# Patient Record
Sex: FEMALE | Race: WHITE | NOT HISPANIC OR LATINO | Employment: OTHER | URBAN - METROPOLITAN AREA
[De-identification: names, ages, dates, MRNs, and addresses within clinical notes are randomized per-mention and may not be internally consistent; named-entity substitution may affect disease eponyms.]

---

## 2017-02-07 ENCOUNTER — TRANSFERRED RECORDS (OUTPATIENT)
Dept: FAMILY MEDICINE | Facility: CLINIC | Age: 82
End: 2017-02-07

## 2017-03-22 DIAGNOSIS — Z76.0 ENCOUNTER FOR MEDICATION REFILL: ICD-10-CM

## 2017-03-22 RX ORDER — LOVASTATIN 20 MG
TABLET ORAL
Qty: 90 TABLET | Refills: 2 | Status: SHIPPED | OUTPATIENT
Start: 2017-03-22 | End: 2017-12-13

## 2017-03-22 NOTE — TELEPHONE ENCOUNTER
Pending Prescriptions:                       Disp   Refills    lovastatin (MEVACOR) 20 MG tablet [Pharmac*90 tab*2        Sig: TAKE ONE (1) TABLET BY MOUTH AT BEDTIME      Last OV 2/1/16  CMP 2/1/16

## 2017-04-10 ENCOUNTER — TELEPHONE (OUTPATIENT)
Dept: FAMILY MEDICINE | Facility: CLINIC | Age: 82
End: 2017-04-10

## 2017-04-10 DIAGNOSIS — Z76.0 ENCOUNTER FOR MEDICATION REFILL: ICD-10-CM

## 2017-04-10 RX ORDER — LISINOPRIL 20 MG/1
TABLET ORAL
Qty: 90 TABLET | Refills: 0 | Status: SHIPPED | OUTPATIENT
Start: 2017-04-10 | End: 2017-07-05

## 2017-04-10 RX ORDER — LEVOTHYROXINE SODIUM 25 UG/1
TABLET ORAL
Qty: 90 TABLET | Refills: 0 | Status: SHIPPED | OUTPATIENT
Start: 2017-04-10 | End: 2017-07-05

## 2017-04-10 NOTE — TELEPHONE ENCOUNTER
I have left a message for Jayna to call us back.  Lyla is due for an appointment.  She was last seen 2/2016.    Donte Richey,   [facility]  [phone]

## 2017-05-19 DIAGNOSIS — Z76.0 ENCOUNTER FOR MEDICATION REFILL: ICD-10-CM

## 2017-05-19 RX ORDER — HYDROCHLOROTHIAZIDE 25 MG/1
TABLET ORAL
Qty: 90 TABLET | Refills: 2 | Status: SHIPPED | OUTPATIENT
Start: 2017-05-19 | End: 2018-03-13

## 2017-07-05 DIAGNOSIS — Z76.0 ENCOUNTER FOR MEDICATION REFILL: ICD-10-CM

## 2017-07-05 NOTE — TELEPHONE ENCOUNTER
multiple rx--- original rx was denied (patient needs appt).  there was an appointment already scheduled for 9/12/17 for a cpx.

## 2017-07-07 RX ORDER — LEVOTHYROXINE SODIUM 25 UG/1
TABLET ORAL
Qty: 90 TABLET | Refills: 0 | Status: SHIPPED | OUTPATIENT
Start: 2017-07-07 | End: 2017-10-09

## 2017-07-07 RX ORDER — LISINOPRIL 20 MG/1
TABLET ORAL
Qty: 90 TABLET | Refills: 0 | Status: SHIPPED | OUTPATIENT
Start: 2017-07-07 | End: 2017-10-09

## 2017-09-12 ENCOUNTER — OFFICE VISIT (OUTPATIENT)
Dept: FAMILY MEDICINE | Facility: CLINIC | Age: 82
End: 2017-09-12

## 2017-09-12 VITALS
HEIGHT: 61 IN | HEART RATE: 67 BPM | BODY MASS INDEX: 25.98 KG/M2 | SYSTOLIC BLOOD PRESSURE: 122 MMHG | OXYGEN SATURATION: 98 % | WEIGHT: 137.6 LBS | TEMPERATURE: 98.5 F | DIASTOLIC BLOOD PRESSURE: 72 MMHG

## 2017-09-12 DIAGNOSIS — Z00.00 MEDICARE ANNUAL WELLNESS VISIT, SUBSEQUENT: Primary | ICD-10-CM

## 2017-09-12 DIAGNOSIS — R41.3 POOR SHORT-TERM MEMORY: ICD-10-CM

## 2017-09-12 DIAGNOSIS — R79.89 ABNORMAL TSH: ICD-10-CM

## 2017-09-12 DIAGNOSIS — E03.8 OTHER SPECIFIED HYPOTHYROIDISM: ICD-10-CM

## 2017-09-12 DIAGNOSIS — I10 ESSENTIAL HYPERTENSION: ICD-10-CM

## 2017-09-12 PROCEDURE — 99214 OFFICE O/P EST MOD 30 MIN: CPT | Mod: 25 | Performed by: FAMILY MEDICINE

## 2017-09-12 PROCEDURE — 36415 COLL VENOUS BLD VENIPUNCTURE: CPT | Performed by: FAMILY MEDICINE

## 2017-09-12 PROCEDURE — G0439 PPPS, SUBSEQ VISIT: HCPCS | Performed by: FAMILY MEDICINE

## 2017-09-12 NOTE — PROGRESS NOTES
"  SUBJECTIVE:   Lyla Duran is a 89 year old female who presents for Preventive Visit.  She is doing well, with medication for HTN, hypothyroidism and hyperlipidemia. She lives in an assisted living environment and has meals. She has ST memory issues, but this seems to be \"stable\".  Are you in the first 12 months of your Medicare Part B coverage?  No    Healthy Habits:    Do you get at least three servings of calcium containing foods daily (dairy, green leafy vegetables, etc.)? yes    Amount of exercise or daily activities, outside of work: 5 day(s) per week    Problems taking medications regularly No    Medication side effects: No    Have you had an eye exam in the past two years? yes    Do you see a dentist twice per year? yes    Do you have sleep apnea, excessive snoring or daytime drowsiness?no    COGNITIVE SCREEN  1) Repeat 3 items (Banana, Sunrise, Chair)    2) Clock draw: NORMAL  3) 3 item recall: Recalls NO objects   Results: 0 items recalled: PROBABLE COGNITIVE IMPAIRMENT, **INFORM PROVIDER**    Mini-CogTM Copyright S Cruzito. Licensed by the author for use in [facility]; reprinted with permission (norbert@[facility]). All rights reserved.              NO CONCERNS    Reviewed and updated as needed this visit by clinical staffTobacco  Allergies  Meds         Reviewed and updated as needed this visit by Provider        Social History   Substance Use Topics     Smoking status: Never Smoker     Smokeless tobacco: Never Used     Alcohol use 0.5 oz/week     1 drink(s) per week       The patient does not drink >3 drinks per day nor >7 drinks per week.    Today's PHQ-2 Score: 0  PHQ-2 ( 1999 Pfizer) 9/12/2017 6/4/2014   Q1: Little interest or pleasure in doing things 0 0   Q2: Feeling down, depressed or hopeless 0 0   PHQ-2 Score 0 0         Do you feel safe in your environment - Yes    Do you have a Health Care Directive?: Yes: Advance Directive has been received and scanned.    Current providers sharing " in care for this patient include: Patient Care Team:  Ranjit Reyes MD as PCP - General (Family Practice)      Hearing impairment: Yes, wear hearing aids     Ability to successfully perform activities of daily living: No, needs assistance with: transportation, shopping, preparing meals, housework, bathing, laundry and medications.    Fall risk:  Fallen 2 or more times in the past year?: No  Any fall with injury in the past year?: No      Home safety:  none identified      The following health maintenance items are reviewed in Epic and correct as of today:  Health Maintenance   Topic Date Due     ADVANCE DIRECTIVE PLANNING Q5 YRS  03/03/1980     TSH Q1 YEAR  06/04/2015     INFLUENZA VACCINE (SYSTEM ASSIGNED)  09/01/2017     FALL RISK ASSESSMENT  09/12/2018     TETANUS IMMUNIZATION (SYSTEM ASSIGNED)  04/03/2019     PNEUMOCOCCAL  Completed     Patient Active Problem List   Diagnosis     Essential hypertension     Mixed hyperlipidemia     Presbyacusis     Disequilibrium     Hypothyroid     Poor short-term memory     FH: CAD (coronary artery disease)     Health Care Home     Past Surgical History:   Procedure Laterality Date     APPENDECTOMY       CATARACT IOL, RT/LT      Cataract IOL RT/LT     EYE SURGERY      retinal hemorrage      THYROIDECTOMY       Thyroidectomy     TONSILLECTOMY & ADENOIDECTOMY       VITRECTOMY PARSPLANA, SECONDARY INTRAOCULAR LENS IMPLANT, COMBINED  9/18/2013    Procedure: COMBINED VITRECTOMY PARSPLANA, SECONDARY INTRAOCULAR LENS IMPLANT;  LEFT 20 GAUGE VITRECTOMY PARSPLANA, EXCHANGE INTRAOCULAR LENS IMPLANT;  Surgeon: Brandon Umanzor MD;  [facility]       Social History   Substance Use Topics     Smoking status: Never Smoker     Smokeless tobacco: Never Used     Alcohol use 0.5 oz/week     1 drink(s) per week     Family History   Problem Relation Age of Onset     CANCER Mother      HEART DISEASE Father      C.A.D. Brother      TORITO.JO. Brother      CANCER Brother          Current  "Outpatient Prescriptions   Medication Sig Dispense Refill     lisinopril (PRINIVIL/ZESTRIL) 20 MG tablet TAKE ONE (1) TABLET BY MOUTH DAILY 90 tablet 0     levothyroxine (SYNTHROID/LEVOTHROID) 25 MCG tablet TAKE ONE (1) TABLET BY MOUTH DAILY 90 tablet 0     hydrochlorothiazide (HYDRODIURIL) 25 MG tablet TAKE ONE (1) TABLET (25 mg) BY mouth every morning 90 tablet 2     lovastatin (MEVACOR) 20 MG tablet TAKE ONE (1) TABLET BY MOUTH AT BEDTIME 90 tablet 2             ROS:  C: NEGATIVE for fever, chills, change in weight  I: NEGATIVE for worrisome rashes, moles or lesions  E: NEGATIVE for vision changes or irritation  E/M: NEGATIVE for ear, mouth and throat problems  R: NEGATIVE for significant cough or SOB  B: NEGATIVE for masses, tenderness or discharge  CV: NEGATIVE for chest pain, palpitations or peripheral edema  GI: NEGATIVE for nausea, abdominal pain, heartburn, or change in bowel habits  : NEGATIVE for frequency, dysuria, or hematuria  M: NEGATIVE for significant arthralgias or myalgia  N: short term memory decline- not new  E: NEGATIVE for temperature intolerance, skin/hair changes  H: NEGATIVE for bleeding problems  P: NEGATIVE for changes in mood or affect    OBJECTIVE:   /72  Pulse 67  Temp 98.5  F (36.9  C) (Oral)  Ht 1.537 m (5' 0.5\")  Wt 62.4 kg (137 lb 9.6 oz)  SpO2 98%  BMI 26.43 kg/m2 Estimated body mass index is 26.43 kg/(m^2) as calculated from the following:    Height as of this encounter: 1.537 m (5' 0.5\").    Weight as of this encounter: 62.4 kg (137 lb 9.6 oz).  EXAM:   GENERAL APPEARANCE: healthy, alert and no distress  EYES: Eyes grossly normal to inspection, PERRL and conjunctivae and sclerae normal  HENT: ear canals and TM's normal, nose and mouth without ulcers or lesions, oropharynx clear and oral mucous membranes moist  NECK: no adenopathy, no asymmetry, masses, or scars and thyroid normal to palpation  RESP: lungs clear to auscultation - no rales, rhonchi or " "wheezes  BREAST: normal without masses, tenderness or nipple discharge and no palpable axillary masses or adenopathy  CV: regular rate and rhythm, normal S1 S2, no S3 or S4, occasional ectopic  ABDOMEN: soft, nontender, no hepatosplenomegaly, no masses and bowel sounds normal  MS: no musculoskeletal defects are noted and gait is age appropriate without ataxia  SKIN: no suspicious lesions or rashes  NEURO: Normal strength and tone, sensory exam grossly normal, poor short term memory, but executive functioning is quite intact  PSYCH: mentation appears normal and affect normal/bright    ASSESSMENT / PLAN:       ICD-10-CM    1. Medicare annual wellness visit, subsequent Z00.00    2. Other specified hypothyroidism E03.8 TSH ([facility])     OFFICE/OUTPT VISIT,EST,LEVL IV   3. Essential hypertension I10 Basic Metabolic Panel (8) ([facility])     OFFICE/OUTPT VISIT,EST,LEVL IV   4. Poor short-term memory R41.3 Vitamin B12 ([facility])     OFFICE/OUTPT VISIT,EST,LEVL IV   she is doing well, no change in meds needed.    End of Life Planning:  Patient currently has an advanced directive: Yes.  Practitioner is supportive of decision.    COUNSELING:          Estimated body mass index is 26.43 kg/(m^2) as calculated from the following:    Height as of this encounter: 1.537 m (5' 0.5\").    Weight as of this encounter: 62.4 kg (137 lb 9.6 oz).     reports that she has never smoked. She has never used smokeless tobacco.      Appropriate preventive services were discussed with this patient, including applicable screening as appropriate for cardiovascular disease, diabetes, osteopenia/osteoporosis, and glaucoma.  As appropriate for age/gender, discussed screening for colorectal cancer, prostate cancer, breast cancer, and cervical cancer. Checklist reviewing preventive services available has been given to the patient.    Reviewed patients plan of care and provided an AVS. The Basic Care Plan (routine screening as documented in Health " Maintenance) for Lyla meets the Care Plan requirement. This Care Plan has been established and reviewed with the Patient.    Counseling Resources:  ATP IV Guidelines  Pooled Cohorts Equation Calculator  Breast Cancer Risk Calculator  FRAX Risk Assessment  ICSI Preventive Guidelines  Dietary Guidelines for Americans, 2010  USDA's MyPlate  ASA Prophylaxis  Lung CA Screening    Ranjit Reyes MD  [facility]

## 2017-09-12 NOTE — MR AVS SNAPSHOT
After Visit Summary   9/12/2017    Lyla Duran    MRN: [id number]           Patient Information     Date Of Birth          3/3/1925        Visit Information        Provider Department      9/12/2017 9:45 AM Ranjit Reyes MD [facility]        Today's Diagnoses     Medicare annual wellness visit, subsequent    -  1    Other specified hypothyroidism        Essential hypertension        Poor short-term memory          Care Instructions      Preventive Health Recommendations    Female Ages 65 +    Yearly exam:     See your health care provider every year in order to  o Review health changes.   o Discuss preventive care.    o Review your medicines if your doctor has prescribed any.      You no longer need a yearly Pap test unless you've had an abnormal Pap test in the past 10 years. If you have vaginal symptoms, such as bleeding or discharge, be sure to talk with your provider about a Pap test.      Every 1 to 2 years, have a mammogram.  If you are over 69, talk with your health care provider about whether or not you want to continue having screening mammograms.      Every 10 years, have a colonoscopy. Or, have a yearly FIT test (stool test). These exams will check for colon cancer.       Have a cholesterol test every 5 years, or more often if your doctor advises it.       Have a diabetes test (fasting glucose) every three years. If you are at risk for diabetes, you should have this test more often.       At age 65, have a bone density scan (DEXA) to check for osteoporosis (brittle bone disease).    Shots:    Get a flu shot each year.    Get a tetanus shot every 10 years.    Talk to your doctor about your pneumonia vaccines. There are now two you should receive - Pneumovax (PPSV 23) and Prevnar (PCV 13).    Talk to your doctor about the shingles vaccine.    Talk to your doctor about the hepatitis B vaccine.    Nutrition:     Eat at least 5 servings of fruits and vegetables each  day.      Eat whole-grain bread, whole-wheat pasta and brown rice instead of white grains and rice.      Talk to your provider about Calcium and Vitamin D.     Lifestyle    Exercise at least 150 minutes a week (30 minutes a day, 5 days a week). This will help you control your weight and prevent disease.      Limit alcohol to one drink per day.      No smoking.       Wear sunscreen to prevent skin cancer.       See your dentist twice a year for an exam and cleaning.      See your eye doctor every 1 to 2 years to screen for conditions such as glaucoma, macular degeneration and cataracts.          Follow-ups after your visit        Who to contact     If you have questions or need follow up information about today's clinic visit or your schedule please contact [facility] directly at [phone].  Normal or non-critical lab and imaging results will be communicated to you by HourVillehart, letter or phone within 4 business days after the clinic has received the results. If you do not hear from us within 7 days, please contact the clinic through Vetiaryt or phone. If you have a critical or abnormal lab result, we will notify you by phone as soon as possible.  Submit refill requests through EUROBOX or call your pharmacy and they will forward the refill request to us. Please allow 3 business days for your refill to be completed.          Additional Information About Your Visit        EUROBOX Information     EUROBOX gives you secure access to your electronic health record. If you see a primary care provider, you can also send messages to your care team and make appointments. If you have questions, please call your primary care clinic.  If you do not have a primary care provider, please call [phone] and they will assist you.        Care EveryWhere ID     This is your Care EveryWhere ID. This could be used by other organizations to access your [place] medical records  [id number]        Your Vitals Were     Pulse  "Temperature Height Pulse Oximetry BMI (Body Mass Index)       67 98.5  F (36.9  C) (Oral) 1.537 m (5' 0.5\") 98% 26.43 kg/m2        Blood Pressure from Last 3 Encounters:   09/12/17 122/72   02/01/16 192/85   02/01/16 134/52    Weight from Last 3 Encounters:   09/12/17 62.4 kg (137 lb 9.6 oz)   02/01/16 58.1 kg (128 lb)   10/27/15 58.2 kg (128 lb 6.4 oz)              We Performed the Following     Basic Metabolic Panel (8) ([facility])     OFFICE/OUTPT VISIT,EST,LEVL IV     TSH ([facility])     Vitamin B12 ([facility])        Primary Care Provider Office Phone # Fax #    Ranjit Reyes [phone] [address] [facility] [address]        Equal Access to Services     [facility] : Hadii taz ku hadasho Soomaali, waaxda luqadaha, qaybta kaalmada adeegyada, waxay hillaryin haykailey mustafa . So [facility] [phone].    ATENCIÓN: Si virginia major, tiene a eckert disposición servicios gratuitos de asistencia lingüística. Llame al [phone].    We comply with applicable federal civil rights laws and [place] laws. We do not discriminate on the basis of race, color, national origin, age, disability sex, sexual orientation or gender identity.            Thank you!     Thank you for choosing [facility]  for your care. Our goal is always to provide you with excellent care. Hearing back from our patients is one way we can continue to improve our services. Please take a few minutes to complete the written survey that you may receive in the mail after your visit with us. Thank you!             Your Updated Medication List - Protect others around you: Learn how to safely use, store and throw away your medicines at www.disposemymeds.org.          This list is accurate as of: 9/12/17  1:01 PM.  Always use your most recent med list.                   Brand Name Dispense Instructions for use Diagnosis    hydrochlorothiazide 25 MG tablet    HYDRODIURIL    90 tablet    TAKE ONE (1) TABLET (25 mg) " BY mouth every morning    Encounter for medication refill       levothyroxine 25 MCG tablet    SYNTHROID/LEVOTHROID    90 tablet    TAKE ONE (1) TABLET BY MOUTH DAILY    Encounter for medication refill       lisinopril 20 MG tablet    PRINIVIL/ZESTRIL    90 tablet    TAKE ONE (1) TABLET BY MOUTH DAILY    Encounter for medication refill       lovastatin 20 MG tablet    MEVACOR    90 tablet    TAKE ONE (1) TABLET BY MOUTH AT BEDTIME    Encounter for medication refill

## 2017-09-12 NOTE — LETTER
[facility] [address] [place]  [address]  Phone: [phone]    September 22, 2017      Lyla Duran  [address]AH[address]  [address] [place] [address]              Dear Lyla,    The results from your recent visit showed that your TSH is low, but free T4 is fine. Ok to continue levothyroxine 25 mcg daily.        Sincerely,     Vikki Crowder M.D.    Results for orders placed or performed in visit on 09/12/17   Basic Metabolic Panel (8) ([facility])   Result Value Ref Range    Glucose 91 65 - 99 mg/dL    Urea Nitrogen 16 10 - 36 mg/dL    Creatinine 0.73 0.57 - 1.00 mg/dL    eGFR If NonAfricn Am 72 >59 mL/min/1.73    eGFR If Africn Am 83 >59 mL/min/1.73    BUN/Creatinine Ratio 22 12 - 28    Sodium 144 134 - 144 mmol/L    Potassium 4.5 3.5 - 5.2 mmol/L    Chloride 101 96 - 106 mmol/L    Total CO2 29 (H) 18 - 28 mmol/L    Calcium 9.2 8.7 - 10.3 mg/dL    Narrative    Performed at:  01 - [facility] [address], [place], [place]  [id number]  : Randy Zapata MD, Phone:  [phone]   TSH ([facility])   Result Value Ref Range    TSH 0.202 (L) 0.450 - 4.500 uIU/mL    Narrative    Performed at:  01 - [facility] [address], [place], [place]  [id number]  : Randy Zapata MD, Phone:  [phone]   Vitamin B12 ([facility])   Result Value Ref Range    Vitamin B12 384 211 - 946 pg/mL    Narrative    Performed at:  01 - [facility] [address], [place], [place]  [id number]  : Randy Zapata MD, Phone:  [phone]   Thyroxine (T4) Free  Direct  S ([facility])   Result Value Ref Range    T4 Free 1.60 0.82 - 1.77 ng/dL    Narrative    Performed at:  01 - [facility] [address], [place], [place]  [id number]  : Randy Zapata MD, Phone:  [phone]

## 2017-09-13 LAB
BUN SERPL-MCNC: 16 MG/DL (ref 10–36)
BUN/CREATININE RATIO: 22 (ref 12–28)
CALCIUM SERPL-MCNC: 9.2 MG/DL (ref 8.7–10.3)
CHLORIDE SERPLBLD-SCNC: 101 MMOL/L (ref 96–106)
CREAT SERPL-MCNC: 0.73 MG/DL (ref 0.57–1)
EGFR IF AFRICN AM: 83 ML/MIN/1.73
EGFR IF NONAFRICN AM: 72 ML/MIN/1.73
GLUCOSE SERPL-MCNC: 91 MG/DL (ref 65–99)
POTASSIUM SERPL-SCNC: 4.5 MMOL/L (ref 3.5–5.2)
SODIUM SERPL-SCNC: 144 MMOL/L (ref 134–144)
TOTAL CO2: 29 MMOL/L (ref 18–28)
TSH BLD-ACNC: 0.2 UIU/ML (ref 0.45–4.5)
VIT B12 SERPL-MCNC: 384 PG/ML (ref 211–946)

## 2017-09-19 LAB — T4 FREE SERPL-MCNC: 1.6 NG/DL (ref 0.82–1.77)

## 2017-10-09 DIAGNOSIS — Z76.0 ENCOUNTER FOR MEDICATION REFILL: ICD-10-CM

## 2017-10-09 NOTE — TELEPHONE ENCOUNTER
levothyroxine, lisinopril last OV - 9/12/17 cpx last tsh & then T4  Kelly Mcclelland MA October 9, 2017 5:00 PM      BP Readings from Last 3 Encounters:   09/12/17 122/72   02/01/16 192/85   02/01/16 134/52     9/12/17 tsh =0.202  T4 Free   Date Value Ref Range Status   09/18/2017 1.60 0.82 - 1.77 ng/dL Final   ]

## 2017-10-10 RX ORDER — LISINOPRIL 20 MG/1
TABLET ORAL
Qty: 90 TABLET | Refills: 3 | Status: SHIPPED | OUTPATIENT
Start: 2017-10-10 | End: 2018-09-14

## 2017-10-10 RX ORDER — LEVOTHYROXINE SODIUM 25 UG/1
TABLET ORAL
Qty: 90 TABLET | Refills: 3 | Status: SHIPPED | OUTPATIENT
Start: 2017-10-10 | End: 2018-09-14

## 2017-10-10 NOTE — TELEPHONE ENCOUNTER
Last Basic Metabolic Panel:  Lab Results   Component Value Date     09/12/2017      Lab Results   Component Value Date    POTASSIUM 4.5 09/12/2017     Lab Results   Component Value Date    CHLORIDE 101 09/12/2017     Lab Results   Component Value Date    TATYANA 9.2 09/12/2017     Lab Results   Component Value Date    CO2 27 02/01/2016     Lab Results   Component Value Date    BUN 16 09/12/2017    BUN 22 09/12/2017     Lab Results   Component Value Date    CR 0.73 09/12/2017     Lab Results   Component Value Date    GLC 91 09/12/2017

## 2017-12-13 DIAGNOSIS — Z76.0 ENCOUNTER FOR MEDICATION REFILL: ICD-10-CM

## 2017-12-13 DIAGNOSIS — E78.2 MIXED HYPERLIPIDEMIA: Primary | ICD-10-CM

## 2017-12-13 RX ORDER — LOVASTATIN 20 MG
TABLET ORAL
Qty: 90 TABLET | Refills: 0 | Status: SHIPPED | OUTPATIENT
Start: 2017-12-13 | End: 2018-03-20

## 2017-12-13 NOTE — TELEPHONE ENCOUNTER
Last OV (Cpx) 9/12/17  Last Labs 9/12/17    Last Basic Metabolic Panel:  Lab Results   Component Value Date     09/12/2017      Lab Results   Component Value Date    POTASSIUM 4.5 09/12/2017     Lab Results   Component Value Date    CHLORIDE 101 09/12/2017     Lab Results   Component Value Date    TATYANA 9.2 09/12/2017     Lab Results   Component Value Date    CO2 27 02/01/2016     Lab Results   Component Value Date    BUN 16 09/12/2017    BUN 22 09/12/2017     Lab Results   Component Value Date    CR 0.73 09/12/2017     Lab Results   Component Value Date    GLC 91 09/12/2017

## 2018-03-13 DIAGNOSIS — Z76.0 ENCOUNTER FOR MEDICATION REFILL: ICD-10-CM

## 2018-03-14 RX ORDER — HYDROCHLOROTHIAZIDE 25 MG/1
TABLET ORAL
Qty: 90 TABLET | Refills: 1 | Status: SHIPPED | OUTPATIENT
Start: 2018-03-14 | End: 2018-09-14

## 2018-03-20 DIAGNOSIS — E78.2 MIXED HYPERLIPIDEMIA: ICD-10-CM

## 2018-03-20 DIAGNOSIS — Z76.0 ENCOUNTER FOR MEDICATION REFILL: ICD-10-CM

## 2018-03-20 RX ORDER — LOVASTATIN 20 MG
TABLET ORAL
Qty: 90 TABLET | Refills: 3 | Status: SHIPPED | OUTPATIENT
Start: 2018-03-20 | End: 2018-09-14

## 2018-03-20 NOTE — TELEPHONE ENCOUNTER
lovastatin (MEVACOR) 20 MG   LAST  Med check 9/12/18 cpx   last labs(for RX) 9/18/17  Next  appt scheduled =  Has 9/14/18 cpx appt  Kelly Mcclelland MA    Recent Labs   Lab Test  05/31/13   1117  05/29/12   1134   CHOL  204*  188   HDL  87*  90*   LDL  105*  85   TRIG  58  66

## 2018-05-17 ENCOUNTER — APPOINTMENT (OUTPATIENT)
Dept: CT IMAGING | Facility: CLINIC | Age: 83
DRG: 177 | End: 2018-05-17
Attending: EMERGENCY MEDICINE
Payer: MEDICARE

## 2018-05-17 ENCOUNTER — HOSPITAL ENCOUNTER (INPATIENT)
Facility: CLINIC | Age: 83
LOS: 6 days | Discharge: HOME HEALTH | DRG: 177 | End: 2018-05-23
Attending: EMERGENCY MEDICINE | Admitting: INTERNAL MEDICINE
Payer: MEDICARE

## 2018-05-17 ENCOUNTER — APPOINTMENT (OUTPATIENT)
Dept: GENERAL RADIOLOGY | Facility: CLINIC | Age: 83
DRG: 177 | End: 2018-05-17
Attending: EMERGENCY MEDICINE
Payer: MEDICARE

## 2018-05-17 DIAGNOSIS — J18.9 PNEUMONIA OF RIGHT LOWER LOBE DUE TO INFECTIOUS ORGANISM: ICD-10-CM

## 2018-05-17 DIAGNOSIS — R53.81 PHYSICAL DECONDITIONING: Primary | ICD-10-CM

## 2018-05-17 DIAGNOSIS — H10.31 ACUTE BACTERIAL CONJUNCTIVITIS OF RIGHT EYE: ICD-10-CM

## 2018-05-17 DIAGNOSIS — A41.9 BACTERIAL SEPSIS (H): ICD-10-CM

## 2018-05-17 DIAGNOSIS — R13.12 OROPHARYNGEAL DYSPHAGIA: ICD-10-CM

## 2018-05-17 LAB
ANION GAP SERPL CALCULATED.3IONS-SCNC: 9 MMOL/L (ref 3–14)
BASOPHILS # BLD AUTO: 0 10E9/L (ref 0–0.2)
BASOPHILS NFR BLD AUTO: 0.1 %
BUN SERPL-MCNC: 38 MG/DL (ref 7–30)
CALCIUM SERPL-MCNC: 8.9 MG/DL (ref 8.5–10.1)
CHLORIDE SERPL-SCNC: 100 MMOL/L (ref 94–109)
CO2 SERPL-SCNC: 27 MMOL/L (ref 20–32)
CREAT SERPL-MCNC: 1.12 MG/DL (ref 0.52–1.04)
DIFFERENTIAL METHOD BLD: ABNORMAL
EOSINOPHIL # BLD AUTO: 0 10E9/L (ref 0–0.7)
EOSINOPHIL NFR BLD AUTO: 0 %
ERYTHROCYTE [DISTWIDTH] IN BLOOD BY AUTOMATED COUNT: 12.5 % (ref 10–15)
GFR SERPL CREATININE-BSD FRML MDRD: 45 ML/MIN/1.7M2
GLUCOSE SERPL-MCNC: 173 MG/DL (ref 70–99)
HCT VFR BLD AUTO: 32.9 % (ref 35–47)
HGB BLD-MCNC: 11.1 G/DL (ref 11.7–15.7)
IMM GRANULOCYTES # BLD: 0 10E9/L (ref 0–0.4)
IMM GRANULOCYTES NFR BLD: 0.2 %
LACTATE BLD-SCNC: 2.6 MMOL/L (ref 0.7–2)
LYMPHOCYTES # BLD AUTO: 0.4 10E9/L (ref 0.8–5.3)
LYMPHOCYTES NFR BLD AUTO: 2.9 %
MCH RBC QN AUTO: 30.8 PG (ref 26.5–33)
MCHC RBC AUTO-ENTMCNC: 33.7 G/DL (ref 31.5–36.5)
MCV RBC AUTO: 91 FL (ref 78–100)
MONOCYTES # BLD AUTO: 1.3 10E9/L (ref 0–1.3)
MONOCYTES NFR BLD AUTO: 9.8 %
NEUTROPHILS # BLD AUTO: 11.1 10E9/L (ref 1.6–8.3)
NEUTROPHILS NFR BLD AUTO: 87 %
NRBC # BLD AUTO: 0 10*3/UL
NRBC BLD AUTO-RTO: 0 /100
PLATELET # BLD AUTO: 193 10E9/L (ref 150–450)
POTASSIUM SERPL-SCNC: 3.5 MMOL/L (ref 3.4–5.3)
RBC # BLD AUTO: 3.6 10E12/L (ref 3.8–5.2)
SODIUM SERPL-SCNC: 136 MMOL/L (ref 133–144)
WBC # BLD AUTO: 12.7 10E9/L (ref 4–11)

## 2018-05-17 PROCEDURE — 25000128 H RX IP 250 OP 636: Performed by: INTERNAL MEDICINE

## 2018-05-17 PROCEDURE — 70450 CT HEAD/BRAIN W/O DYE: CPT

## 2018-05-17 PROCEDURE — 96374 THER/PROPH/DIAG INJ IV PUSH: CPT

## 2018-05-17 PROCEDURE — 36415 COLL VENOUS BLD VENIPUNCTURE: CPT

## 2018-05-17 PROCEDURE — 25800030 ZZH RX IP 258 OP 636: Performed by: PHYSICIAN ASSISTANT

## 2018-05-17 PROCEDURE — 80048 BASIC METABOLIC PNL TOTAL CA: CPT | Performed by: EMERGENCY MEDICINE

## 2018-05-17 PROCEDURE — 83605 ASSAY OF LACTIC ACID: CPT | Performed by: EMERGENCY MEDICINE

## 2018-05-17 PROCEDURE — 71046 X-RAY EXAM CHEST 2 VIEWS: CPT

## 2018-05-17 PROCEDURE — 25000128 H RX IP 250 OP 636: Performed by: EMERGENCY MEDICINE

## 2018-05-17 PROCEDURE — 12000000 ZZH R&B MED SURG/OB

## 2018-05-17 PROCEDURE — 99285 EMERGENCY DEPT VISIT HI MDM: CPT | Mod: 25

## 2018-05-17 PROCEDURE — 87040 BLOOD CULTURE FOR BACTERIA: CPT | Performed by: EMERGENCY MEDICINE

## 2018-05-17 PROCEDURE — 25800030 ZZH RX IP 258 OP 636: Performed by: EMERGENCY MEDICINE

## 2018-05-17 PROCEDURE — 25800030 ZZH RX IP 258 OP 636: Performed by: INTERNAL MEDICINE

## 2018-05-17 PROCEDURE — 99223 1ST HOSP IP/OBS HIGH 75: CPT | Mod: AI | Performed by: INTERNAL MEDICINE

## 2018-05-17 PROCEDURE — 85025 COMPLETE CBC W/AUTO DIFF WBC: CPT | Performed by: EMERGENCY MEDICINE

## 2018-05-17 RX ORDER — SIMVASTATIN 10 MG
10 TABLET ORAL AT BEDTIME
Status: DISCONTINUED | OUTPATIENT
Start: 2018-05-18 | End: 2018-05-23 | Stop reason: HOSPADM

## 2018-05-17 RX ORDER — POTASSIUM CHLORIDE 29.8 MG/ML
20 INJECTION INTRAVENOUS
Status: DISCONTINUED | OUTPATIENT
Start: 2018-05-17 | End: 2018-05-23 | Stop reason: HOSPADM

## 2018-05-17 RX ORDER — ACETAMINOPHEN 325 MG/1
650 TABLET ORAL EVERY 4 HOURS PRN
Status: DISCONTINUED | OUTPATIENT
Start: 2018-05-17 | End: 2018-05-23 | Stop reason: HOSPADM

## 2018-05-17 RX ORDER — POTASSIUM CHLORIDE 7.45 MG/ML
10 INJECTION INTRAVENOUS
Status: DISCONTINUED | OUTPATIENT
Start: 2018-05-17 | End: 2018-05-23 | Stop reason: HOSPADM

## 2018-05-17 RX ORDER — POTASSIUM CHLORIDE 1.5 G/1.58G
20-40 POWDER, FOR SOLUTION ORAL
Status: DISCONTINUED | OUTPATIENT
Start: 2018-05-17 | End: 2018-05-23 | Stop reason: HOSPADM

## 2018-05-17 RX ORDER — SODIUM CHLORIDE 9 MG/ML
INJECTION, SOLUTION INTRAVENOUS CONTINUOUS
Status: DISCONTINUED | OUTPATIENT
Start: 2018-05-17 | End: 2018-05-18

## 2018-05-17 RX ORDER — LEVOTHYROXINE SODIUM 25 UG/1
25 TABLET ORAL DAILY
Status: DISCONTINUED | OUTPATIENT
Start: 2018-05-18 | End: 2018-05-23 | Stop reason: HOSPADM

## 2018-05-17 RX ORDER — ONDANSETRON 2 MG/ML
4 INJECTION INTRAMUSCULAR; INTRAVENOUS EVERY 6 HOURS PRN
Status: DISCONTINUED | OUTPATIENT
Start: 2018-05-17 | End: 2018-05-23 | Stop reason: HOSPADM

## 2018-05-17 RX ORDER — LOVASTATIN 20 MG
20 TABLET ORAL AT BEDTIME
Status: DISCONTINUED | OUTPATIENT
Start: 2018-05-17 | End: 2018-05-17 | Stop reason: CLARIF

## 2018-05-17 RX ORDER — AMOXICILLIN 250 MG
2 CAPSULE ORAL 2 TIMES DAILY PRN
Status: DISCONTINUED | OUTPATIENT
Start: 2018-05-17 | End: 2018-05-23 | Stop reason: HOSPADM

## 2018-05-17 RX ORDER — AMOXICILLIN 250 MG
1 CAPSULE ORAL 2 TIMES DAILY PRN
Status: DISCONTINUED | OUTPATIENT
Start: 2018-05-17 | End: 2018-05-23 | Stop reason: HOSPADM

## 2018-05-17 RX ORDER — ONDANSETRON 4 MG/1
4 TABLET, ORALLY DISINTEGRATING ORAL EVERY 6 HOURS PRN
Status: DISCONTINUED | OUTPATIENT
Start: 2018-05-17 | End: 2018-05-23 | Stop reason: HOSPADM

## 2018-05-17 RX ORDER — POTASSIUM CL/LIDO/0.9 % NACL 10MEQ/0.1L
10 INTRAVENOUS SOLUTION, PIGGYBACK (ML) INTRAVENOUS
Status: DISCONTINUED | OUTPATIENT
Start: 2018-05-17 | End: 2018-05-23 | Stop reason: HOSPADM

## 2018-05-17 RX ORDER — POTASSIUM CHLORIDE 1500 MG/1
20-40 TABLET, EXTENDED RELEASE ORAL
Status: DISCONTINUED | OUTPATIENT
Start: 2018-05-17 | End: 2018-05-23 | Stop reason: HOSPADM

## 2018-05-17 RX ORDER — NALOXONE HYDROCHLORIDE 0.4 MG/ML
.1-.4 INJECTION, SOLUTION INTRAMUSCULAR; INTRAVENOUS; SUBCUTANEOUS
Status: DISCONTINUED | OUTPATIENT
Start: 2018-05-17 | End: 2018-05-23 | Stop reason: HOSPADM

## 2018-05-17 RX ORDER — CEFTRIAXONE 2 G/1
2 INJECTION, POWDER, FOR SOLUTION INTRAMUSCULAR; INTRAVENOUS ONCE
Status: COMPLETED | OUTPATIENT
Start: 2018-05-17 | End: 2018-05-17

## 2018-05-17 RX ORDER — CEFTRIAXONE 2 G/1
2 INJECTION, POWDER, FOR SOLUTION INTRAMUSCULAR; INTRAVENOUS EVERY 24 HOURS
Status: DISCONTINUED | OUTPATIENT
Start: 2018-05-18 | End: 2018-05-21

## 2018-05-17 RX ADMIN — AZITHROMYCIN MONOHYDRATE 500 MG: 500 INJECTION, POWDER, LYOPHILIZED, FOR SOLUTION INTRAVENOUS at 17:33

## 2018-05-17 RX ADMIN — CEFTRIAXONE 2 G: 2 INJECTION, POWDER, FOR SOLUTION INTRAMUSCULAR; INTRAVENOUS at 16:09

## 2018-05-17 RX ADMIN — SODIUM CHLORIDE: 9 INJECTION, SOLUTION INTRAVENOUS at 20:44

## 2018-05-17 RX ADMIN — SODIUM CHLORIDE 1000 ML: 9 INJECTION, SOLUTION INTRAVENOUS at 16:09

## 2018-05-17 ASSESSMENT — ENCOUNTER SYMPTOMS
EYE PAIN: 1
VOMITING: 0
SORE THROAT: 0
COUGH: 1
FEVER: 0
BRUISES/BLEEDS EASILY: 1
DIARRHEA: 0

## 2018-05-17 NOTE — PHARMACY-ADMISSION MEDICATION HISTORY
Admission medication history interview status for the 5/17/2018  admission is complete. See EPIC admission navigator for prior to admission medications     Medication history source reliability:Good- family, medication list    Actions taken by pharmacist (provider contacted, etc):None     Additional medication history information not noted on PTA med list :None    Medication reconciliation/reorder completed by provider prior to medication history? No    Time spent in this activity: 10 min    Prior to Admission medications    Medication Sig Last Dose Taking? Auth Provider   hydrochlorothiazide (HYDRODIURIL) 25 MG tablet TAKE ONE (1) TABLET (25 mg) BY mouth every morning 5/17/2018 at noon Yes Ranjit Reyes MD   levothyroxine (SYNTHROID/LEVOTHROID) 25 MCG tablet TAKE ONE (1) TABLET BY MOUTH DAILY ...THANK YOU... 5/17/2018 at noon Yes Umu Vidal MD   lisinopril (PRINIVIL/ZESTRIL) 20 MG tablet TAKE ONE (1) TABLET BY MOUTH DAILY ...THANK YOU... 5/17/2018 at noon Yes Umu Vidal MD   lovastatin (MEVACOR) 20 MG tablet TAKE ONE (1) TABLET BY MOUTH AT BEDTIME  Patient taking differently: TAKE ONE (1) TABLET BY MOUTH daily 5/17/2018 at noon Yes Ranjit Reyes MD

## 2018-05-17 NOTE — ED NOTES
"[facility]  ED Nurse Handoff Report    ED Chief complaint: Fall (Pt has had a raspy voice and may have fallen yesterday ? family doesn't know . Wanted to get her checked out,, also stated Right eye is watery )      ED Diagnosis:   Final diagnoses:   Pneumonia of right lower lobe due to infectious organism (H)       Code Status: DNR / DNI    Allergies: No Known Allergies    Activity level - Baseline/Home:  Independent    Activity Level - Current:   Independent     Needed?: No    Isolation: No  Infection: Not Applicable    Bariatric?: No    Vital Signs:   Vitals:    05/17/18 1454   BP: 136/72   Resp: 16   Temp: 98.9  F (37.2  C)   TempSrc: Oral   SpO2: 94%   Weight: 62.1 kg (137 lb)   Height: 1.6 m (5' 3\")       Cardiac Rhythm: ,        Pain level: 0-10 Pain Scale: 0    Is this patient confused?: No   Suicidality: Screened negative    Patient Report: Initial Complaint: Patient came in because she has had a cough and raspy voice for the past few days. Family has also noticed lethargy. Patient's Xray shows pneumonia.   Focused Assessment:   Neuro: WDL   Cards: WDL   Pulm: 94% on Room air, dry cough   GI: WDL   : WDL   Tests Performed: Xray, Labs, cultures  Abnormal Results: Labs, Xray  Treatments provided: Fluids, Antibiotics    Family Comments: Niece at bedside    OBS brochure/video discussed/provided to patient: N/A    ED Medications:   Medications   0.9% sodium chloride BOLUS (not administered)   0.9% sodium chloride BOLUS (1,000 mLs Intravenous New Bag 5/17/18 5485)   cefTRIAXone (ROCEPHIN) 2 g vial to attach to  ml bag for ADULTS or NS 50 ml bag for PEDS (2 g Intravenous New Bag 5/17/18 8102)   azithromycin (ZITHROMAX) 500 mg in sodium chloride 0.9 % 250 mL intermittent infusion (not administered)       Drips infusing?:  Yes    For the majority of the shift this patient was Green.   Interventions performed were None.    Severe Sepsis OR Septic Shock Diagnosis Present:   Yes  "   Per the ED Provider, Time Zero for severe sepsis or septic shock is:  1521    3 Hour Severe Sepsis Bundle Completion:  1. Initial Lactic Acid Result:   Recent Labs   Lab Test  05/17/18   1521   LACT  2.6*     2. Blood Cultures before Antibiotics: Yes  3. Broad Spectrum Antibiotics Administered:     Anti-infectives (Future)    Start     Dose/Rate Route Frequency Ordered Stop    05/17/18 1553  cefTRIAXone (ROCEPHIN) 2 g vial to attach to  ml bag for ADULTS or NS 50 ml bag for PEDS      2 g  over 30 Minutes Intravenous ONCE 05/17/18 1552      05/17/18 1553  azithromycin (ZITHROMAX) 500 mg in sodium chloride 0.9 % 250 mL intermittent infusion      500 mg  over 1 Hours Intravenous ONCE 05/17/18 1552          4. 1000 ml of IV fluids have been given so far      6 Hour Severe Sepsis Bundle Completion:    1. Repeat Lactic Acid Level: Not drawn  2. Patient currently on Vasopressors =  No      ED NURSE PHONE NUMBER: *[phone] Selene HENRY

## 2018-05-17 NOTE — IP AVS SNAPSHOT
MRN:[id number]                      After Visit Summary   5/17/2018    Lyla Duran    MRN: [id number]           Thank you!     Thank you for choosing [facility] for your care. Our goal is always to provide you with excellent care. Hearing back from our patients is one way we can continue to improve our services. Please take a few minutes to complete the written survey that you may receive in the mail after you visit with us. Thank you!        Patient Information     Date Of Birth          3/3/1925        Designated Caregiver       Most Recent Value    Caregiver    Will someone help with your care after discharge? yes    Name of designated caregiver The Maurisio     Phone number of caregiver DEMI    Caregiver address [place]      About your hospital stay     You were admitted on:  May 17, 2018 You last received care in the:  [facility] [address] [facility]    You were discharged on:  May 23, 2018        Reason for your hospital stay       You were admitted with confusion and pneumonia.    Pneumonia, possibly community acquired versus aspiration  Acute hypoxemic respiratory insufficiency, improving on supplemental nasal oxygen.  Reactive leukocytosis from pneumonia.  Mild volume overload from fluid resuscitation - status post iv diuresis   Oropharyngeal dysphagia.    Back rash-erythema / contact dermatitis improved  Acute normocytic anemia: likely dilutional/acute illness  Acute metabolic encephalopathy/delirium, improving  Possible fall prior to admission history of dementia  Deconditioned from acute illness/chronic debility.                  Who to Call     For medical emergencies, please call 911.  For non-urgent questions about your medical care, please call your primary care provider or clinic, [phone]          Attending Provider     Provider Specialty    Rosa Melchor MD Emergency Medicine    [facility]       Primary Care Provider Office Phone # Fal  #    Rajan Butt [phone]       When to contact your care team       Call your primary doctor if you have any of the following:  increased shortness of breath, confusion or fever.                  After Care Instructions     Activity       Your activity upon discharge: activity as tolerated with assitance  24 X 7 Supervision            Diet       Follow this diet upon discharge: Orders Placed This Encounter      Room Service      Combination Diet Dysphagia Diet Level 3: Advanced; Thin Liquids (water, ice chips, juice, milk gelatin, ice cream, etc)            Discharge Instructions       Wean off supplemental nasal oxygen as tolerated, goal oxygen saturation greater than 90%.  Aggressive incentive spirometry/a cappella.  Feeding with strict aspiration precautions, follow-up with speech therapy as outpatient.  Frequent reorientation, minimize interruptions.  Recommend repeat imaging of the chest in 4 to 6 weeks for resolution of symptoms.  Limb elevation/compression stocking  Discuss long-term goals of care on PCP visit.  Home health for two weeks go home PT/OT/speech therapy given deconditioning.            Monitor and record       blood pressure 2 -3 times a week and review on provider visit            Oxygen Adult       [place] [facility] Order 2 liter(s) by nasal cannula continuously with use of portable tank. Expected treatment length is 2 weeks.  Test on conserving device as applicable.      Diagnosis   Pneumonia, possibly community acquired versus aspiration  Acute hypoxemic respiratory insufficiency, improving on supplemental nasal oxygen.  Reactive leukocytosis from pneumonia.  Mild volume overload from fluid resuscitation - status post iv diuresis   Oropharyngeal dysphagia.      Patients who qualify for home O2 coverage under the CMS guidelines require ABG tests or O2 sat readings obtained closest to, but no earlier than 2 days prior to the discharge, as evidence of the need for home  oxygen therapy. Testing must be performed while patient is in the chronic stable state. See notes for O2 sats.    I certify that this patient, Lyla Duran has been under my care and that I, or a nurse practitioner or physician's assistant working with me, had a face-to-face encounter that meets the face-to-face encounter requirements with this patient on 5/23/2018. The patient, Lyla Duran was evaluated or treated in whole, or in part, for the following medical condition, which necessitates the use of the ordered oxygen. Treatment Diagnosis:     Attending Provider: Chel Garcia  Physician signature: See electronic signature associated with these discharge orders  Date of Order: May 23, 2018            Wound care and dressings       Instructions to care for your wound at home: as directed - to prevent pressure injury on buttock.                  Follow-up Appointments     Follow-up and recommended labs and tests        Follow up with primary care provider, Rajan Butt, within 7 days for hospital follow- up.  Dr Butt does not have any availability, so you have been scheduled to see  AUSTEN Estrada CNP, that works with Dr Butt, on Friday 5/25/18 at 11:30am.            Follow-up and recommended labs and tests        Follow up with primary care provider, Rajan Butt, within 7 days for hospital follow- up.  The following labs/tests are recommended: WBC count if symptoms not improving.                  Your next 10 appointments already scheduled     May 25, 2018 11:30 AM CDT   Office Visit with AUSTEN Estrada CNP   [facility]    [address] [place] [address] [phone]           Bring a current list of meds and any records pertaining to this visit. For Physicals, please bring immunization records and any forms needing to be filled out. Please arrive 10 minutes early to complete paperwork.            Sep 14, 2018  9:00 AM  "CDT   PHYSICAL with Rajan Butt MD   [facility] ([facility]    [address] [place] [address] [phone]              Additional Services     Home Care OT Referral for Hospital Discharge       OT to eval and treat    Your provider has ordered home care - occupational therapy. If you have not been contacted within 2 days of your discharge please call the department phone number listed on the top of this document.            Home Care PT Referral for Hospital Discharge       PT to eval and treat    Your provider has ordered home care - physical therapy. If you have not been contacted within 2 days of your discharge please call the department phone number listed on the top of this document.            Home Care SLP Referral for Hospital Discharge       SLP to eval and treat    Your provider has ordered home care - speech therapy. If you have not been contacted within 2 days of your discharge please call the department phone number listed on the top of this document.                  Further instructions from your care team       Pt belongings: Bilateral Hearing aides, watch, glasses, clothing, and shoes. All in room with pt.     Pending Results     No orders found from 5/15/2018 to 5/18/2018.            Statement of Approval     Ordered          05/23/18 1033  I have reviewed and agree with all the recommendations and orders detailed in this document.  EFFECTIVE NOW     Approved and electronically signed by:  Chel Garcia MD             Admission Information     Date & Time Provider Department Dept. Phone    5/17/2018 Ben Barrow MD [place] [address] [facility] [phone]      Your Vitals Were     Blood Pressure Pulse Temperature Respirations Height Weight    148/57 (BP Location: Left arm) 87 97.5  F (36.4  C) (Oral) 20 1.575 m (5' 2\") 63 kg (138 lb 14.2 oz)    Pulse Oximetry BMI (Body Mass Index)                91% 25.4 kg/m2     "      [facility] Information     [facility] gives you secure access to your electronic health record. If you see a primary care provider, you can also send messages to your care team and make appointments. If you have questions, please call your primary care clinic.  If you do not have a primary care provider, please call [phone] and they will assist you.        Care EveryWhere ID     This is your Care EveryWhere ID. This could be used by other organizations to access your [facility] medical records  [id number]        Equal Access to Services     SOFIA COLEMAN : Hadii taz gutiérrez hadasho Soomaali, waaxda luqadaha, qaybta kaalmada adeegyada, waxjulius willoughby. So [facility] [phone].    ATENCIÓN: Si habla español, tiene a eckert disposición servicios gratuitos de asistencia lingüística. [facility] [phone].    We comply with applicable federal civil rights laws and [place] laws. We do not discriminate on the basis of race, color, national origin, age, disability, sex, sexual orientation, or gender identity.               Review of your medicines      START taking        Dose / Directions    amoxicillin-clavulanate 875-125 MG per tablet   Commonly known as:  AUGMENTIN   Used for:  Pneumonia of right lower lobe due to infectious organism (H)        Dose:  1 tablet   Take 1 tablet by mouth 2 times daily for 4 days   Quantity:  8 tablet   Refills:  0         CONTINUE these medicines which may have CHANGED, or have new prescriptions. If we are uncertain of the size of tablets/capsules you have at home, strength may be listed as something that might have changed.        Dose / Directions    lovastatin 20 MG tablet   Commonly known as:  MEVACOR   This may have changed:  See the new instructions.   Used for:  Encounter for medication refill, Mixed hyperlipidemia        TAKE ONE (1) TABLET BY MOUTH AT BEDTIME   Quantity:  90 tablet   Refills:  3         CONTINUE these medicines which have NOT CHANGED        Dose  / Directions    hydrochlorothiazide 25 MG tablet   Commonly known as:  HYDRODIURIL   Used for:  Encounter for medication refill        TAKE ONE (1) TABLET (25 mg) BY mouth every morning   Quantity:  90 tablet   Refills:  1       levothyroxine 25 MCG tablet   Commonly known as:  SYNTHROID/LEVOTHROID   Used for:  Encounter for medication refill        TAKE ONE (1) TABLET BY MOUTH DAILY ...THANK YOU...   Quantity:  90 tablet   Refills:  3       lisinopril 20 MG tablet   Commonly known as:  PRINIVIL/ZESTRIL   Used for:  Encounter for medication refill        TAKE ONE (1) TABLET BY MOUTH DAILY ...THANK YOU...   Quantity:  90 tablet   Refills:  3            Where to get your medicines      These medications were sent to [facility] - [place], [place] - [address]  [address] [place] [address]     Phone:  [phone]     amoxicillin-clavulanate 875-125 MG per tablet                Protect others around you: Learn how to safely use, store and throw away your medicines at www.disposemymeds.org.        ANTIBIOTIC INSTRUCTION     You've Been Prescribed an Antibiotic - Now What?  Your healthcare team thinks that you or your loved one might have an infection. Some infections can be treated with antibiotics, which are powerful, life-saving drugs. Like all medications, antibiotics have side effects and should only be used when necessary. There are some important things you should know about your antibiotic treatment.      Your healthcare team may run tests before you start taking an antibiotic.    Your team may take samples (e.g., from your blood, urine or other areas) to run tests to look for bacteria. These test can be important to determine if you need an antibiotic at all and, if you do, which antibiotic will work best.      Within a few days, your healthcare team might change or even stop your antibiotic.    Your team may start you on an antibiotic while they are working to find out what is making you  sick.    Your team might change your antibiotic because test results show that a different antibiotic would be better to treat your infection.    In some cases, once your team has more information, they learn that you do not need an antibiotic at all. They may find out that you don't have an infection, or that the antibiotic you're taking won't work against your infection. For example, an infection caused by a virus can't be treated with antibiotics. Staying on an antibiotic when you don't need it is more likely to be harmful than helpful.      You may experience side effects from your antibiotic.    Like all medications, antibiotics have side effects. Some of these can be serious.    Let you healthcare team know if you have any known allergies when you are admitted to the hospital.    One significant side effect of nearly all antibiotics is the risk of severe and sometimes deadly diarrhea caused by Clostridium difficile (C. Difficile). This occurs when a person takes antibiotics because some good germs are destroyed. Antibiotic use allows C. diificile to take over, putting patients at high risk for this serious infection.    As a patient or caregiver, it is important to understand your or your loved one's antibiotic treatment. It is especially important for caregivers to speak up when patients can't speak for themselves. Here are some important questions to ask your healthcare team.    What infection is this antibiotic treating and how do you know I have that infection?    What side effects might occur from this antibiotic?    How long will I need to take this antibiotic?    Is it safe to take this antibiotic with other medications or supplements (e.g., vitamins) that I am taking?     Are there any special directions I need to know about taking this antibiotic? For example, should I take it with food?    How will I be monitored to know whether my infection is responding to the antibiotic?    What tests may help to  make sure the right antibiotic is prescribed for me?      Information provided by:  www.cdc.gov/getsmart  U.S. Department of Health and Human Services  Centers for disease Control and Prevention  National Center for Emerging and Zoonotic Infectious Diseases  Division of Healthcare Quality Promotion             Medication List: This is a list of all your medications and when to take them. Check marks below indicate your daily home schedule. Keep this list as a reference.      Medications           Morning Afternoon Evening Bedtime As Needed    amoxicillin-clavulanate 875-125 MG per tablet   Commonly known as:  AUGMENTIN   Take 1 tablet by mouth 2 times daily for 4 days   Last time this was given:  5/23/2018   Next Dose Due:  Start taking today with meal.                                 hydrochlorothiazide 25 MG tablet   Commonly known as:  HYDRODIURIL   TAKE ONE (1) TABLET (25 mg) BY mouth every morning   Next Dose Due:  5/24/2018 9am                                    levothyroxine 25 MCG tablet   Commonly known as:  SYNTHROID/LEVOTHROID   TAKE ONE (1) TABLET BY MOUTH DAILY ...THANK YOU...   Last time this was given:  25 mcg on 5/23/2018  9:17 AM   Next Dose Due:  5/24/2018 9am                                   lisinopril 20 MG tablet   Commonly known as:  PRINIVIL/ZESTRIL   TAKE ONE (1) TABLET BY MOUTH DAILY ...THANK YOU...   Last time this was given:  20 mg on 5/23/2018  9:17 AM   Next Dose Due:  5/24/2018 9am                                    lovastatin 20 MG tablet   Commonly known as:  MEVACOR   TAKE ONE (1) TABLET BY MOUTH AT BEDTIME   Next Dose Due:  5/23/2018 bedtime.

## 2018-05-17 NOTE — IP AVS SNAPSHOT
[facility] [address] [facility]    [address] [place] [address]    Phone:  [phone]                                       After Visit Summary   5/17/2018    Lyla Duran    MRN: [id number]           After Visit Summary Signature Page     I have received my discharge instructions, and my questions have been answered. I have discussed any challenges I see with this plan with the nurse or doctor.    ..........................................................................................................................................  Patient/Patient Representative Signature      ..........................................................................................................................................  Patient Representative Print Name and Relationship to Patient    ..................................................               ................................................  Date                                            Time    ..........................................................................................................................................  Reviewed by Signature/Title    ...................................................              ..............................................  Date                                                            Time

## 2018-05-17 NOTE — H&P
Admitted:     05/17/2018      PRIMARY CARE PROVIDER:  Formerly Ranjit Reyes MD, however, she has not seen a physician since he retired, yet they still wish to follow with [facility].      CHIEF COMPLAINT:  Confusion and falls.      HISTORY OF PRESENT ILLNESS:  Please note, much of this history is obtained from the patient's niece at the bedside.  Lyla Duran, herself, is alert, but very hard of hearing and has short-term memory issues.  Per the patient's daughter, she was last seen on Sunday, 4 days ago, for Mother's Day at her niece's house and she seemed to be at her baseline at that point.  Per her niece, she calls her every morning.  Then, on Tuesday morning, she called at her normal time; however, the patient did not answer the phone and this sparked some concern.  The patient was seen today and her niece noticed a couple of bruises on her legs and wondered if perhaps the patient had had a fall.  The patient typically would not remember if she had fallen and is unable to provide any history of this.  She seemed to be mixing up some words and thus, she was brought into the hospital for further evaluation.  The patient was evaluated in the emergency room by Dr. Melchor.  She was found to have consolidation in the right lung base, concerning for pneumonia.  Her white blood cell count was slightly elevated at 12.7 and she was noted to be in slight renal failure with a creatinine of 1.12.  Admission was requested for this constellation of issues.        Again, at the time of my interview, the patient is alert, oriented to the hospital, is conversant, but at times nonsensical.        REVIEW OF SYSTEMS:  Limited and largely obtained through discussion with the niece.  The patient denies any pain currently, denies current shortness of breath.  She has been noted to have a raspy voice starting last Sunday.  She has also been observed to have a cough since last Sunday as well.  Otherwise, a 10-point review  of systems is negative except as noted above in history of present illness.      PAST MEDICAL HISTORY:   1.  Hypothyroidism.   2.  Hypertension.   3.  No known history of coronary artery disease or stroke.      FAMILY HISTORY:  Reviewed and noncontributory.      SOCIAL HISTORY:  The patient lives independently in a senior care facility [facility].  She appears to have good family support.  Lifetime nonsmoker, very rare alcohol use.       CURRENT MEDICATIONS:     1.  Lovastatin.   2.  Lisinopril.   3.  Synthroid.   4.  Hydrochlorothiazide.      PHYSICAL EXAMINATION:   VITAL SIGNS:  Blood pressure 136/72, temperature is 98.9, heart rate of 79.  She is satting at 94% on 2 liters of oxygen.   GENERAL:  This is a well-nourished, elderly female.  She is oriented x 1, otherwise cooperative and appropriate.   HEENT:  Her periorbital regions are erythematous bilaterally.  There is mild conjunctival irritation with some slightly purulent discharge out of the right eye.  Mucous membranes are moist.  Pupils are equal, round and reactive.   LUNGS:  Left lung is clear, slightly diminished at the right base.   HEART:  Rhythm is regular, S1 and S2 present, 1/6 systolic murmur noted.   ABDOMEN:  Soft, nontender, nondistended.   EXTREMITIES:  Slight nondependent lower extremity bilateral edema.  She does have some ecchymoses on her left shin with some well-healed dry eschars.      IMAGIN.  Head CT impression is no acute abnormality.  Atrophy of the brain, white matter changes consistent with sequelae of small vessel ischemic disease, unchanged.   2.  Chest x-ray impression is consolidation in the right base and right mid lung, suggesting pneumonia.  Left lung is clear.  No pleural effusion.      LABORATORY DATA:  Sodium 136, potassium 3.5, BUN 38, creatinine 1.12.  White blood cell count 12.7, hemoglobin 11.1, platelets are 193.  Lactic acid is 2.6.      ASSESSMENT AND PLAN:  Lyla Duran is a 89-year-old female  presenting with confusion and possible fall, with likely community-acquired pneumonia.   1.  Community-acquired pneumonia.  The patient does have a slightly elevated lactic acid and slightly elevated white blood cell count, but does not otherwise meet criteria for sepsis.  She is only slightly hypoxic.  She will be admitted to a medical bed as an inpatient.  Blood cultures have been obtained.  She has been started on azithromycin and Rocephin, which I agree with and will continue.   2.  Confusion.  As above, a head CT is negative.  She does have baseline short-term memory issues.  There is no evidence for acute stroke.  I suspect her acute confusion is likely an infectious encephalopathy related to her pneumonia.   3.  Fluids, electrolytes, nutrition.  The patient is in slight renal insufficiency with a creatinine of 1.12.  Her baseline appears to be in the 0.9 range.  She has received an IV fluid bolus dose.  I will continue normal saline for now.   4.  Hypertension.  The patient is typically maintained on hydrochlorothiazide and lisinopril.  These will be held, given her renal insufficiency, to be restarted later pending her clinical course.   5.  DVT prophylaxis.  This will be subcutaneous Lovenox.   6.  Disposition.  The patient will be admitted as an inpatient.  Consider physical and occupational therapy evaluations after she has recovered somewhat.      CODE STATUS:  This was discussed with the patient and her niece at the bedside.  She has previously expressed her wishes that there is to be no aggressive intervention in the case of a cardiac arrest.  Code status is DNR/DNI.      Total time spent, greater than 50% of which involved counseling and coordination of care, was 60 minutes.         PEGGY JEREZ MD             D: 2018   T: 2018   MT: LUCIA      Name:     BETZAIDA SHEETS   MRN:      [id number]        Account:      [id number]   :      1925        Admitted:     2018                    Document: [id number]

## 2018-05-17 NOTE — LETTER
Transition Communication Hand-off for Care Transitions to Next Level of Care Provider    Name: Lyla Duran  : 3/3/1925  MRN #: [id number]  Primary Care Provider: Rajan Butt     Primary Clinic: [address]  [facility] [id number]     Reason for Hospitalization:  Bacterial sepsis (H) [A41.9]  Acute bacterial conjunctivitis of right eye [H10.31]  Pneumonia of right lower lobe due to infectious organism (H) [J18.1]  Admit Date/Time: 2018  2:55 PM  Discharge Date: /  Payor Source: Payor: [employer] / Plan: [employer] OPEN ACCESS / Product Type: HMO /     Readmission Assessment Measure (ALICE) Risk Score/category: Average  but escalating to elevated as she is still requiring oxygen and cxray shows slow improvement  Reason for Communication Hand-off Referral: Admission diagnoses: PN  Fragility  Avoidable readmission within 30 days    Discharge Plan:Home with 24 hr care  Discharge Needs Assessment:  Needs       Most Recent Value    Equipment Currently Used at Home walker, rolling    # of Referrals Placed by [facility]          Already enrolled in Tele-monitoring program and name of program:  no  Follow-up specialty is recommended: Yes    Follow-up plan:  Future Appointments  Date Time Provider Department Center   2018 3:00 PM Esther Kang, SLP [facility]   2018 11:30 AM Jaja Cartagena APRN CNP [facility]   2018 9:00 AM Rajan Butt MD [facility]       Any outstanding tests or procedures:    Procedures     Future Labs/Procedures    Oxygen Adult     Comments:    New Home Oxygen Order 2 liter(s) by nasal cannula continuously with use of portable tank. Expected treatment length is 2 weeks.  Test on conserving device as applicable.      Diagnosis   Pneumonia, possibly community acquired versus aspiration  Acute hypoxemic respiratory insufficiency, improving on supplemental nasal oxygen.  Reactive leukocytosis from pneumonia.  Mild volume  overload from fluid resuscitation - status post iv diuresis   Oropharyngeal dysphagia.      Patients who qualify for home O2 coverage under the CMS guidelines require ABG tests or O2 sat readings obtained closest to, but no earlier than 2 days prior to the discharge, as evidence of the need for home oxygen therapy. Testing must be performed while patient is in the chronic stable state. See notes for O2 sats.    I certify that this patient, Lyla Duran has been under my care and that I, or a nurse practitioner or physician's assistant working with me, had a face-to-face encounter that meets the face-to-face encounter requirements with this patient on 5/23/2018. The patient, Lyla Duran was evaluated or treated in whole, or in part, for the following medical condition, which necessitates the use of the ordered oxygen. Treatment Diagnosis:     Attending Provider: Chel Garcia  Physician signature: See electronic signature associated with these discharge orders  Date of Order: May 23, 2018          Referrals     Future Labs/Procedures    Home Care OT Referral for Hospital Discharge     Comments:    OT to eval and treat    Your provider has ordered home care - occupational therapy. If you have not been contacted within 2 days of your discharge please call the department phone number listed on the top of this document.    Home Care PT Referral for Hospital Discharge     Comments:    PT to eval and treat    Your provider has ordered home care - physical therapy. If you have not been contacted within 2 days of your discharge please call the department phone number listed on the top of this document.    Home Care SLP Referral for Hospital Discharge     Comments:    SLP to eval and treat    Your provider has ordered home care - speech therapy. If you have not been contacted within 2 days of your discharge please call the department phone number listed on the top of this document.            Key Recommendations:     Patient with h/o HTN, HLD, hearing loss, cognitive deficits admitted with pneumonia.  On discharge  repeat cxray was done and noted to have RLL slight improved but not resolved completely. Noted to have minimal improvement   in  WBC . Patient has been requiring 1 L of nasal oxygen.to keep o2 sats greater than 90 %. Bcx no growth. Patient discharging with home care and 24 hr PCA services.     Fabio Hernández    AVS/Discharge Summary is the source of truth; this is a helpful guide for improved communication of patient story

## 2018-05-17 NOTE — ED PROVIDER NOTES
History     Chief Complaint:  Fall      HPI   Lyla Duran is a 89 year old female with a history of hypertension, hyperlipidemia, and short-term memory loss who presents to the emergency department with her daughter for evaluation of a cough and possible fall. The patient s daughter reports that five days ago the patient s voice seemed hoarse. Then four days ago the patient had lost her voice and couldn t talk. Her daughter states that she had seemed better this week but when she visited her today, she noticed the patient had a bruise on her right hand distally and on her left leg near her ankle, her leg was also swollen. She also reports that usually the patient is dressed and ready for the day but when she arrived she had one sock off and could not recall if she fell or not. The daughter expresses concern for this. The patient usually walks with a walker but the daughter reports she was walking slower than usual today. The home RN caregiver reported today that the patient s lung sounds were good, no wheezing, and she did not have a fever. The daughter also noticed right eye drainage and that the patient seemed confused. Patient is not anticoagulated.    Here, the patient states that the area under her right eye is sore. She patient denies any history of pneumonia. No chest pain or trouble breathing. No vomiting or diarrhea. No fever or sore throat. No recent travel. No change in appetite.    Allergies:  No known drug allergies.    Medications:    HCTZ  Levothyroxine  Lisinopril  Mevacor     Past Medical History:    Dysequilibrium  Hypothyroidism  HLD  Presbyacusis   HTN  CAD    Past Surgical History:    Appendectomy  Cataracts  Retinal hemorrhage  Thyroidectomy  T&A  Vitrectomy parasplana, secondary intraocular lens implant, combined    Family History:    Cancer  CAD    Social History:  Marital Status: Single  Presents to the ED with her daughter.   Tobacco Use: Never  Alcohol Use: 1 drink/weekly  PCP: Ranjit LOREDO  "[facility]     Review of Systems   Constitutional: Negative for fever.   HENT: Negative for sore throat.    Eyes: Positive for pain.   Respiratory: Positive for cough.    Cardiovascular: Negative for chest pain.   Gastrointestinal: Negative for diarrhea and vomiting.   Hematological: Bruises/bleeds easily.       Physical Exam   First Vitals:  BP: 136/72  Heart Rate: 79  Temp: 98.9  F (37.2  C)  Resp: 16  Height: 160 cm (5' 3\")  Weight: 62.1 kg (137 lb)  SpO2: 94 %      Physical Exam  Nursing note and vitals reviewed.  Constitutional:  Appears well-developed and well-nourished. Labored breathing  HENT:   Head:    Atraumatic.   Mouth/Throat:   Oropharynx is clear and moist. No oropharyngeal exudate. Slightly hoarse voice.    Swallowing normally  Eyes:    Pupils are equal, round, and reactive to light. Conjunctival injection right eye and    yellowish drainage. Matted eyes lashes.  Neck:    Normal range of motion. Neck supple.      No tracheal deviation present. No thyromegaly present.   Cardiovascular:  Normal rate, regular rhythm, no murmur   Pulmonary/Chest: Expiratory wheezes bilaterally with decreased air movement.  Abdominal:   Soft. Bowel sounds are normal. Exhibits no distension and      no mass. There is no tenderness.      There is no rebound and no guarding.   Musculoskeletal:  Exhibits no edema. Arms and legs are non-tender.  Lymphadenopathy:  No cervical adenopathy.   Neurological:   Alert and oriented to person and place but not time.   Skin:    Skin is warm and dry. No rash noted. No pallor. Small areas of ecchymosis to    the right distal forearm dorsally and to the anterior aspect of left tibia region.    Emergency Department Course     Imaging:  Radiographic findings were communicated with the patient who voiced understanding of the findings.    Head CT w/o contrast   Final Result   IMPRESSION:    1. No acute abnormality.   2. Atrophy of the brain.  White matter changes consistent with   sequelae of " small vessel ischemic disease. This is unchanged.      LONNY MARCOS MD      Chest XR,  PA & LAT   Final Result   IMPRESSION: Consolidation in the right base and right midlung,   suggesting pneumonia. Left lung is clear. No pleural effusion or   pneumothorax seen on either side.      DEVEN BOLIVAR MD          Laboratory:  CBC:  WBC 12.7 (H), HGB 11.1 (L),   BMP: Creatinine 1.12 (H), GFR  45 (L), glucose 173 (H), BUN 38 (H), otherwise WNL  Lactic acid: 2.6 (H)  Blood cultures: Pending    Interventions:  1609: Normal Saline, 1 liter, IV bolus   1609: Rocephin, 2 g, IV injection    Emergency Department Course:  The patient arrived in triage where vitals were measured and recorded.   The patient was then escorted back to the emergency department.   The patient's medical records were reviewed.  Nursing notes and vitals were reviewed.  1500: I performed an exam of the patient as documented above.  The above workup was undertaken.  1553: I rechecked the patient and discussed results.  Findings and plan explained to the patient and family who consents to admission.   1604: I discussed the patient with Dr. Mathur of the hospitalist service, who will admit the patient to an adult bed for further monitoring, evaluation, and treatment.    Impression & Plan      CMS Diagnoses:   The patient has signs of Severe Sepsisas evidenced by:    1. 2 SIRS criteria, AND  2. Suspected infection, AND   3. Organ dysfunction: Lactic Acid > 1.9    Time severe sepsis diagnosis confirmed = 1535 as this was the time when Lactate resulted, and the level was > 1.9      3 Hour Severe Sepsis Bundle Completion:  1. Initial Lactic Acid Result:   Recent Labs   Lab Test  05/17/18   1521   LACT  2.6*     2. Blood Cultures before Antibiotics: Yes  3. Broad Spectrum Antibiotics Administered: Yes     Anti-infectives (Future)    Start     Dose/Rate Route Frequency Ordered Stop    05/18/18 1800  azithromycin (ZITHROMAX) 250 mg in sodium chloride 0.9 %  250 mL intermittent infusion      250 mg  over 1 Hours Intravenous EVERY 24 HOURS 05/17/18 1710 05/22/18 1759    05/18/18 1600  cefTRIAXone (ROCEPHIN) 2 g vial to attach to  ml bag for ADULTS or NS 50 ml bag for PEDS      2 g  over 30 Minutes Intravenous EVERY 24 HOURS 05/17/18 1710          4. 1000 ml of IV fluids.  Ideal body weight: 50.1 kg (110 lb 7.2 oz)  Adjusted ideal body weight: 54.7 kg (120 lb 8.1 oz)      Medical Decision Making:  I found the patient to have acute pneumonia in the right lower lobe which is causing her to have sepsis syndrome with an elevated lactic acid and white blood cell count. She was treated for community-acquired pneumonia with ceftriaxone and Zithromax IV after blood cultures were performed. She was IV hydrated with normal saline and admitted to the hospital inpatient medical floor in the care of Dr. Mitchell    Diagnosis:    ICD-10-CM    1. Bacterial sepsis (H) A41.9 Blood culture     Blood culture   2. Pneumonia of right lower lobe due to infectious organism (H) J18.1    3. Acute bacterial conjunctivitis of right eye H10.31        Disposition:  Admitted to an adult med bed.         Lorie BOLDEN, am serving as a scribe on 5/17/2018 at 3:00 PM to personally document services performed by Dr. Melchor based on my observations and the provider's statements to me.    EMERGENCY DEPARTMENT       Rosa Melchor MD  05/17/18 [id number]

## 2018-05-17 NOTE — PROGRESS NOTES
RECEIVING UNIT ED HANDOFF REVIEW    ED Nurse Handoff Report was reviewed by: Hi Omer on May 17, 2018 at 4:47 PM     Chart reviewed, ok to send pt.

## 2018-05-18 ENCOUNTER — APPOINTMENT (OUTPATIENT)
Dept: SPEECH THERAPY | Facility: CLINIC | Age: 83
DRG: 177 | End: 2018-05-18
Attending: INTERNAL MEDICINE
Payer: MEDICARE

## 2018-05-18 ENCOUNTER — APPOINTMENT (OUTPATIENT)
Dept: GENERAL RADIOLOGY | Facility: CLINIC | Age: 83
DRG: 177 | End: 2018-05-18
Attending: INTERNAL MEDICINE
Payer: MEDICARE

## 2018-05-18 LAB
ANION GAP SERPL CALCULATED.3IONS-SCNC: 11 MMOL/L (ref 3–14)
BUN SERPL-MCNC: 32 MG/DL (ref 7–30)
CALCIUM SERPL-MCNC: 8.2 MG/DL (ref 8.5–10.1)
CHLORIDE SERPL-SCNC: 106 MMOL/L (ref 94–109)
CO2 SERPL-SCNC: 23 MMOL/L (ref 20–32)
CREAT SERPL-MCNC: 0.73 MG/DL (ref 0.52–1.04)
ERYTHROCYTE [DISTWIDTH] IN BLOOD BY AUTOMATED COUNT: 12.9 % (ref 10–15)
GFR SERPL CREATININE-BSD FRML MDRD: 74 ML/MIN/1.7M2
GLUCOSE SERPL-MCNC: 188 MG/DL (ref 70–99)
HCT VFR BLD AUTO: 30.3 % (ref 35–47)
HGB BLD-MCNC: 10.1 G/DL (ref 11.7–15.7)
LACTATE BLD-SCNC: 1.1 MMOL/L (ref 0.4–1.9)
MCH RBC QN AUTO: 30.7 PG (ref 26.5–33)
MCHC RBC AUTO-ENTMCNC: 33.3 G/DL (ref 31.5–36.5)
MCV RBC AUTO: 92 FL (ref 78–100)
PLATELET # BLD AUTO: 195 10E9/L (ref 150–450)
POTASSIUM SERPL-SCNC: 2.9 MMOL/L (ref 3.4–5.3)
POTASSIUM SERPL-SCNC: 3.9 MMOL/L (ref 3.4–5.3)
RBC # BLD AUTO: 3.29 10E12/L (ref 3.8–5.2)
SODIUM SERPL-SCNC: 140 MMOL/L (ref 133–144)
WBC # BLD AUTO: 13 10E9/L (ref 4–11)

## 2018-05-18 PROCEDURE — G8997 SWALLOW GOAL STATUS: HCPCS | Mod: GN,CI

## 2018-05-18 PROCEDURE — 25000132 ZZH RX MED GY IP 250 OP 250 PS 637: Performed by: PHYSICIAN ASSISTANT

## 2018-05-18 PROCEDURE — 25000128 H RX IP 250 OP 636: Performed by: INTERNAL MEDICINE

## 2018-05-18 PROCEDURE — 25800030 ZZH RX IP 258 OP 636: Performed by: PHYSICIAN ASSISTANT

## 2018-05-18 PROCEDURE — G8998 SWALLOW D/C STATUS: HCPCS | Mod: GN,CI

## 2018-05-18 PROCEDURE — 12000000 ZZH R&B MED SURG/OB

## 2018-05-18 PROCEDURE — 71045 X-RAY EXAM CHEST 1 VIEW: CPT

## 2018-05-18 PROCEDURE — 25800030 ZZH RX IP 258 OP 636: Performed by: INTERNAL MEDICINE

## 2018-05-18 PROCEDURE — 84132 ASSAY OF SERUM POTASSIUM: CPT | Performed by: PHYSICIAN ASSISTANT

## 2018-05-18 PROCEDURE — A9270 NON-COVERED ITEM OR SERVICE: HCPCS | Mod: GY | Performed by: INTERNAL MEDICINE

## 2018-05-18 PROCEDURE — 85027 COMPLETE CBC AUTOMATED: CPT | Performed by: INTERNAL MEDICINE

## 2018-05-18 PROCEDURE — 92526 ORAL FUNCTION THERAPY: CPT | Mod: GN | Performed by: SPEECH-LANGUAGE PATHOLOGIST

## 2018-05-18 PROCEDURE — 40000225 ZZH STATISTIC SLP WARD VISIT: Performed by: SPEECH-LANGUAGE PATHOLOGIST

## 2018-05-18 PROCEDURE — 25000132 ZZH RX MED GY IP 250 OP 250 PS 637: Mod: GY | Performed by: INTERNAL MEDICINE

## 2018-05-18 PROCEDURE — G8996 SWALLOW CURRENT STATUS: HCPCS | Mod: GN,CI

## 2018-05-18 PROCEDURE — 83605 ASSAY OF LACTIC ACID: CPT | Performed by: INTERNAL MEDICINE

## 2018-05-18 PROCEDURE — 99232 SBSQ HOSP IP/OBS MODERATE 35: CPT | Performed by: PHYSICIAN ASSISTANT

## 2018-05-18 PROCEDURE — 80048 BASIC METABOLIC PNL TOTAL CA: CPT | Performed by: INTERNAL MEDICINE

## 2018-05-18 PROCEDURE — A9270 NON-COVERED ITEM OR SERVICE: HCPCS | Mod: GY | Performed by: PHYSICIAN ASSISTANT

## 2018-05-18 PROCEDURE — 25000128 H RX IP 250 OP 636: Performed by: PHYSICIAN ASSISTANT

## 2018-05-18 PROCEDURE — 36415 COLL VENOUS BLD VENIPUNCTURE: CPT | Performed by: INTERNAL MEDICINE

## 2018-05-18 PROCEDURE — 92610 EVALUATE SWALLOWING FUNCTION: CPT | Mod: GN | Performed by: SPEECH-LANGUAGE PATHOLOGIST

## 2018-05-18 PROCEDURE — 36415 COLL VENOUS BLD VENIPUNCTURE: CPT | Performed by: PHYSICIAN ASSISTANT

## 2018-05-18 RX ORDER — LISINOPRIL 20 MG/1
20 TABLET ORAL DAILY
Status: DISCONTINUED | OUTPATIENT
Start: 2018-05-18 | End: 2018-05-23 | Stop reason: HOSPADM

## 2018-05-18 RX ADMIN — AZITHROMYCIN MONOHYDRATE 250 MG: 500 INJECTION, POWDER, LYOPHILIZED, FOR SOLUTION INTRAVENOUS at 18:57

## 2018-05-18 RX ADMIN — ACETAMINOPHEN 650 MG: 325 TABLET ORAL at 16:19

## 2018-05-18 RX ADMIN — LISINOPRIL 20 MG: 20 TABLET ORAL at 14:45

## 2018-05-18 RX ADMIN — POTASSIUM CHLORIDE 40 MEQ: 1500 TABLET, EXTENDED RELEASE ORAL at 11:11

## 2018-05-18 RX ADMIN — LEVOTHYROXINE SODIUM 25 MCG: 25 TABLET ORAL at 08:21

## 2018-05-18 RX ADMIN — ENOXAPARIN SODIUM 40 MG: 40 INJECTION SUBCUTANEOUS at 14:45

## 2018-05-18 RX ADMIN — POTASSIUM CHLORIDE 40 MEQ: 1500 TABLET, EXTENDED RELEASE ORAL at 13:35

## 2018-05-18 RX ADMIN — SODIUM CHLORIDE: 9 INJECTION, SOLUTION INTRAVENOUS at 07:17

## 2018-05-18 RX ADMIN — CEFTRIAXONE 2 G: 2 INJECTION, POWDER, FOR SOLUTION INTRAMUSCULAR; INTRAVENOUS at 16:19

## 2018-05-18 RX ADMIN — SIMVASTATIN 10 MG: 10 TABLET, FILM COATED ORAL at 22:54

## 2018-05-18 NOTE — PLAN OF CARE
"Problem: Patient Care Overview  Goal: Plan of Care/Patient Progress Review  Outcome: No Change  Admission    Patient arrives to room 619-2 via cart from ED.  Care plan note: pt arrived to unit at 1710. Vss, except for low grade fever of 99.6 orally. Alert x4 with intermittent confusions, forgetful. Denied pain. +BS, lungs diminished, on RA, O2 sat >92%, infrequent dry cough noted, sputum sample still needed. Ax1 with GB/WK. Niece, Jayna, whom was also pt's POA, present at bedside. Fair appetite. Continent of B&B, uses bathroom. Attempted to self transfer out of bed several times, wanting to go home. Pt was easily redirectable. VPM ordered and in place working properly. PIV patent and infusing. Purulent drainage noted on both eyes, sclera red, irritated. ED MD aware, no eye drops ordered. Note left in \"Sticky notes to physicians\" session for primary to address in am. Pt denied any discomfort.     2115: niece was updated over the phone.     Inpatient nursing criteria listed below were met:    PCD's Documented: NA  Skin issues/needs documented :NA  Isolation education started/completed NA  Patient allergies verified with patient: Yes  Fall Prevention: Care plan updated, Education given and documented Yes  Care Plan initiated: Yes  Home medications documented in belongings flowsheet: NA  Patient belongings documented in belongings flowsheet: Yes  Reminder note (belongings/ medications) placed in discharge instructions:Yes  Admission profile/ required documentation complete: Yes      "

## 2018-05-18 NOTE — PLAN OF CARE
Problem: Patient Care Overview  Goal: Plan of Care/Patient Progress Review  Outcome: No Change  Pt A/O to self, disoriented to time, place, situation and forgetful. VPM in place, reorientation provided. VS: tachypnea w/ 100.0-100.3 temp, tylenol with decrease in fever to 99. MD notified about elevated RR -- STAT chest xray ordered. MD notified of results, continue to monitor. Sepsis BPA fired -- LA 1.1. Up with 1 and walker. Pt denies pain. Regular + think liquid diet, tolerated. IV abx. K+ replaced recheck 3.9. Anticipated D/C per MD tomorrow. Nursing continue to monitor.

## 2018-05-18 NOTE — PROGRESS NOTES
"CLINICAL SWALLOW EVALUATION       05/18/18 1000   General Information   Onset Date 05/17/18   Start of Care Date 05/18/18   Patient Profile Review/OT: Additional Occupational Profile Info See Profile for full history and prior level of function   Swallowing Evaluation Bedside swallow evaluation   Behaviorial Observations Alert;Confused   Mode of current nutrition Oral diet   Type of oral diet Regular;Thin liquid   Respiratory Status Room air   Comments Patient admitted with right middle and lower lobe pneumonia, encephalopathy.  Her PMH is significant for hypothyroidism and HTN.  She denies difficulty swallowing historically and has no documented hx of dysphagia with SLP intervention.  Her caregiver reports during a social eating event, \"donuts and coffee,\" this week, the patient exhibited a swallow that caused coughing during talking. Her caregiver agrees that her voice is more hoarse than normal.     Clinical Swallow Evaluation   Oral Musculature generally intact   Structural Abnormalities none present   Dentition present and adequate   Mucosal Quality good   Mandibular Strength and Mobility intact   Oral Labial Strength and Mobility WFL   Lingual Strength and Mobility WFL   Velar Elevation intact   Buccal Strength and Mobility intact   Laryngeal Function Cough;Throat clear;Swallow;Voicing initiated;Dry swallow palpated  (Dysphonic)   Oral Musculature Comments Dysphonic   Additional Documentation Yes   Additional evaluation(s) completed today No   Clinical Swallow Eval: Thin Liquid Texture Trial   Mode of Presentation, Thin Liquids cup;self-fed;spoon   Volume of Liquid or Food Presented 8 oz   Oral Phase of Swallow WFL   Pharyngeal Phase of Swallow intact   Diagnostic Statement No overt Sx of aspiration   Clinical Swallow Eval: Puree Solid Texture Trial   Mode of Presentation, Puree spoon;self-fed   Volume of Puree Presented 4 oz   Oral Phase, Puree WFL   Pharyngeal Phase, Puree intact   Diagnostic Statement No " "overt Sx of aspiration   Clinical Swallow Eval: Solid Food Texture Trial   Mode of Presentation, Solid self-fed   Volume of Solid Food Presented 2 crackers   Oral Phase, Solid Residue in oral cavity   Pharyngeal Phase, Solid intact   Diagnostic Statement No overt Sx of aspiration, independent clearance of oral residue   Swallow Eval: Clinical Impressions   Skilled Criteria for Therapy Intervention Skilled criteria met.  Treatment indicated.   Functional Assessment Scale (FAS) 6   Dysphagia Outcome Severity Scale (ANTONINO) Level 6 - ANTONINO   Treatment Diagnosis Minimal oropharyngeal dysphagia   Diet texture recommendations Regular diet;Thin liquids   Recommended Feeding/Eating Techniques small sips/bites   Demonstrates Need for Referral to Another Service social work   Therapy Frequency (1-2x)   Predicted Duration of Therapy Intervention (days/wks) 2 days   Anticipated Discharge Disposition extended care facility   Risks and Benefits of Treatment have been explained. Yes   Patient, family and/or staff in agreement with Plan of Care Yes   Clinical Impression Comments Patient presents with minimal oropharyngeal dysphagia, acute voice changes (likely related to coughing).  Patient's home caregiver reported a social event during \"coffee and donuts\" this week at care center where patient exhibited coughing during eating.  Recommend continue regular diet with thin liquids, intermittent supervision due to confusion, cueing to avoid talking during intake.   Total Evaluation Time   Total Evaluation Time (Minutes) 30     "

## 2018-05-18 NOTE — PLAN OF CARE
"Problem: Patient Care Overview  Goal: Plan of Care/Patient Progress Review  Discharge Planner SLP   Patient plan for discharge: Not stated.   Current status: Clinical swallow evaluation completed and treatment initiated.  Patient presents with minimal oropharyngeal dysphagia, acute voice changes (likely related to coughing).  Patient's home caregiver reported a social event during \"coffee and donuts\" this week at care center where patient exhibited coughing during eating.  Recommend continue regular diet with thin liquids, intermittent supervision due to confusion, cueing to avoid talking during intake.  Barriers to return to prior living situation: No swallowing barriers.   Recommendations for discharge: Return to previous living environment per swallowing needs.   Rationale for recommendations: Recommend 1-2x SLP follow up to ensure tolerance of regular diet with thin liquids, safe swallowing strategy use.        Entered by: Ava Luciano 05/18/2018 10:21 AM         "

## 2018-05-18 NOTE — PROVIDER NOTIFICATION
MD Notification    Notified Person: MD    Notified Person Name: Radha    Notification Date/Time: 5:55 PM 5/18/18    Notification Interaction: Talked to physician    Purpose of Notification: FYI Preliminary result: Slight decrease in right-sided infiltrate since the  comparison study. Left lung remains clear. Lactic acid 1.1    Orders Received: No new orders: continue to monitor    Comments:

## 2018-05-18 NOTE — PLAN OF CARE
Problem: Pneumonia (Adult)  Goal: Signs and Symptoms of Listed Potential Problems Will be Absent, Minimized or Managed (Pneumonia)  Signs and symptoms of listed potential problems will be absent, minimized or managed by discharge/transition of care (reference Pneumonia (Adult) CPG).   Outcome: Improving  Pt A&O to self only. VSS sating 91% on room air (weaned from 2L nasal cannula this morning). Up with assist of 1, gait belt, and walker to chair for meals and bathroom twice this shift. No complaints of pain or SOB. Lung sounds diminished with infrequent/non-productive cough. Tolerating regular diet with fair appetite. Adequate output with one loose stool this shift. On IV Rocephin and Zithromax. Speech consulted and saw pt with no recommendations at this time. K+ 2.9; replaced with recheck scheduled for 1730. Plan to discharge back to independent senior care center 1-2 days when medically stable. VPM in use. Nursing will continue to monitor.

## 2018-05-18 NOTE — DISCHARGE INSTRUCTIONS
Pt belongings: Bilateral Hearing aides, watch, glasses, clothing, and shoes. All in room with pt.

## 2018-05-18 NOTE — PROVIDER NOTIFICATION
MD Notification    Notified Person: MD    Notified Person Name: Radha    Notification Date/Time: 16:37 5/18/18    Notification Interaction: Talked to physician    Purpose of Notification: FYI 34 RR, elevated temp (100.0/100.3), Sepsis BPA fired, LA ordered, Tylenol given for temp.     Orders Received: STAT portable chest xray telephone order with readback. Ordered.     Comments: Notify MD with results.

## 2018-05-18 NOTE — PROGRESS NOTES
[facility]    Hospitalist Progress Note    Assessment & Plan   Lyla Duran is a 89-year-old female who was admitted on 5/17/18 with confusion and possible fall, with likely community-acquired pneumonia.     # Community-acquired pneumonia.  CXR showing consolidation in Rt lung base and right midlung suggesting PNA.   Mild leukocytosis, 12.7 to 13. Tmax 100.1. Breathing 91-92%, weaned from RA this AM. Blood cultures pending, no growth.   - Decrease NS to 75 ml/hr  - Encourage PO  - Ceftriaxone 2g IV daily  - Azithromycin 500mg IV daily  - Recheck CBC in AM  - Titrate O2 PRN  - Change to PO abx on discharge  - PT/OT consults as pt lives in [facility]    # Anemia: H/H 11.1/32.9 down to 10.1/30.3.  - Monitor H/H, recheck CBC in AM  - No current need for blood transfusion, will monitor, transfusion if hgb less than 7    # Confusion, improving.    HCT is negative.  She does have baseline short-term memory issues. There is no evidence for acute stroke.  I suspect her acute confusion is likely an infectious encephalopathy related to her pneumonia.  Family in room during my assessment today who report the patient is more near her baseline and much improved since yesterday.  Seen by SLP who recommends continuing regular texture food with thin liquids without barriers at this time.  - Monitor mental status  - Await blood cultures    # Fluids, electrolytes, nutrition.  Cr normalized to baseline today to 0.73 form 1.12. Potassium down to 2.9.  - Replete K+  - Recheck K+ 4 hrs after repletion, follow protocol and replete as needed    # Hypertension.  SBP 120s-130s. Takes hydrochlorothiazide and lisinopril at home.  They are being held due to renal insufficiency.  - Resume PTA lisinopril   - Hold hydrochlorothiazide for now    # Pain Assessment:  Current Pain Score 5/18/2018   Patient currently in pain? denies   Pain score (0-10) 0   Lyla foy pain level was assessed and she currently denies pain.      DVT Prophylaxis:  Enoxaparin (Lovenox) SQ    Code Status: DNR/DNI    Disposition: Expected discharge in 1-2 days once clinically improved.    This patient was discussed with Dr. Jelena Garcia of the Hospitalist Service who agrees with current plans as outlined above.    Esther Rice PA-C  Hospitalist Physician Assistant  Text Page (7am to 6pm)  Interval History   Feeling well today sitting up in chair joking with myself and family and but to eat lunch.  Patient looks well without complaints today.  Tolerating IV antibiotics.  In room air without increased work of breathing.  Status improved per family and caregiver.  Vital signs stable.  T-max 100.1 overnight last reading 99.2.    -Data reviewed today: I reviewed all new labs and imaging results over the last 24 hours. I personally reviewed no images or EKG's today.    Physical Exam   Temp: 99.2  F (37.3  C) Temp src: Oral BP: 138/65 Pulse: 76 Heart Rate: 83 Resp: 20 SpO2: 91 % O2 Device: None (Room air) Oxygen Delivery: 1 LPM  Vitals:    05/17/18 1454 05/17/18 1710 05/18/18 0653   Weight: 62.1 kg (137 lb) 61.5 kg (135 lb 9.3 oz) 61.8 kg (136 lb 3.9 oz)     Vital Signs with Ranges  Temp:  [98.9  F (37.2  C)-100.1  F (37.8  C)] 99.2  F (37.3  C)  Pulse:  [76] 76  Heart Rate:  [79-83] 83  Resp:  [16-20] 20  BP: (117-138)/(50-72) 138/65  SpO2:  [87 %-95 %] 91 %  I/O last 3 completed shifts:  In: 740 [P.O.:240; I.V.:500]  Out: -     Constitutional: Awake, alert, cooperative, no apparent distress.  Elderly woman who appears younger than stated age.  Respiratory: Clear to auscultation bilaterally, no crackles or wheezing  Cardiovascular: Regular rate and rhythm, normal S1 and S2, and no murmur noted  GI: Normal bowel sounds, soft, non-distended, non-tender  Skin/Integumen: No rashes, no cyanosis, no edema    Medications     - MEDICATION INSTRUCTIONS -       sodium chloride 100 mL/hr at 05/18/18 0717       azithromycin  250 mg Intravenous Q24H     cefTRIAXone  2 g Intravenous Q24H      enoxaparin  40 mg Subcutaneous Q24H     levothyroxine  25 mcg Oral Daily     simvastatin  10 mg Oral At Bedtime       Data     Recent Labs  Lab 05/18/18  0852 05/17/18  1521   WBC 13.0* 12.7*   HGB 10.1* 11.1*   MCV 92 91    193    136   POTASSIUM 2.9* 3.5   CHLORIDE 106 100   CO2 23 27   BUN 32* 38*   CR 0.73 1.12*   ANIONGAP 11 9   TATYANA 8.2* 8.9   * 173*       Imaging:   Recent Results (from the past 24 hour(s))   Head CT w/o contrast    Narrative    CT SCAN OF THE HEAD WITHOUT CONTRAST   5/17/2018 3:38 PM     HISTORY: Check for traumatic bleed, confused and possible recent fall.      TECHNIQUE:  Axial images of the head and coronal reformations without  IV contrast material. Radiation dose for this scan was reduced using  automated exposure control, adjustment of the mA and/or kV according  to patient size, or iterative reconstruction technique.    COMPARISON: 2/1/2016.    FINDINGS:  There is generalized atrophy of the brain.  There is low  attenuation in the white matter of the cerebral hemispheres consistent  with sequelae of small vessel ischemic disease. There is no evidence  of intracranial hemorrhage, mass, acute infarct or anomaly.     The visualized portions of the sinuses and mastoids appear normal.  There is no evidence of trauma.      Impression    IMPRESSION:   1. No acute abnormality.  2. Atrophy of the brain.  White matter changes consistent with  sequelae of small vessel ischemic disease. This is unchanged.    LONNY MARCOS MD   Chest XR,  PA & LAT    Narrative    XR CHEST 2 VW 5/17/2018 3:46 PM    COMPARISON: 2/1/2016    HISTORY: Cough, concern for pneumonia.      Impression    IMPRESSION: Consolidation in the right base and right midlung,  suggesting pneumonia. Left lung is clear. No pleural effusion or  pneumothorax seen on either side.    DEVEN BOLIVAR MD

## 2018-05-18 NOTE — PLAN OF CARE
Problem: Patient Care Overview  Goal: Plan of Care/Patient Progress Review  Outcome: No Change  VSS on 2L, up with 1-2/belt/walker, disoriented to time and place. Pleasantly confused. VPM in room. Voiding appropriately. Denies pain. Pt was 88% on RA at 0200, 94% on 2L. Some DON with amb to bathroom. IVF infusing. DC pending progress. Continue to monitor.

## 2018-05-19 ENCOUNTER — APPOINTMENT (OUTPATIENT)
Dept: SPEECH THERAPY | Facility: CLINIC | Age: 83
DRG: 177 | End: 2018-05-19
Attending: INTERNAL MEDICINE
Payer: MEDICARE

## 2018-05-19 ENCOUNTER — APPOINTMENT (OUTPATIENT)
Dept: OCCUPATIONAL THERAPY | Facility: CLINIC | Age: 83
DRG: 177 | End: 2018-05-19
Attending: PHYSICIAN ASSISTANT
Payer: MEDICARE

## 2018-05-19 ENCOUNTER — APPOINTMENT (OUTPATIENT)
Dept: PHYSICAL THERAPY | Facility: CLINIC | Age: 83
DRG: 177 | End: 2018-05-19
Attending: INTERNAL MEDICINE
Payer: MEDICARE

## 2018-05-19 LAB
ERYTHROCYTE [DISTWIDTH] IN BLOOD BY AUTOMATED COUNT: 12.9 % (ref 10–15)
HCT VFR BLD AUTO: 31.3 % (ref 35–47)
HGB BLD-MCNC: 10.6 G/DL (ref 11.7–15.7)
LACTATE BLD-SCNC: 1.4 MMOL/L (ref 0.4–1.9)
MAGNESIUM SERPL-MCNC: 1.8 MG/DL (ref 1.6–2.3)
MCH RBC QN AUTO: 31 PG (ref 26.5–33)
MCHC RBC AUTO-ENTMCNC: 33.9 G/DL (ref 31.5–36.5)
MCV RBC AUTO: 92 FL (ref 78–100)
PLATELET # BLD AUTO: 197 10E9/L (ref 150–450)
POTASSIUM SERPL-SCNC: 4 MMOL/L (ref 3.4–5.3)
RBC # BLD AUTO: 3.42 10E12/L (ref 3.8–5.2)
WBC # BLD AUTO: 16.3 10E9/L (ref 4–11)

## 2018-05-19 PROCEDURE — 85027 COMPLETE CBC AUTOMATED: CPT | Performed by: PHYSICIAN ASSISTANT

## 2018-05-19 PROCEDURE — A9270 NON-COVERED ITEM OR SERVICE: HCPCS | Mod: GY | Performed by: INTERNAL MEDICINE

## 2018-05-19 PROCEDURE — 25000132 ZZH RX MED GY IP 250 OP 250 PS 637: Performed by: INTERNAL MEDICINE

## 2018-05-19 PROCEDURE — A9270 NON-COVERED ITEM OR SERVICE: HCPCS | Mod: GY | Performed by: PHYSICIAN ASSISTANT

## 2018-05-19 PROCEDURE — 84132 ASSAY OF SERUM POTASSIUM: CPT | Performed by: PHYSICIAN ASSISTANT

## 2018-05-19 PROCEDURE — 83605 ASSAY OF LACTIC ACID: CPT | Performed by: INTERNAL MEDICINE

## 2018-05-19 PROCEDURE — 40000133 ZZH STATISTIC OT WARD VISIT: Performed by: OCCUPATIONAL THERAPIST

## 2018-05-19 PROCEDURE — 97110 THERAPEUTIC EXERCISES: CPT | Mod: GP

## 2018-05-19 PROCEDURE — 40000225 ZZH STATISTIC SLP WARD VISIT: Performed by: SPEECH-LANGUAGE PATHOLOGIST

## 2018-05-19 PROCEDURE — 25000132 ZZH RX MED GY IP 250 OP 250 PS 637: Mod: GY | Performed by: INTERNAL MEDICINE

## 2018-05-19 PROCEDURE — 36415 COLL VENOUS BLD VENIPUNCTURE: CPT | Performed by: PHYSICIAN ASSISTANT

## 2018-05-19 PROCEDURE — 97535 SELF CARE MNGMENT TRAINING: CPT | Mod: GO | Performed by: OCCUPATIONAL THERAPIST

## 2018-05-19 PROCEDURE — 25800030 ZZH RX IP 258 OP 636: Performed by: INTERNAL MEDICINE

## 2018-05-19 PROCEDURE — 25000132 ZZH RX MED GY IP 250 OP 250 PS 637: Performed by: PHYSICIAN ASSISTANT

## 2018-05-19 PROCEDURE — 97165 OT EVAL LOW COMPLEX 30 MIN: CPT | Mod: GO | Performed by: OCCUPATIONAL THERAPIST

## 2018-05-19 PROCEDURE — 97116 GAIT TRAINING THERAPY: CPT | Mod: GP

## 2018-05-19 PROCEDURE — G8979 MOBILITY GOAL STATUS: HCPCS | Mod: GP,CI

## 2018-05-19 PROCEDURE — 40000193 ZZH STATISTIC PT WARD VISIT

## 2018-05-19 PROCEDURE — G8978 MOBILITY CURRENT STATUS: HCPCS | Mod: GP,CI

## 2018-05-19 PROCEDURE — 97161 PT EVAL LOW COMPLEX 20 MIN: CPT | Mod: GP

## 2018-05-19 PROCEDURE — 83735 ASSAY OF MAGNESIUM: CPT | Performed by: PHYSICIAN ASSISTANT

## 2018-05-19 PROCEDURE — 92526 ORAL FUNCTION THERAPY: CPT | Mod: GN | Performed by: SPEECH-LANGUAGE PATHOLOGIST

## 2018-05-19 PROCEDURE — G8989 SELF CARE D/C STATUS: HCPCS | Mod: GO,CJ

## 2018-05-19 PROCEDURE — 99232 SBSQ HOSP IP/OBS MODERATE 35: CPT | Performed by: INTERNAL MEDICINE

## 2018-05-19 PROCEDURE — G8988 SELF CARE GOAL STATUS: HCPCS | Mod: GO,CJ

## 2018-05-19 PROCEDURE — 25000128 H RX IP 250 OP 636: Performed by: PHYSICIAN ASSISTANT

## 2018-05-19 PROCEDURE — 36415 COLL VENOUS BLD VENIPUNCTURE: CPT | Performed by: INTERNAL MEDICINE

## 2018-05-19 PROCEDURE — 12000000 ZZH R&B MED SURG/OB

## 2018-05-19 PROCEDURE — 25000128 H RX IP 250 OP 636: Performed by: INTERNAL MEDICINE

## 2018-05-19 PROCEDURE — G8987 SELF CARE CURRENT STATUS: HCPCS | Mod: GO,CJ

## 2018-05-19 RX ORDER — HYDROCHLOROTHIAZIDE 12.5 MG/1
25 CAPSULE ORAL DAILY
Status: DISCONTINUED | OUTPATIENT
Start: 2018-05-19 | End: 2018-05-22

## 2018-05-19 RX ADMIN — CEFTRIAXONE 2 G: 2 INJECTION, POWDER, FOR SOLUTION INTRAMUSCULAR; INTRAVENOUS at 16:41

## 2018-05-19 RX ADMIN — LEVOTHYROXINE SODIUM 25 MCG: 25 TABLET ORAL at 08:06

## 2018-05-19 RX ADMIN — SIMVASTATIN 10 MG: 10 TABLET, FILM COATED ORAL at 21:04

## 2018-05-19 RX ADMIN — ENOXAPARIN SODIUM 40 MG: 40 INJECTION SUBCUTANEOUS at 15:02

## 2018-05-19 RX ADMIN — HYDROCHLOROTHIAZIDE 25 MG: 12.5 CAPSULE ORAL at 13:06

## 2018-05-19 RX ADMIN — LISINOPRIL 20 MG: 20 TABLET ORAL at 08:06

## 2018-05-19 RX ADMIN — AZITHROMYCIN MONOHYDRATE 250 MG: 500 INJECTION, POWDER, LYOPHILIZED, FOR SOLUTION INTRAVENOUS at 18:56

## 2018-05-19 ASSESSMENT — ACTIVITIES OF DAILY LIVING (ADL): PREVIOUS_RESPONSIBILITIES: HOUSEKEEPING

## 2018-05-19 NOTE — PROGRESS NOTES
[facility]  Hospitalist Progress Note          Assessment and Plan:   Lyla Duran is a 89-year-old female who was admitted on 5/17/18 with confusion and possible fall, with likely community-acquired pneumonia.      Today's plan  Follow white count  D/c iv fluids  Resume hctz  Continue iv antibiotics    # Community-acquired pneumonia.  CXR showing consolidation in Rt lung base and right midlung suggesting PNA.   Mild leukocytosis, 12.7 to 13. Tmax 100.1. Breathing 91-92%, weaned from RA 5/18 and then placed back on 1 liter. Blood cultures pending, no growth.   -d/c IV fluids  - Encourage PO  - Ceftriaxone 2g IV daily  - Azithromycin 500mg IV daily  -white count is increasing, but clinically improving so will not make any changes, but will keep in hospital to ensure there's no changes.  - Titrate O2 PRN  - Change to PO abx on discharge  - PT/OT consults as pt lives in shelter     # Anemia: H/H 11.1/32.9 down to 10.1/30.3.  - Monitor H/H, recheck CBC in AM  - No current need for blood transfusion, will monitor, transfusion if hgb less than 7     # Confusion, improving.    HCT is negative.  She does have baseline short-term memory issues. There is no evidence for acute stroke.  I suspect her acute confusion is likely an infectious encephalopathy related to her pneumonia.  Family in room during my assessment today who report the patient is more near her baseline and much improved since yesterday.  Seen by SLP who recommends continuing regular texture food with thin liquids without barriers at this time.  - Monitor mental status  - Await blood cultures     # Fluids, electrolytes, nutrition.  Cr normalized to baseline today to 0.73 form 1.12. Potassium down to 2.9.       # Hypertension.  SBP 120s-130s. Takes hydrochlorothiazide and lisinopril at home.  They are being held due to renal insufficiency.  - Resume PTA lisinopril   - Resume HCTZ     # Pain Assessment:  Current Pain Score 5/18/2018   Patient currently  "in pain? denies   Pain score (0-10) 0   Lyla foy pain level was assessed and she currently denies pain.       DVT Prophylaxis: Enoxaparin (Lovenox) SQ     Code Status: DNR/DNI     Disposition: Expected discharge in 1-2 days .    Left message for niece, jonathan                 Interval History:   Yesterday had shortness of breath, chest xray was done and this showed some improvement of infiltrate.                  Medications:       azithromycin  250 mg Intravenous Q24H     cefTRIAXone  2 g Intravenous Q24H     enoxaparin  40 mg Subcutaneous Q24H     levothyroxine  25 mcg Oral Daily     lisinopril  20 mg Oral Daily     simvastatin  10 mg Oral At Bedtime     acetaminophen, guaiFENesin, hypromellose-dextran, melatonin, naloxone, ondansetron **OR** ondansetron, - MEDICATION INSTRUCTIONS -, potassium chloride, potassium chloride with lidocaine, potassium chloride, potassium chloride, potassium chloride, senna-docusate **OR** senna-docusate               Physical Exam:   Blood pressure 142/72, pulse 76, temperature 98.8  F (37.1  C), temperature source Oral, resp. rate 20, height 1.575 m (5' 2\"), weight 62.8 kg (138 lb 7.2 oz), SpO2 93 %.  Temp:  [97.8  F (36.6  C)-100.5  F (38.1  C)] 98.8  F (37.1  C)  Heart Rate:  [81-98] 82  Resp:  [20-34] 20  BP: (127-143)/(54-78) 142/72  SpO2:  [88 %-97 %] 93 %    Wt Readings from Last 4 Encounters:   05/19/18 62.8 kg (138 lb 7.2 oz)   09/12/17 62.4 kg (137 lb 9.6 oz)   02/01/16 58.1 kg (128 lb)   10/27/15 58.2 kg (128 lb 6.4 oz)         Intake/Output Summary (Last 24 hours) at 05/19/18 0913  Last data filed at 05/19/18 0124   Gross per 24 hour   Intake             2039 ml   Output              475 ml   Net             1564 ml       Constitutional:   Appears well; in no apparent distress     Lungs:   Decreased air entry at base     Cardiovascular:   S1, S2 heard; no S3 and no murmur     Abdomen:   Soft, nontender, no organomegaly, bowel sounds normal     Neurologic:   Alert and " oriented, moves all extremities                Data:   Lab Results   Component Value Date     05/18/2018     05/17/2018     09/12/2017    Lab Results   Component Value Date    CHLORIDE 106 05/18/2018    CHLORIDE 100 05/17/2018    CHLORIDE 101 09/12/2017    Lab Results   Component Value Date    BUN 32 05/18/2018    BUN 38 05/17/2018    BUN 16 09/12/2017    BUN 22 09/12/2017      Lab Results   Component Value Date    POTASSIUM 3.9 05/18/2018    POTASSIUM 2.9 05/18/2018    POTASSIUM 3.5 05/17/2018    Lab Results   Component Value Date    CO2 23 05/18/2018    CO2 27 05/17/2018    CO2 27 02/01/2016    Lab Results   Component Value Date    CR 0.73 05/18/2018    CR 1.12 05/17/2018    CR 0.73 09/12/2017        Recent Labs   Lab Test  05/19/18   0750  05/18/18   0852  05/17/18   1521   WBC  16.3*  13.0*  12.7*   HGB  10.6*  10.1*  11.1*   HCT  31.3*  30.3*  32.9*   MCV  92  92  91   PLT  197  195  193     Recent Labs   Lab Test  05/18/18   0852  05/17/18   1521  09/12/17   1048   GLC  188*  173*  91

## 2018-05-19 NOTE — PLAN OF CARE
Problem: Patient Care Overview  Goal: Plan of Care/Patient Progress Review  Outcome: No Change  Oriented only to self, reorientation unsuccessful.  Patient is calm, accepts reassurance frequently and well.  VSS on r/a, denies pain, SOB, nausea, h/a.  [place].  Up in chair, assist of 1 with GB&W.  Incontinent.  Good appetite.  Cough is infrequent and nonproductive, will send any sputum for labs awaiting collection when produced.  VPM continued.  WBCs increased to 16.3 today from 13 yesterday.

## 2018-05-19 NOTE — PLAN OF CARE
Problem: Patient Care Overview  Goal: Plan of Care/Patient Progress Review  PT:  Discharge Planner PT   Patient plan for discharge: Return home  Current status: Orders received, eval completed, treatment initiated. Pt admitted with confusion, pneumonia. Prior to admit pt was living alone in an independent apartment, uses a FWW, independent with mobility. Currently requires min A supine to sit, min A sit to stand with FWW, CGA for gait of 175 ft with FWW with slow shuffled gait pattern. Participated in LE strengthening with encouragement. Pt demonstrates dec strength, balance, activity tolerance and difficulty ambulating and transferring and would benefit from skilled PT services in order to improve this.  Barriers to return to prior living situation: Falls risk, needs assist with mobility, increased confusion  Recommendations for discharge: TCU  Rationale for recommendations: Pt would benefit from continued PT to improve strength, balance, mobility to increase independence, reduce falls risk and increase safety before returning home.       Entered by: Lashae Perrin 05/19/2018 2:04 PM

## 2018-05-19 NOTE — PROGRESS NOTES
05/19/18 1330   Quick Adds   Type of Visit Initial PT Evaluation   Living Environment   Lives With alone   Living Arrangements apartment;independent living facility   Home Accessibility no concerns   Number of Stairs to Enter Home 0   Number of Stairs Within Home 0   Self-Care   Usual Activity Tolerance moderate   Current Activity Tolerance fair   Regular Exercise no   Equipment Currently Used at Home walker, rolling   Functional Level Prior   Ambulation 1-->assistive equipment   Transferring 1-->assistive equipment   Fall history within last six months no   Which of the above functional risks had a recent onset or change? none   General Information   Onset of Illness/Injury or Date of Surgery - Date 05/17/18   Referring Physician Jelena Garcia MD   Patient/Family Goals Statement Return home   Pertinent History of Current Problem (include personal factors and/or comorbidities that impact the POC) Admitted with pneumonia, confusion. PMH: short term memory impairment, HTN.   Precautions/Limitations fall precautions   Weight-Bearing Status - LLE full weight-bearing   Weight-Bearing Status - RLE full weight-bearing   Cognitive Status Examination   Orientation person   Level of Consciousness confused   Follows Commands and Answers Questions 100% of the time;able to follow single-step instructions   Personal Safety and Judgment impaired   Memory impaired   Pain Assessment   Patient Currently in Pain No   Posture    Posture Forward head position;Protracted shoulders   Range of Motion (ROM)   ROM Quick Adds No deficits were identified   Strength   Strength Comments B hip flex 4/5, knee ext 4+/5, DF 4/5   Bed Mobility   Bed Mobility Comments Supine to sit with min A   Transfer Skills   Transfer Comments Sit to stand with min A and FWW   Gait   Gait Comments Pt amb 10 ft with FWW and CGA, shuffling gait and slow pace   Balance   Balance Comments Balance dec with gait and transfers   General Therapy Interventions   Planned  "Therapy Interventions bed mobility training;gait training;neuromuscular re-education;strengthening;transfer training   Clinical Impression   Criteria for Skilled Therapeutic Intervention yes, treatment indicated   PT Diagnosis Difficulty ambulating   Influenced by the following impairments Dec strength, balance, activity tolerance   Functional limitations due to impairments Difficulty ambulating and transferring   Clinical Presentation Stable/Uncomplicated   Clinical Presentation Rationale medically stable   Clinical Decision Making (Complexity) Low complexity   Therapy Frequency` 5 times/week   Predicted Duration of Therapy Intervention (days/wks) 1 week   Anticipated Discharge Disposition Transitional Care Facility   Risk & Benefits of therapy have been explained Yes   Patient, Family & other staff in agreement with plan of care Yes   [facility] TM \"6 Clicks\"   2016, Trustees of [facility], under license to [employer].  All rights reserved.   6 Clicks Short Forms Basic Mobility Inpatient Short Form   [facility]  \"6 Clicks\" V.2 Basic Mobility Inpatient Short Form   1. Turning from your back to your side while in a flat bed without using bedrails? 3 - A Little   2. Moving from lying on your back to sitting on the side of a flat bed without using bedrails? 3 - A Little   3. Moving to and from a bed to a chair (including a wheelchair)? 3 - A Little   4. Standing up from a chair using your arms (e.g., wheelchair, or bedside chair)? 3 - A Little   5. To walk in hospital room? 3 - A Little   6. Climbing 3-5 steps with a railing? 2 - A Lot   Basic Mobility Raw Score (Score out of 24.Lower scores equate to lower levels of function) 17   Total Evaluation Time   Total Evaluation Time (Minutes) 15     "

## 2018-05-19 NOTE — PLAN OF CARE
Problem: Patient Care Overview  Goal: Plan of Care/Patient Progress Review  Outcome: No Change  Alert to self only.  Regular diet.  Up with assist of 1 +gb/walker.  VSS on room air except desats to 90 % on room air, improved to 92% on 1L nc.  LS diminished.  Infrequent, non-productive cough.   VPM present in the room.  Frequently set off bed alarm.  Denies pain.  Nursing will continue to monitor.

## 2018-05-19 NOTE — PLAN OF CARE
Problem: Patient Care Overview  Goal: Plan of Care/Patient Progress Review  Outcome: No Change  Disoriented to time, place and situation. Bed alarm on and video monitor at bedside. Pt. frequently setting off bed alarm. VSS. Pt. satting in the 90's on room air. Pt. continues on IV antibiotics. Voiding spontaneously. Pt. had one loose BM. Up with assist of 1 with walker and gait belt. No acute events. Will continue POC and notify MD with changes.

## 2018-05-19 NOTE — PLAN OF CARE
Problem: Patient Care Overview  Goal: Plan of Care/Patient Progress Review  Discharge Planner OT   Patient plan for discharge: None stated  Current status: Orders received, eval completed, treatment initiated, pt admitted for worsening confusion and pneumonia. Pt completed sit to stands 4x with MIN A, VC for hand placement and sequencing, pt wants to sit immediately and refused ambulation due to fear of falling. Pt able to tie shoes, however, pt would require cues for sequencing on dressing and assist up for pants. Family participated in ed regarding recommendations.   Barriers to return to prior living situation: Level of assist, pt lives alone, fall risk, cognition  Recommendations for discharge: TCU; family is concerned that pt confusion is situational, if pt returns home recommend 24 hour assist for all mobility and ADLs as well as home OT/PT  Rationale for recommendations: Pt would benefit from daily therapies to increase safety and IND in ADLs to return to PLOF, pt would benefit from TCU, however, if family prefers home to manage confusion and resume routine pt will require strict 24 hour assist for safety and home OT to increase IND.        Entered by: Priyanka Mancini 05/19/2018 3:16 PM

## 2018-05-19 NOTE — PROGRESS NOTES
05/19/18 1500   Quick Adds   Type of Visit Initial Occupational Therapy Evaluation   Living Environment   Lives With alone   Living Arrangements apartment;independent living facility   Home Accessibility no concerns   Number of Stairs to Enter Home 0   Number of Stairs Within Home 0   Living Environment Comment Pt has assist from home care 3x/week for 3-4 hours for deep cleaning and showers, family provides daily checks with reminders for medications.    Self-Care   Usual Activity Tolerance moderate   Regular Exercise no   Equipment Currently Used at Home walker, rolling   Functional Level Prior   Ambulation 1-->assistive equipment   Transferring 1-->assistive equipment   Toileting 1-->assistive equipment   Bathing 3-->assistive equipment and person   Dressing 0-->independent   Eating 0-->independent   Communication 0-->understands/communicates without difficulty   Swallowing 0-->swallows foods/liquids without difficulty   Cognition 1 - attention or memory deficits   Fall history within last six months no   Prior Functional Level Comment Pt has assist from home care 3x/week for 3-4 hours for deep cleaning and showers, family provides daily checks with reminders for medications. Family reports pt does best when in her own envirionment   General Information   Onset of Illness/Injury or Date of Surgery - Date 05/17/18   Referring Physician Esther Rice PA-C   Patient/Family Goals Statement [facility]   Additional Occupational Profile Info/Pertinent History of Current Problem Pt is a 89 yo female admitted for pnumonia   Precautions/Limitations fall precautions   Cognitive Status Examination   Orientation person   Level of Consciousness alert;confused   Able to Follow Commands mild impairment   Personal Safety (Cognitive) decreased awareness, need for assist   Memory impaired   Executive Function Self awareness/monitoring impaired;Planning ability impaired;Initiation impaired    Cognitive Comment Pt has baseline cognitive deficits, pt is confused at time of eval, family reports pt does best when in her own routine and in her own home   Visual Perception   Visual Perception Wears glasses   Pain Assessment   Patient Currently in Pain No   Range of Motion (ROM)   ROM Comment BUE WFL for ADLs   Strength   Strength Comments BUE WFL for ADLs   Hand Strength   Hand Strength Comments BUE WFL for ADLs   Coordination   Upper Extremity Coordination No deficits were identified   Transfer Skill: Bed to Chair/Chair to Bed   Level of [place]: Bed to Chair other (see comments)   Physical Assist/Nonphysical Assist: Bed to Chair (Pt refused ambulation due to fear of falling)   Transfer Skill: Sit to Stand   Level of [place]: Sit/Stand minimum assist (75% patients effort)   Physical Assist/Nonphysical Assist: Sit/Stand supervision;verbal cues;1 person assist   Assistive Device for Transfer: Sit/Stand rolling walker   Bathing   Level of [place] minimum assist (75% patients effort)   Upper Body Dressing   Level of [place]: Dress Upper Body stand-by assist   Lower Body Dressing   Level of [place]: Dress Lower Body minimum assist (75% patients effort)   Toileting   Level of [place]: Toilet moderate assist (50% patients effort)   Grooming   Level of [place]: Grooming minimum assist (75% patients effort)   Eating/Self Feeding   Level of [place]: Eating stand-by assist   Instrumental Activities of Daily Living (IADL)   Previous Responsibilities housekeeping   IADL Comments pt makes her own bed and does daily cleanup   Activities of Daily Living Analysis   Impairments Contributing to Impaired Activities of Daily Living cognition impaired;strength decreased;fear and anxiety   General Therapy Interventions   Planned Therapy Interventions ADL retraining;cognition;progressive activity/exercise;transfer training   Clinical Impression   Criteria for Skilled Therapeutic  "Interventions Met yes, treatment indicated   OT Diagnosis Reduced IND in ADls   Influenced by the following impairments cognition impaired;strength decreased;fear and anxiety   Assessment of Occupational Performance 3-5 Performance Deficits   Identified Performance Deficits dressing, toileting, bathing, bed mobility, transfers/ambulation   Clinical Decision Making (Complexity) Moderate complexity   Therapy Frequency daily   Predicted Duration of Therapy Intervention (days/wks) 3 days   Anticipated Discharge Disposition Transitional Care Facility   Risks and Benefits of Treatment have been explained. Yes   Patient, Family & other staff in agreement with plan of care Yes   [facility] TM \"6 Clicks\"   2016, Trustees of [facility], under license to [employer].  All rights reserved.   6 Clicks Short Forms Basic Mobility Inpatient Short Form;Daily Activity Inpatient Short Form   [facility]  \"6 Clicks\" Daily Activity Inpatient Short Form   1. Putting on and taking off regular lower body clothing? 3 - A Little   2. Bathing (including washing, rinsing, drying)? 3 - A Little   3. Toileting, which includes using toilet, bedpan or urinal? 3 - A Little   4. Putting on and taking off regular upper body clothing? 3 - A Little   5. Taking care of personal grooming such as brushing teeth? 3 - A Little   6. Eating meals? 4 - None   Daily Activity Raw Score (Score out of 24.Lower scores equate to lower levels of function) 19   Total Evaluation Time   Total Evaluation Time (Minutes) 10     "

## 2018-05-19 NOTE — PLAN OF CARE
Problem: Patient Care Overview  Goal: Plan of Care/Patient Progress Review  Discharge Planner SLP   Patient plan for discharge: Patient did not state.   Current status: SLP: Patient seen for swallow treatment with lunch for diet tolerance and swallow strategies. Patient with prolonged mastication of corn and mild to moderate oral residue. Patient using a liquid to rinse it down. Drinking via a straw which demonstrated overt Sx of aspiration. Tolerating via the cup without immediate Sx of aspiration. Needed max verbal cues to follow swallow strategies. Patient is at an increased risk for aspiration when drinking from a straw and with difficult to chew solids such as corn, meats. Ate only ate small amount of her lunch. Recommend: 1. Cautiously continue with regular textures (encourage soft moist choices) and thin liquids. NO STRAWS, up in a chair for all meals, small bites/sips, slow rate of eating. Hold diet if overt Sx of aspiration present. 2. SLP will f/u for 1 more session.    Barriers to return to prior living situation: Cognition/confusion.  Recommendations for discharge: Per medical team.  Rationale for recommendations: Anticipate goals to be met as an IP.        Entered by: Esther Kang 05/19/2018 1:09 PM

## 2018-05-19 NOTE — PROGRESS NOTES
Patient/family expresses that information can be shared with Erin Pool, patient's niece.  [id number] has been shared with Mary to share as an identifier.

## 2018-05-20 ENCOUNTER — APPOINTMENT (OUTPATIENT)
Dept: OCCUPATIONAL THERAPY | Facility: CLINIC | Age: 83
DRG: 177 | End: 2018-05-20
Payer: MEDICARE

## 2018-05-20 ENCOUNTER — ANESTHESIA EVENT (OUTPATIENT)
Dept: MEDSURG UNIT | Facility: CLINIC | Age: 83
DRG: 177 | End: 2018-05-20
Payer: MEDICARE

## 2018-05-20 ENCOUNTER — APPOINTMENT (OUTPATIENT)
Dept: PHYSICAL THERAPY | Facility: CLINIC | Age: 83
DRG: 177 | End: 2018-05-20
Payer: MEDICARE

## 2018-05-20 ENCOUNTER — APPOINTMENT (OUTPATIENT)
Dept: SPEECH THERAPY | Facility: CLINIC | Age: 83
DRG: 177 | End: 2018-05-20
Payer: MEDICARE

## 2018-05-20 ENCOUNTER — ANESTHESIA (OUTPATIENT)
Dept: MEDSURG UNIT | Facility: CLINIC | Age: 83
DRG: 177 | End: 2018-05-20
Payer: MEDICARE

## 2018-05-20 LAB
ANION GAP SERPL CALCULATED.3IONS-SCNC: 10 MMOL/L (ref 3–14)
BUN SERPL-MCNC: 14 MG/DL (ref 7–30)
CALCIUM SERPL-MCNC: 8.3 MG/DL (ref 8.5–10.1)
CHLORIDE SERPL-SCNC: 102 MMOL/L (ref 94–109)
CO2 SERPL-SCNC: 23 MMOL/L (ref 20–32)
CREAT SERPL-MCNC: 0.6 MG/DL (ref 0.52–1.04)
ERYTHROCYTE [DISTWIDTH] IN BLOOD BY AUTOMATED COUNT: 12.8 % (ref 10–15)
GFR SERPL CREATININE-BSD FRML MDRD: >90 ML/MIN/1.7M2
GLUCOSE SERPL-MCNC: 153 MG/DL (ref 70–99)
HCT VFR BLD AUTO: 32.8 % (ref 35–47)
HGB BLD-MCNC: 11.1 G/DL (ref 11.7–15.7)
MCH RBC QN AUTO: 30.8 PG (ref 26.5–33)
MCHC RBC AUTO-ENTMCNC: 33.8 G/DL (ref 31.5–36.5)
MCV RBC AUTO: 91 FL (ref 78–100)
PLATELET # BLD AUTO: 227 10E9/L (ref 150–450)
POTASSIUM SERPL-SCNC: 3.4 MMOL/L (ref 3.4–5.3)
RBC # BLD AUTO: 3.6 10E12/L (ref 3.8–5.2)
SODIUM SERPL-SCNC: 135 MMOL/L (ref 133–144)
WBC # BLD AUTO: 15.1 10E9/L (ref 4–11)

## 2018-05-20 PROCEDURE — 25800030 ZZH RX IP 258 OP 636: Performed by: INTERNAL MEDICINE

## 2018-05-20 PROCEDURE — 97110 THERAPEUTIC EXERCISES: CPT | Mod: GP

## 2018-05-20 PROCEDURE — 97530 THERAPEUTIC ACTIVITIES: CPT | Mod: GO | Performed by: OCCUPATIONAL THERAPY ASSISTANT

## 2018-05-20 PROCEDURE — 40000133 ZZH STATISTIC OT WARD VISIT: Performed by: OCCUPATIONAL THERAPY ASSISTANT

## 2018-05-20 PROCEDURE — 99232 SBSQ HOSP IP/OBS MODERATE 35: CPT | Performed by: INTERNAL MEDICINE

## 2018-05-20 PROCEDURE — A9270 NON-COVERED ITEM OR SERVICE: HCPCS | Mod: GY | Performed by: INTERNAL MEDICINE

## 2018-05-20 PROCEDURE — 25000132 ZZH RX MED GY IP 250 OP 250 PS 637: Mod: GY | Performed by: INTERNAL MEDICINE

## 2018-05-20 PROCEDURE — 36415 COLL VENOUS BLD VENIPUNCTURE: CPT | Performed by: INTERNAL MEDICINE

## 2018-05-20 PROCEDURE — 25000128 H RX IP 250 OP 636: Performed by: INTERNAL MEDICINE

## 2018-05-20 PROCEDURE — 80048 BASIC METABOLIC PNL TOTAL CA: CPT | Performed by: INTERNAL MEDICINE

## 2018-05-20 PROCEDURE — 97535 SELF CARE MNGMENT TRAINING: CPT | Mod: GO | Performed by: OCCUPATIONAL THERAPY ASSISTANT

## 2018-05-20 PROCEDURE — 12000000 ZZH R&B MED SURG/OB

## 2018-05-20 PROCEDURE — 40000225 ZZH STATISTIC SLP WARD VISIT: Performed by: SPEECH-LANGUAGE PATHOLOGIST

## 2018-05-20 PROCEDURE — 25000128 H RX IP 250 OP 636: Performed by: PHYSICIAN ASSISTANT

## 2018-05-20 PROCEDURE — 25000132 ZZH RX MED GY IP 250 OP 250 PS 637: Performed by: INTERNAL MEDICINE

## 2018-05-20 PROCEDURE — A9270 NON-COVERED ITEM OR SERVICE: HCPCS | Mod: GY | Performed by: PHYSICIAN ASSISTANT

## 2018-05-20 PROCEDURE — 92526 ORAL FUNCTION THERAPY: CPT | Mod: GN | Performed by: SPEECH-LANGUAGE PATHOLOGIST

## 2018-05-20 PROCEDURE — 85027 COMPLETE CBC AUTOMATED: CPT | Performed by: INTERNAL MEDICINE

## 2018-05-20 PROCEDURE — 97116 GAIT TRAINING THERAPY: CPT | Mod: GP

## 2018-05-20 PROCEDURE — 40000671 ZZH STATISTIC ANESTHESIA CASE

## 2018-05-20 PROCEDURE — 40000193 ZZH STATISTIC PT WARD VISIT

## 2018-05-20 PROCEDURE — 25000132 ZZH RX MED GY IP 250 OP 250 PS 637: Mod: GY | Performed by: PHYSICIAN ASSISTANT

## 2018-05-20 PROCEDURE — 37000011 ZZH ANESTHESIA WARD SERVICE: Performed by: NURSE ANESTHETIST, CERTIFIED REGISTERED

## 2018-05-20 RX ORDER — QUETIAPINE FUMARATE 25 MG/1
25 TABLET, FILM COATED ORAL 2 TIMES DAILY PRN
Status: DISCONTINUED | OUTPATIENT
Start: 2018-05-20 | End: 2018-05-23 | Stop reason: HOSPADM

## 2018-05-20 RX ADMIN — ENOXAPARIN SODIUM 40 MG: 40 INJECTION SUBCUTANEOUS at 15:08

## 2018-05-20 RX ADMIN — HYDROCHLOROTHIAZIDE 25 MG: 12.5 CAPSULE ORAL at 08:27

## 2018-05-20 RX ADMIN — AZITHROMYCIN MONOHYDRATE 250 MG: 500 INJECTION, POWDER, LYOPHILIZED, FOR SOLUTION INTRAVENOUS at 20:07

## 2018-05-20 RX ADMIN — LEVOTHYROXINE SODIUM 25 MCG: 25 TABLET ORAL at 08:27

## 2018-05-20 RX ADMIN — SIMVASTATIN 10 MG: 10 TABLET, FILM COATED ORAL at 21:37

## 2018-05-20 RX ADMIN — LISINOPRIL 20 MG: 20 TABLET ORAL at 08:27

## 2018-05-20 NOTE — PLAN OF CARE
Problem: Patient Care Overview  Goal: Plan of Care/Patient Progress Review  Outcome: No Change  Alert to self only.  Reorientation provided.  Regular diet.  Up with assist of 1 +gb/walker.  VSS on room air except low grade fever.  VPM present.  Frequently set off bed alarm.  Responded well to redirection.  Voided adequately.  Nursing will continue to monitor.

## 2018-05-20 NOTE — PROVIDER NOTIFICATION
"MD Notification    Notified Person: MD    Notified Person Name: Casper    Notification Date/Time: 1650 5/20/18    Notification Interaction: Paged physician    Purpose of Notification: Can we possibly get anything to calm pt down? Restless/yelling out \"help\" continuously and getting up out of bed/chair. Reorientation/redirection provided but not helping. Thanks.    Orders Received: PRN order for seroquel put in by MD    Comments: Pt has calmed down since and did not need to give the PRN seroquel.              "

## 2018-05-20 NOTE — PLAN OF CARE
Problem: Patient Care Overview  Goal: Plan of Care/Patient Progress Review  Outcome: No Change  Oriented to self, VSS on r/a, weaned from 2L this morning.  Denies pain, nausea, SOB.  WBC 15k this morning, down from 16.3K.  MD states, hold d/c plans pending clinical course.  VPM continued. Several attempts at redirection with patient unsuccessful.   PT and OT have seen patient today.  Potassium replacement needed now that patient has had meal.  Will report to oncoming RN for PO K+ replacement for value of 3.4 now.

## 2018-05-20 NOTE — PLAN OF CARE
"Problem: Patient Care Overview  Goal: Plan of Care/Patient Progress Review  Discharge Planner PT   Patient plan for discharge: Return home  Current status: Pt sitting up in chair upon PT arrival. Sats at rest on 2L O2 95%. Sit to stand with min assist; stand to sit with close SBA. Amb 125 ft x 1 with FWW and CGA while on 2L O2. Pt needs to pause briefly after every 4-5 steps. Sats post amb on 2L O2 92-93%. Tolerates seated exs well, however, attempted to perform sit to/from stand again for strengthening and pt stands half way up, then states \"I can't do it\" and sits back down. Pt remained sitting in chair at end of session with all needs in reach and chair alarm on.  Barriers to return to prior living situation: Falls risk, needs assist with mobility, increased confusion  Recommendations for discharge: TCU per the plan established by the Physical Therapist   Rationale for recommendations: Pt would benefit from continued PT to improve strength, balance, mobility to increase independence, reduce falls risk and increase safety before returning home.       Entered by: Emilia Lynne 05/20/2018 10:13 AM           "

## 2018-05-20 NOTE — PROGRESS NOTES
[facility]    Hospitalist Progress Note    Interval History   - Patient feeling well, pleasantly confused, family members have written note which patient reads to help keep oriented  - Needs 2L O2 today, respiratory rate in low 20s  - WBC minimal improvement 16k to 15k today    Assessment & Plan   Summary: Lyla Duran is a 89yo F with PMH of HTN, HLD, hearing loss, cognitive deficits, hypothyroidism, who was admitted on 5/17/18 with confusion, hypoxia, and possible fall, FTH community acquired PNA. Confusion improved with treatment with antibiotics. Patient continues to have leukocytosis and tachypnea.    Community-acquired pneumonia  Acute hypoxemic respiratory insufficiency  CXR with RLL and RML consolidation. showing consolidation in Rt lung base and right midlung suggesting PNA. Leukocytosis to 15k and 1-2L O2 requirements as well. Patient continues to have mild tachypnea with RR 20s. WBC minimal improvement 16k to 15k on 5/20, suggestive of a slowly improving pneumonia.  - Bcx no growth  - Encourage PO  - Ceftriaxone 2g IV daily, Azithromycin 500mg IV daily  - PT/OT recommend TCU      Acute normocytic anemia: Baseline approx 13.6, currently ~10-11, likely related to acute infection.    Chronic/Stable  Acute metabolic encephalopathy, resolved: Patient has baseline short term memory issues, and family have been providing patient with written notes to keep patient oriented at the hospital. Noted confusion on admission which improved with abx treatment; per family patient is near baseline.      Hypertension: HCTZ and lisinopril resumed 5/19. SBP 140s on 5/20.    DVT Prophylaxis: Enoxaparin (Lovenox) SQ  Code Status: DNR/DNI  PT/OT: Home    Disposition: Expected discharge tomorrow, DEMI vs TCU, discussing with family    Baldo Shirley MD  Text Page  (7am to 6pm)  -Data reviewed today: I reviewed all new labs and imaging results over the last 24 hours.    # Pain Assessment:  Current Pain Score  5/20/2018   Patient currently in pain? denies   Pain score (0-10) 0   Lyla foy pain level was assessed and she currently denies pain.        Physical Exam   Temp: 97.7  F (36.5  C) Temp src: Oral BP: 150/80   Heart Rate: 81 Resp: 20 SpO2: 92 % O2 Device: Nasal cannula Oxygen Delivery: 2 LPM  Vitals:    05/18/18 0653 05/19/18 0500 05/20/18 0612   Weight: 61.8 kg (136 lb 3.9 oz) 62.8 kg (138 lb 7.2 oz) 62.3 kg (137 lb 5.6 oz)     Vital Signs with Ranges  Temp:  [97.7  F (36.5  C)-100  F (37.8  C)] 97.7  F (36.5  C)  Heart Rate:  [81-87] 81  Resp:  [20-22] 20  BP: (138-150)/(60-80) 150/80  SpO2:  [86 %-93 %] 92 %  I/O last 3 completed shifts:  In: 450 [P.O.:450]  Out: 200 [Urine:200]  O2 requirements: Yes    Constitutional: Female in NAD  HEENT: Eyes nonicteric, oral mucosa moist  Cardiovascular: RRR, normal S1/2, no m/r/g  Respiratory: Bibasilar crackles, mild tachypnea without accessory muscle use  Vascular: No LE pitting edema, noted distal pedal pulses  GI: Normoactive bowel sounds, nontender, nondistended  Skin/Integumen: No rashes or scars  Neuro/Psych: Appropriate affect and mood. A&Ox2, moves all extremities    Medications     - MEDICATION INSTRUCTIONS -         azithromycin  250 mg Intravenous Q24H     cefTRIAXone  2 g Intravenous Q24H     enoxaparin  40 mg Subcutaneous Q24H     hydrochlorothiazide  25 mg Oral Daily     levothyroxine  25 mcg Oral Daily     lisinopril  20 mg Oral Daily     simvastatin  10 mg Oral At Bedtime       Data     Recent Labs  Lab 05/20/18  0844 05/19/18  0750 05/18/18  1815 05/18/18  0852 05/17/18  1521   WBC 15.1* 16.3*  --  13.0* 12.7*   HGB 11.1* 10.6*  --  10.1* 11.1*   MCV 91 92  --  92 91    197  --  195 193     --   --  140 136   POTASSIUM 3.4 4.0 3.9 2.9* 3.5   CHLORIDE 102  --   --  106 100   CO2 23  --   --  23 27   BUN 14  --   --  32* 38*   CR 0.60  --   --  0.73 1.12*   ANIONGAP 10  --   --  11 9   TATYANA 8.3*  --   --  8.2* 8.9   *  --   --  188* 173*        Imaging:   No results found for this or any previous visit (from the past 24 hour(s)).

## 2018-05-20 NOTE — PROGRESS NOTES
Care Transition Initial Assessment -   Reason For Consult: discharge planning  Spoke with:  GeethaJayna perkins  Active Problems:    Pneumonia       DATA  Lives With: alone  Living Arrangements: independent living facility  Description of Support System: Supportive, Involved     Identified issues/concerns regarding health management: SW consult done, per protocol for discharge planning needs. Patient is anticipated to be ready for D/C in the next 24 hours. PT/OT recommend a TCU stay or 24 hour assist for all mobility and home PT/OT.  Spoke with prasanna Jayna who states at baseline patient gets private pay nursing visits 3 times weekly for 3-4 hours. Jayna states she believes patient does best in her own home with her own routines and she plans to increase her assistance to 24 hours initially. This will be a combination of family and paid visits. Jayna agrees to a home care agency to provide RN, PT and OT visits. Medicare guidelines (pt. has [employer]) for coverage reviewed. Offered a choice of agencies and Jayna would like to use [facility]. A referral has been sent. Jayna plans to transport patient home. Noted patient is currently on O2, but she does not use home O2. Jayna is hopefull this can be discontinued by D/C.    Quality Of Family Relationships: supportive, helpful, involved     ASSESSMENT  Cognitive Status: Did not see patient as she is alert to self only   Concerns to be addressed: Care Transitions will follow to coordinate the D/C   PLAN  Financial costs for the patient includes: Private pay home visits  Patient/family given options and choices for discharge: Yes  Patient/family is agreeable to the plan? Yes  Patient/family Goals and Preferences: Home with home care

## 2018-05-20 NOTE — PLAN OF CARE
Problem: Patient Care Overview  Goal: Plan of Care/Patient Progress Review  Discharge Planner SLP   Patient plan for discharge: Patient not able to state.   Current status:  Patient seen bedside and continues to demonstrate dysphonia conversation with confusion. She had prolonged mastication of fish and was unable to full chew it and spit it out. Did not attempt the green beans. Coughed x1 with thin liquids when there was particals of fish in her mouth. She would benefit from a diet downgrade. She is selecting to eat the soft foods on her tray like mashed potatoes and ice cream. Education provided to her niece on the Sx of aspiration. Chewing difficulties and handout given on a DDL 3 with thin liquids. Recommend: 1. Downgrade diet to a DDL 3 with thin liquids. 2. Up in a chair for all meals, small bites/sips, clear oral cavity before the next bite. 3. SLP will f/u for one more session to insure diet tolerance.   Barriers to return to prior living situation: Cognition/safety.  Recommendations for discharge: TCU but family wants her back in her apartment. Will need 24 hour supervision. She has meals in a dinning room in her complex. Anticipate goals to be met.   Rationale for recommendations: Home with 24/7 supervision.        Entered by: Esther Kang 05/20/2018 2:45 PM

## 2018-05-20 NOTE — PLAN OF CARE
Problem: Patient Care Overview  Goal: Plan of Care/Patient Progress Review  Discharge Planner OT   Patient plan for discharge: None stated  Current status:  Pt required step by step cues for safety with completing transfer and mobility to/from bathroom, sequencing of ADL task while standing at sink for grooming/hygiene task.   Barriers to return to prior living situation: Level of assist, pt lives alone, fall risk, cognition  Recommendations for discharge: TCU; family is concerned that pt confusion is situational, if pt returns home recommend 24 hour assist for all mobility and ADLs as well as home OT/PT per plan established by the Occupational Therapist  Rationale for recommendations: Pt would benefit from daily therapies to increase safety and IND in ADLs to return to PLOF, pt would benefit from TCU, however, if family prefers home to manage confusion and resume routine pt will require strict 24 hour assist for safety and home OT to increase IND.      Entered by: Minerva Simpson 05/20/2018 11:37 AM

## 2018-05-21 ENCOUNTER — TRANSFERRED RECORDS (OUTPATIENT)
Dept: HEALTH INFORMATION MANAGEMENT | Facility: CLINIC | Age: 83
End: 2018-05-21

## 2018-05-21 ENCOUNTER — APPOINTMENT (OUTPATIENT)
Dept: GENERAL RADIOLOGY | Facility: CLINIC | Age: 83
DRG: 177 | End: 2018-05-21
Attending: INTERNAL MEDICINE
Payer: MEDICARE

## 2018-05-21 LAB
ANION GAP SERPL CALCULATED.3IONS-SCNC: 7 MMOL/L (ref 3–14)
BASE EXCESS BLDV CALC-SCNC: 4.8 MMOL/L
BUN SERPL-MCNC: 11 MG/DL (ref 7–30)
CALCIUM SERPL-MCNC: 8.1 MG/DL (ref 8.5–10.1)
CHLORIDE SERPL-SCNC: 103 MMOL/L (ref 94–109)
CO2 SERPL-SCNC: 28 MMOL/L (ref 20–32)
CREAT SERPL-MCNC: 0.54 MG/DL (ref 0.52–1.04)
ERYTHROCYTE [DISTWIDTH] IN BLOOD BY AUTOMATED COUNT: 13.1 % (ref 10–15)
GFR SERPL CREATININE-BSD FRML MDRD: >90 ML/MIN/1.7M2
GLUCOSE SERPL-MCNC: 98 MG/DL (ref 70–99)
HCO3 BLDV-SCNC: 28 MMOL/L (ref 21–28)
HCT VFR BLD AUTO: 30.8 % (ref 35–47)
HGB BLD-MCNC: 10.5 G/DL (ref 11.7–15.7)
LACTATE BLD-SCNC: 1 MMOL/L (ref 0.4–1.9)
MCH RBC QN AUTO: 31.1 PG (ref 26.5–33)
MCHC RBC AUTO-ENTMCNC: 34.1 G/DL (ref 31.5–36.5)
MCV RBC AUTO: 91 FL (ref 78–100)
OXYHGB MFR BLDV: 88 %
PCO2 BLDV: 34 MM HG (ref 40–50)
PH BLDV: 7.52 PH (ref 7.32–7.43)
PLATELET # BLD AUTO: 236 10E9/L (ref 150–450)
PO2 BLDV: 52 MM HG (ref 25–47)
POTASSIUM SERPL-SCNC: 3.3 MMOL/L (ref 3.4–5.3)
POTASSIUM SERPL-SCNC: 4.1 MMOL/L (ref 3.4–5.3)
RBC # BLD AUTO: 3.38 10E12/L (ref 3.8–5.2)
SODIUM SERPL-SCNC: 138 MMOL/L (ref 133–144)
WBC # BLD AUTO: 13.9 10E9/L (ref 4–11)

## 2018-05-21 PROCEDURE — A9270 NON-COVERED ITEM OR SERVICE: HCPCS | Mod: GY | Performed by: INTERNAL MEDICINE

## 2018-05-21 PROCEDURE — 25000132 ZZH RX MED GY IP 250 OP 250 PS 637: Mod: GY | Performed by: INTERNAL MEDICINE

## 2018-05-21 PROCEDURE — 36415 COLL VENOUS BLD VENIPUNCTURE: CPT | Performed by: INTERNAL MEDICINE

## 2018-05-21 PROCEDURE — 85027 COMPLETE CBC AUTOMATED: CPT | Performed by: INTERNAL MEDICINE

## 2018-05-21 PROCEDURE — 12000000 ZZH R&B MED SURG/OB

## 2018-05-21 PROCEDURE — 84132 ASSAY OF SERUM POTASSIUM: CPT | Performed by: INTERNAL MEDICINE

## 2018-05-21 PROCEDURE — 25000128 H RX IP 250 OP 636: Performed by: PHYSICIAN ASSISTANT

## 2018-05-21 PROCEDURE — 80048 BASIC METABOLIC PNL TOTAL CA: CPT | Performed by: INTERNAL MEDICINE

## 2018-05-21 PROCEDURE — 82565 ASSAY OF CREATININE: CPT | Performed by: INTERNAL MEDICINE

## 2018-05-21 PROCEDURE — 25800030 ZZH RX IP 258 OP 636: Performed by: INTERNAL MEDICINE

## 2018-05-21 PROCEDURE — 25000128 H RX IP 250 OP 636: Performed by: INTERNAL MEDICINE

## 2018-05-21 PROCEDURE — 71046 X-RAY EXAM CHEST 2 VIEWS: CPT

## 2018-05-21 PROCEDURE — A9270 NON-COVERED ITEM OR SERVICE: HCPCS | Mod: GY | Performed by: PHYSICIAN ASSISTANT

## 2018-05-21 PROCEDURE — 83605 ASSAY OF LACTIC ACID: CPT | Performed by: INTERNAL MEDICINE

## 2018-05-21 PROCEDURE — 82805 BLOOD GASES W/O2 SATURATION: CPT | Performed by: INTERNAL MEDICINE

## 2018-05-21 PROCEDURE — 99232 SBSQ HOSP IP/OBS MODERATE 35: CPT | Performed by: INTERNAL MEDICINE

## 2018-05-21 PROCEDURE — 25000132 ZZH RX MED GY IP 250 OP 250 PS 637: Mod: GY | Performed by: PHYSICIAN ASSISTANT

## 2018-05-21 RX ORDER — AMPICILLIN AND SULBACTAM 2; 1 G/1; G/1
3 INJECTION, POWDER, FOR SOLUTION INTRAMUSCULAR; INTRAVENOUS EVERY 6 HOURS
Status: DISCONTINUED | OUTPATIENT
Start: 2018-05-21 | End: 2018-05-23 | Stop reason: HOSPADM

## 2018-05-21 RX ORDER — CARBOXYMETHYLCELLULOSE SODIUM 5 MG/ML
1 SOLUTION/ DROPS OPHTHALMIC 3 TIMES DAILY PRN
Status: DISCONTINUED | OUTPATIENT
Start: 2018-05-21 | End: 2018-05-21

## 2018-05-21 RX ADMIN — HYDROCHLOROTHIAZIDE 25 MG: 12.5 CAPSULE ORAL at 09:08

## 2018-05-21 RX ADMIN — POTASSIUM CHLORIDE 40 MEQ: 1.5 POWDER, FOR SOLUTION ORAL at 09:07

## 2018-05-21 RX ADMIN — DEXTRAN 70, AND HYPROMELLOSE 2910 1 DROP: 1; 3 SOLUTION/ DROPS OPHTHALMIC at 11:34

## 2018-05-21 RX ADMIN — AMPICILLIN SODIUM AND SULBACTAM SODIUM 3 G: 2; 1 INJECTION, POWDER, FOR SOLUTION INTRAMUSCULAR; INTRAVENOUS at 17:14

## 2018-05-21 RX ADMIN — LEVOTHYROXINE SODIUM 25 MCG: 25 TABLET ORAL at 09:08

## 2018-05-21 RX ADMIN — LISINOPRIL 20 MG: 20 TABLET ORAL at 09:08

## 2018-05-21 RX ADMIN — ENOXAPARIN SODIUM 40 MG: 40 INJECTION SUBCUTANEOUS at 13:44

## 2018-05-21 RX ADMIN — QUETIAPINE FUMARATE 25 MG: 25 TABLET ORAL at 20:16

## 2018-05-21 RX ADMIN — SIMVASTATIN 10 MG: 10 TABLET, FILM COATED ORAL at 20:16

## 2018-05-21 RX ADMIN — AMPICILLIN SODIUM AND SULBACTAM SODIUM 3 G: 2; 1 INJECTION, POWDER, FOR SOLUTION INTRAMUSCULAR; INTRAVENOUS at 22:18

## 2018-05-21 RX ADMIN — POTASSIUM CHLORIDE 20 MEQ: 1.5 POWDER, FOR SOLUTION ORAL at 10:53

## 2018-05-21 RX ADMIN — AMPICILLIN SODIUM AND SULBACTAM SODIUM 3 G: 2; 1 INJECTION, POWDER, FOR SOLUTION INTRAMUSCULAR; INTRAVENOUS at 10:53

## 2018-05-21 RX ADMIN — AZITHROMYCIN MONOHYDRATE 250 MG: 500 INJECTION, POWDER, LYOPHILIZED, FOR SOLUTION INTRAVENOUS at 19:06

## 2018-05-21 NOTE — PLAN OF CARE
Problem: Patient Care Overview  Goal: Plan of Care/Patient Progress Review  PT-  Pt sleeping upon arrival.  Able to awaken but pt strongly declined walking at this time.  Pt pulled the covers up over her more and stated she needed to sleep.

## 2018-05-21 NOTE — PLAN OF CARE
Problem: Patient Care Overview  Goal: Plan of Care/Patient Progress Review  Outcome: Improving  Pt Alert, oriented to self, forgetful and needs reoientation/redirection. VPM in place. VSS on 1/2 L, pt desats below 92% on RA. Up with 1 assist to chair for dinner. Pt denies pain. DD3 diet, fair appetite. Voiding, 1x loose/watery BM, hat placed in bathroom for next BM to monitor. Some fecal and urinary incontinence. IV SL, IV abx. WBC 15.1 today. K+ 3.4 today, did not replace per protocol. SW consulted. Anticipated D/C per MD pending. Nursing continue to monitor.  Rash noticed on pt's back this evening. Asymptomatic, denies itching. Sticky note placed for physician, nursing to monitor.

## 2018-05-21 NOTE — PROGRESS NOTES
[facility]    Hospitalist Progress Note    Interval History   - Noted mildly agitated/confused overnight, improved with Seroquel  - Off O2 this morning, however per nurse patient is weaker than on admission  - WBC minimal improvement 15k to 14k today  - Having some red itchy eyes    Assessment & Plan   Summary: Lyla Duran is a 89yo F with PMH of HTN, HLD, hearing loss, cognitive deficits, hypothyroidism, who was admitted on 5/17/18 with confusion, hypoxia, and possible fall, FTH community acquired PNA. Confusion improved with treatment with antibiotics. Patient continues to have leukocytosis. Speech path--noted dysphagia. Changing abx treatment on 5/21.    Pneumonia, possibly community acquired, possibly aspiration  Acute hypoxemic respiratory insufficiency, improved  Admission CXR 5/17 with RLL and RML consolidation, repeat 5/18 and 5/20 without significant change. Leukocytosis to 15k and 1-2L O2 requirements as well. Patient continues to have mild tachypnea with RR 20s. WBC minimal improvement 16k to 14k on 5/21, suggestive of a slowly improving pneumonia. Patient was noted to have fairly significant dysphagia-now on DD3 diet--unclear whether dysphagia was cause of PNA such as aspiration, or brought on by her pneumonia. Addendum: discussed with REJI Chantale, patient did have a choking episode with a donut prior to her decomensating. Likelihood of aspiration pneumonia increased based on this history.  - Bcx no growth, no sputum samples due to no significant sputum  - Encourage PO  - Ceftriaxone switched to Unasyn for improved aspiration PNA coverage  - Continue Azithromycin 500mg IV daily  - Overnight oximetry ordered    Back rash: Noted scattered maculopapular rash over back but not on flank neck, arms--does not appear to be antibiotic related and possibly related to pressure laying in bed  - Nurse will continue to monitor, eucerin PRN      Chronic/Stable  Acute normocytic anemia: Baseline approx  13.6, currently ~10-11, likely related to acute infection.  Acute metabolic encephalopathy, resolved: Patient has baseline short term memory issues, and family have been providing patient with written notes to keep patient oriented at the hospital. Noted confusion on admission which improved with abx treatment; per family patient is near baseline.  Hypertension: HCTZ and lisinopril resumed 5/19. SBP 140s on 5/21.    DVT Prophylaxis: Enoxaparin (Lovenox) SQ  Code Status: DNR/DNI  PT/OT: TCU    Disposition: Expected discharge tomorrow, DEMI vs TCU. PT/OT recommend TCU, family would like patient to return home due to her dementia and are trying to set up 24hr assist    Baldo Shirley MD  Text Page  (7am to 6pm)  -Data reviewed today: I reviewed all new labs and imaging results over the last 24 hours.    # Pain Assessment:  Current Pain Score 5/21/2018   Patient currently in pain? denies   Pain score (0-10) -   Lyla foy pain level was assessed and she currently denies pain.        Physical Exam   Temp: 99  F (37.2  C) Temp src: Oral BP: 147/68   Heart Rate: 74 Resp: 22 SpO2: 90 % O2 Device: None (Room air) Oxygen Delivery: 2 LPM  Vitals:    05/19/18 0500 05/20/18 0612 05/21/18 0642   Weight: 62.8 kg (138 lb 7.2 oz) 62.3 kg (137 lb 5.6 oz) 63.1 kg (139 lb 1.8 oz)     Vital Signs with Ranges  Temp:  [98.6  F (37  C)-99.2  F (37.3  C)] 99  F (37.2  C)  Heart Rate:  [74-87] 74  Resp:  [20-24] 22  BP: (120-166)/(61-71) 147/68  SpO2:  [86 %-95 %] 90 %  I/O last 3 completed shifts:  In: 220 [P.O.:220]  Out: 550 [Urine:550]  O2 requirements: Yes    Constitutional: Female in NAD  HEENT: Eyes nonicteric but have mild conjunctival erythema erythema without drainage, oral mucosa moist  Cardiovascular: RRR, normal S1/2, no m/r/g  Respiratory: Decreased air entry at bases without clear crackles  Vascular: Trace LE pitting edema, noted distal pedal pulses  GI: Normoactive bowel sounds, nontender, nondistended  Skin/Integumen:  Maculopapular rash over back and not on flanks  Neuro/Psych: Appropriate affect and mood. A&O to self only, moves all extremities    Medications     - MEDICATION INSTRUCTIONS -         azithromycin  250 mg Intravenous Q24H     cefTRIAXone  2 g Intravenous Q24H     enoxaparin  40 mg Subcutaneous Q24H     hydrochlorothiazide  25 mg Oral Daily     levothyroxine  25 mcg Oral Daily     lisinopril  20 mg Oral Daily     simvastatin  10 mg Oral At Bedtime       Data     Recent Labs  Lab 05/21/18  0750 05/20/18  0844 05/19/18  0750  05/18/18  0852   WBC 13.9* 15.1* 16.3*  --  13.0*   HGB 10.5* 11.1* 10.6*  --  10.1*   MCV 91 91 92  --  92    227 197  --  195    135  --   --  140   POTASSIUM 3.3* 3.4 4.0  < > 2.9*   CHLORIDE 103 102  --   --  106   CO2 28 23  --   --  23   BUN 11 14  --   --  32*   CR 0.54 0.60  --   --  0.73   ANIONGAP 7 10  --   --  11   TATYANA 8.1* 8.3*  --   --  8.2*   GLC 98 153*  --   --  188*   < > = values in this interval not displayed.    Imaging:   No results found for this or any previous visit (from the past 24 hour(s)).

## 2018-05-21 NOTE — PLAN OF CARE
Problem: Patient Care Overview  Goal: Plan of Care/Patient Progress Review  Outcome: No Change  A&O to self only.  Reorientation provided, calm and cooperative with cares. Up with assist of 1 +gb/walker.  DD3 thin liquid diet.  VSS on 1L oxymask except for HTN.  Desats to 86% on room air when sleeping.  VPM present in the room. Denies pain. Voiding adequately, continent overnight.  PIV saline locked.  Nursing will continue to monitor.

## 2018-05-21 NOTE — ANESTHESIA CARE TRANSFER NOTE
Patient: Lyla Duran    * No procedures listed *    Diagnosis: * No pre-op diagnosis entered *  Diagnosis Additional Information: No value filed.    Anesthesia Type:   No value filed.     Note:  Airway :Nasal Cannula  Destination: did not transfer pt.  Comments: PIV started in pt's room. RN notified of placement. Pt awake and talking throughout PIV placement.       Vitals: (Last set prior to Anesthesia Care Transfer)              Electronically Signed By: AUSTEN Bains CRNA  May 20, 2018  7:14 PM

## 2018-05-21 NOTE — PLAN OF CARE
Problem: Patient Care Overview  Goal: Plan of Care/Patient Progress Review  Outcome: No Change  Care Plan Summary Note: sepsis fired this am for WBC and RR, lactic 1.0. VBG and CXR reviewed by MD, IV abx changed to Unasyn. Ax1 with walker and GB. PIV patent and SL. Very fine crackles noted on left lower lobe. Weaned off O2. Will do over night oxymetry tonight to determine O2 need at [facility] (RT will set up). Alert to self only, very forgetful. Notes written to remind pt from Niece which helped in reorient pt. Up in chair for all meals, NO STRAW. +BS,very poor appetite. Ambulated in unit once with staff. Very fearful of falling. Rash on back assessed by MD, not r/t medication, will monitor and apply lotion. Red, watery eyes, eye drops applied with relief. Results improving. K+ 3.3, replaced and recheck ordered for 1500. Will observe another day, pending d/c in am to halfway with 24 hour supervision. Monitor.

## 2018-05-21 NOTE — PLAN OF CARE
Problem: Patient Care Overview  Goal: Plan of Care/Patient Progress Review  SLP: Pt resting comfortably and did not attempt to wake pt. Will reschedule to follow at a meal time as able. Pt may benefit from  SLP services to ensure diet tolerance and train pt/caregivers on diet level and safe swallow strategies.

## 2018-05-21 NOTE — PROGRESS NOTES
Planning tentative discharge to home tomorrow.  PEEWEE had met with pt's Niece Jayna over the weekend.  She wants pt to transition home with 24 hr care.  Jayna is setting up additional private pay care through [facility].  Pt will have overnight oximetry tonight due to nocturnal desats recorded.  Jayna would like to talk with EPEWEE again.  Planning [facility] RN/PT/OT.  Follow up appointment has been scheduled for 5/25/18.

## 2018-05-22 ENCOUNTER — APPOINTMENT (OUTPATIENT)
Dept: OCCUPATIONAL THERAPY | Facility: CLINIC | Age: 83
DRG: 177 | End: 2018-05-22
Payer: MEDICARE

## 2018-05-22 LAB
ANION GAP SERPL CALCULATED.3IONS-SCNC: 11 MMOL/L (ref 3–14)
BUN SERPL-MCNC: 10 MG/DL (ref 7–30)
CALCIUM SERPL-MCNC: 8.3 MG/DL (ref 8.5–10.1)
CHLORIDE SERPL-SCNC: 105 MMOL/L (ref 94–109)
CO2 SERPL-SCNC: 25 MMOL/L (ref 20–32)
CREAT SERPL-MCNC: 0.65 MG/DL (ref 0.52–1.04)
ERYTHROCYTE [DISTWIDTH] IN BLOOD BY AUTOMATED COUNT: 13.2 % (ref 10–15)
GFR SERPL CREATININE-BSD FRML MDRD: 85 ML/MIN/1.7M2
GLUCOSE SERPL-MCNC: 93 MG/DL (ref 70–99)
HCT VFR BLD AUTO: 34.1 % (ref 35–47)
HGB BLD-MCNC: 11.4 G/DL (ref 11.7–15.7)
MCH RBC QN AUTO: 30.7 PG (ref 26.5–33)
MCHC RBC AUTO-ENTMCNC: 33.4 G/DL (ref 31.5–36.5)
MCV RBC AUTO: 92 FL (ref 78–100)
NT-PROBNP SERPL-MCNC: 1931 PG/ML (ref 0–1800)
PLATELET # BLD AUTO: 291 10E9/L (ref 150–450)
POTASSIUM SERPL-SCNC: 3.9 MMOL/L (ref 3.4–5.3)
PROCALCITONIN SERPL-MCNC: 0.18 NG/ML
RBC # BLD AUTO: 3.71 10E12/L (ref 3.8–5.2)
SODIUM SERPL-SCNC: 141 MMOL/L (ref 133–144)
WBC # BLD AUTO: 14.6 10E9/L (ref 4–11)

## 2018-05-22 PROCEDURE — 85027 COMPLETE CBC AUTOMATED: CPT | Performed by: INTERNAL MEDICINE

## 2018-05-22 PROCEDURE — 99233 SBSQ HOSP IP/OBS HIGH 50: CPT | Performed by: HOSPITALIST

## 2018-05-22 PROCEDURE — 25000128 H RX IP 250 OP 636: Performed by: PHYSICIAN ASSISTANT

## 2018-05-22 PROCEDURE — 83880 ASSAY OF NATRIURETIC PEPTIDE: CPT | Performed by: INTERNAL MEDICINE

## 2018-05-22 PROCEDURE — 84145 PROCALCITONIN (PCT): CPT | Performed by: INTERNAL MEDICINE

## 2018-05-22 PROCEDURE — A9270 NON-COVERED ITEM OR SERVICE: HCPCS | Mod: GY | Performed by: PHYSICIAN ASSISTANT

## 2018-05-22 PROCEDURE — 80048 BASIC METABOLIC PNL TOTAL CA: CPT | Performed by: INTERNAL MEDICINE

## 2018-05-22 PROCEDURE — 36415 COLL VENOUS BLD VENIPUNCTURE: CPT | Performed by: INTERNAL MEDICINE

## 2018-05-22 PROCEDURE — A9270 NON-COVERED ITEM OR SERVICE: HCPCS | Mod: GY | Performed by: INTERNAL MEDICINE

## 2018-05-22 PROCEDURE — 25000132 ZZH RX MED GY IP 250 OP 250 PS 637: Mod: GY | Performed by: INTERNAL MEDICINE

## 2018-05-22 PROCEDURE — 25000128 H RX IP 250 OP 636: Performed by: HOSPITALIST

## 2018-05-22 PROCEDURE — 25000128 H RX IP 250 OP 636: Performed by: INTERNAL MEDICINE

## 2018-05-22 PROCEDURE — 83880 ASSAY OF NATRIURETIC PEPTIDE: CPT | Performed by: HOSPITALIST

## 2018-05-22 PROCEDURE — A9270 NON-COVERED ITEM OR SERVICE: HCPCS | Mod: GY | Performed by: HOSPITALIST

## 2018-05-22 PROCEDURE — 97535 SELF CARE MNGMENT TRAINING: CPT | Mod: GO | Performed by: OCCUPATIONAL THERAPY ASSISTANT

## 2018-05-22 PROCEDURE — 12000000 ZZH R&B MED SURG/OB

## 2018-05-22 PROCEDURE — 25000132 ZZH RX MED GY IP 250 OP 250 PS 637: Performed by: HOSPITALIST

## 2018-05-22 PROCEDURE — 25000132 ZZH RX MED GY IP 250 OP 250 PS 637: Performed by: PHYSICIAN ASSISTANT

## 2018-05-22 PROCEDURE — 40000133 ZZH STATISTIC OT WARD VISIT: Performed by: OCCUPATIONAL THERAPY ASSISTANT

## 2018-05-22 RX ORDER — FUROSEMIDE 20 MG
20 TABLET ORAL ONCE
Status: COMPLETED | OUTPATIENT
Start: 2018-05-22 | End: 2018-05-22

## 2018-05-22 RX ORDER — FUROSEMIDE 10 MG/ML
20 INJECTION INTRAMUSCULAR; INTRAVENOUS
Status: DISCONTINUED | OUTPATIENT
Start: 2018-05-22 | End: 2018-05-23 | Stop reason: HOSPADM

## 2018-05-22 RX ADMIN — AMPICILLIN SODIUM AND SULBACTAM SODIUM 3 G: 2; 1 INJECTION, POWDER, FOR SOLUTION INTRAMUSCULAR; INTRAVENOUS at 04:53

## 2018-05-22 RX ADMIN — AMPICILLIN SODIUM AND SULBACTAM SODIUM 3 G: 2; 1 INJECTION, POWDER, FOR SOLUTION INTRAMUSCULAR; INTRAVENOUS at 22:11

## 2018-05-22 RX ADMIN — ENOXAPARIN SODIUM 40 MG: 40 INJECTION SUBCUTANEOUS at 18:28

## 2018-05-22 RX ADMIN — FUROSEMIDE 20 MG: 20 TABLET ORAL at 09:08

## 2018-05-22 RX ADMIN — HYDROCHLOROTHIAZIDE 25 MG: 12.5 CAPSULE ORAL at 09:08

## 2018-05-22 RX ADMIN — SIMVASTATIN 10 MG: 10 TABLET, FILM COATED ORAL at 20:20

## 2018-05-22 RX ADMIN — FUROSEMIDE 20 MG: 10 INJECTION, SOLUTION INTRAMUSCULAR; INTRAVENOUS at 15:38

## 2018-05-22 RX ADMIN — AMPICILLIN SODIUM AND SULBACTAM SODIUM 3 G: 2; 1 INJECTION, POWDER, FOR SOLUTION INTRAMUSCULAR; INTRAVENOUS at 11:09

## 2018-05-22 RX ADMIN — LEVOTHYROXINE SODIUM 25 MCG: 25 TABLET ORAL at 09:08

## 2018-05-22 RX ADMIN — LISINOPRIL 20 MG: 20 TABLET ORAL at 09:08

## 2018-05-22 RX ADMIN — AMPICILLIN SODIUM AND SULBACTAM SODIUM 3 G: 2; 1 INJECTION, POWDER, FOR SOLUTION INTRAMUSCULAR; INTRAVENOUS at 15:38

## 2018-05-22 NOTE — PLAN OF CARE
Problem: Patient Care Overview  Goal: Plan of Care/Patient Progress Review  Outcome: No Change  A&Ox1 and restless at times, up with Ax1 walker. VSS on RA at rest. Plan to continue IV abx and IV lasix tonight, with discharge tomorrow. Will continue to monitor.

## 2018-05-22 NOTE — PLAN OF CARE
Problem: Patient Care Overview  Goal: Plan of Care/Patient Progress Review  Outcome: No Change  A&O to self only.  Reorientation provided, redirected easily.  Calm and cooperative for cares. DD3 diet with thin liquids.  Up with assist of 1 +gb/walker.  VSS on 1L nc except for low grade fever.  Pt on oximetry study overnight, oxygen sats 92%-95% on 1L nc. Denies pain.  Denies SOB.  VPM present in the room.  PIV saline locked.  IV abx.  Nursing will continue to monitor.

## 2018-05-22 NOTE — PLAN OF CARE
Problem: Patient Care Overview  Goal: Plan of Care/Patient Progress Review  Outcome: Declining  Pt Alert, only oriented to self. Forgetful/shortterm memory loss. Reorientation and redirection provided. VPM in place. VSS on RA. On 1L O2 while sleeping -- desat to 89% on RA. Up with 1 assist to bathroom. Loose stools. Encouraged pt to ambulated but pt continued to declined going for a walk. Pt denies pain. Regular diet good appetite. IV abx, otherwise SL. Repeat chest xray today showed worsening pneumonia. Fine crackles in LLL. Nonproductive infrequent cough. Oximetry study ordered for tonight. 1x PRN seroquel for agitation at HS. Anticipated D/C per MD pending. Nursing continue to monitor.

## 2018-05-22 NOTE — PROGRESS NOTES
[facility]  Hospitalist Progress Note   05/22/2018          Assessment and Plan:       Lyla Duran is a 89 year old female with medical history of HTN, HLD, hearing loss, cognitive deficits, hypothyroidism admitted on 5/17/18 with confusion and possible fall prior to admission.    Pneumonia, possibly community acquired versus aspiration  Acute hypoxemic respiratory insufficiency, improving  Reactive leukocytosis from pneumonia.  Admission CXR 5/17 with RLL and RML consolidation  Repeat imaging 5/18 and 5/20 without significant change.   WBC minimal improvement 16k to 14k, suggestive of a slowly improving pneumonia. Has been requiring 1 L of nasal oxygen.  Bcx no growth, no sputum samples due to no significant sputum  Ceftriaxone (5/17 - 5/20) switched to Unasyn (5/21)for improved aspiration PNA coverage. Will de-escalate to oral antibiotics tomorrow at the time of discharge to complete a total of 10 days.  Discontinue Azithromycin 500mg IV (5/18 - 5/22)  Will check Pro calcitonin levels  Wean nasal oxygen as tolerated, goal oxygen saturation's greater than 90%.  Aggressive incentive spirometry/a cappella.  Ambulate out of bed to chair.    Mild volume overload from fluid resuscitation.   Patient has no shortness of breath, having oxygen saturation's drop to high 80s on minimal ambulation.  Has had overnight pulse oximetry, oxygen saturation's between 90 to 95% on 1 L nasal oxygen.  Diuresis with 20 mg IV Lasix BID.  Closely monitor electrolytes.    Oropharyngeal dysphagia.   As per patient's daughter, patient's swallowing has been declining, has food particles in her mouth intermittently.  Speech therapy assessment appreciated, recommended to downgrade to dysphagia three diet with thin liquids.   Sit up in chair for all means, strict aspiration precautions.  Follow-up that speech therapy outpatient as needed    Back rash-erythema improving.  Noted scattered maculopapular rash over back but not on  flank neck, arms--does not appear to be antibiotic related and possibly related to pressure laying in bed  Nurse will continue to monitor, eucerin PRN    Acute normocytic anemia: likely dilutional/acute illness  Baseline hemoglobin around 13.0.  Currently ~10-11, likely related to acute infection.    Acute metabolic encephalopathy/delirium, improving  possible fall prior to admission history of dementia  patient was noted with a couple of bruises on her legs and family wondered if she had a fall prior to admission.  Patient has baseline short term memory issues, and family have been providing patient with written notes to keep patient oriented at the hospital.   Noted confusion on admission which improved with antibiotic treatment; per family patient is almost near baseline.  CT of the head on admission showed no acute intracranial abnormality.  Frequent reorientation.  Minimize interruptions.  PRN Seroquel.    Hypertension:   Continue PTA lisinopril   hold PTA hydrochlorothiazide while on diuresis with IV Lasix     Deconditioned from acute illness/chronic debility.  PT/OT recommend TCU  patient's daughter, sister would like to take her home, and have been trying to set up 24 into seven assistance [anticipating that services would start tomorrow]  Fall precautions.    # Pain Assessment:  Current Pain Score 5/22/2018   Patient currently in pain? denies   Pain score (0-10) -   Lyla foy pain level was assessed and she currently denies pain.      Active Diet Order      Combination Diet Dysphagia Diet Level 3: Advanced; Thin Liquids (water, ice chips, juice, milk gelatin, ice cream, etc)    DVT Prophylaxis:  pneumatic compression device, subcutaneous Lovenox  Code Status: DNR/DNI  Disposition: Expected discharge tomorrow if uneventful stay overnight.     Patient, her daughter Mary, , interdisciplinary team involved in care and agrees with plan.  Total time - Greater than 35 min. More than 50% of time spent in  direct patient care, care coordination, patient/caregiver counseling, and formalizing plan of care.     Chel Garcia MD        Interval History:      patient appears comfortable, sitting up in the chair.  Tolerating oral breakfast.  On 1 L nasal oxygen. Saturating at 95%. Drops to 88% on room air during activity or with exercise.         Physical Exam:        Physical Exam   Temp:  [98.8  F (37.1  C)-99.7  F (37.6  C)] 98.8  F (37.1  C)  Pulse:  [88] 88  Heart Rate:  [74-80] 74  Resp:  [20-21] 20  BP: (131-157)/(65-76) 157/76  SpO2:  [90 %-94 %] 90 %    Intake/Output Summary (Last 24 hours) at 05/22/18 1126  Last data filed at 05/21/18 2126   Gross per 24 hour   Intake              380 ml   Output              650 ml   Net             -270 ml     Admission Weight: 62.1 kg (137 lb)  Current Weight: 63.8 kg (140 lb 10.5 oz)    PHYSICAL EXAM  GENERAL: Patient is in no distress. Alert and oriented to self.  HEART: Regular rate and rhythm. S1S2. No murmurs  LUNGS: bilateral decreased breath sounds, no wheezing, no crackles.  ABDOMEN: Soft, no abdominal tenderness, bowel sounds heard   NEURO: moving all extremities.  EXTREMITIES: one plus ankle pedal edema. 2+ peripheral pulses.  SKIN: Warm, dry. No rash or bruising.  PSYCHIATRY Cooperative       Medications:          ampicillin-sulbactam (UNASYN) IV  3 g Intravenous Q6H     enoxaparin  40 mg Subcutaneous Q24H     hydrochlorothiazide  25 mg Oral Daily     levothyroxine  25 mcg Oral Daily     lisinopril  20 mg Oral Daily     simvastatin  10 mg Oral At Bedtime     acetaminophen, eucerin, guaiFENesin, hypromellose-dextran, melatonin, naloxone, ondansetron **OR** ondansetron, - MEDICATION INSTRUCTIONS -, potassium chloride, potassium chloride with lidocaine, potassium chloride, potassium chloride, potassium chloride, QUEtiapine, senna-docusate **OR** senna-docusate         Data:        Results for orders placed or performed during the hospital encounter of 05/17/18 (from  the past 24 hour(s))   Potassium   Result Value Ref Range    Potassium 4.1 3.4 - 5.3 mmol/L   Basic metabolic panel   Result Value Ref Range    Sodium 141 133 - 144 mmol/L    Potassium 3.9 3.4 - 5.3 mmol/L    Chloride 105 94 - 109 mmol/L    Carbon Dioxide 25 20 - 32 mmol/L    Anion Gap 11 3 - 14 mmol/L    Glucose 93 70 - 99 mg/dL    Urea Nitrogen 10 7 - 30 mg/dL    Creatinine 0.65 0.52 - 1.04 mg/dL    GFR Estimate 85 >60 mL/min/1.7m2    GFR Estimate If Black >90 >60 mL/min/1.7m2    Calcium 8.3 (L) 8.5 - 10.1 mg/dL   CBC with platelets   Result Value Ref Range    WBC 14.6 (H) 4.0 - 11.0 10e9/L    RBC Count 3.71 (L) 3.8 - 5.2 10e12/L    Hemoglobin 11.4 (L) 11.7 - 15.7 g/dL    Hematocrit 34.1 (L) 35.0 - 47.0 %    MCV 92 78 - 100 fl    MCH 30.7 26.5 - 33.0 pg    MCHC 33.4 31.5 - 36.5 g/dL    RDW 13.2 10.0 - 15.0 %    Platelet Count 291 150 - 450 10e9/L   Nt probnp inpatient   Result Value Ref Range    N-Terminal Pro BNP Inpatient 1931 (H) 0 - 1800 pg/mL

## 2018-05-22 NOTE — PLAN OF CARE
Problem: Patient Care Overview  Goal: Plan of Care/Patient Progress Review  PT: Attempted to see patient for PT session. Pt refused taking a walk or any exercises. Getting frustrated with PT the more pt was encouraged to try and do something.

## 2018-05-22 NOTE — PLAN OF CARE
Problem: Patient Care Overview  Goal: Plan of Care/Patient Progress Review  Outcome: No Change  VSS.  Pt denies pain/nausea.  Oriented to self only.  VPM in place, pt not always redirectable.  R PIV is WDL, SL.  Pt had loose, watery stool.  Voiding well.  Pt has +1 edema in dependent extremities.    Lung sounds diminished throughout, dry cough noted.  Pt up with A1, gait belt and walker.  Pt on dysphagia three diet.  Plan of care: continue to monitor.

## 2018-05-22 NOTE — PLAN OF CARE
Problem: Patient Care Overview  Goal: Plan of Care/Patient Progress Review  Discharge Planner OT   Patient plan for discharge: None stated  Current status:  Pt required step by step cues for safety with completing transfer and mobility to/from bathroom, sequencing of ADL task while standing at sink for grooming/hygiene task. SBA to Bruno for LE dressing  Barriers to return to prior living situation: Level of assist, pt lives alone, fall risk, cognition  Recommendations for discharge: TCU; family is concerned that pt confusion is situational, if pt returns home recommend 24 hour assist for all mobility and ADLs as well as home OT/PT per plan established by the Occupational Therapist  Rationale for recommendations: Pt would benefit from daily therapies to increase safety and IND in ADLs to return to PLOF, pt would benefit from TCU, however, if family prefers home to manage confusion and resume routine pt will require strict 24 hour assist for safety and home OT to increase IND       Entered by: Minerva Simpson 05/22/2018 9:01 AM

## 2018-05-22 NOTE — PROGRESS NOTES
PEEWEE MAHONEY) Patient is anticipated to D/C home on 5/23 with 24 hour care and [facility].  I) Updated HC via email of the updated discharge date.   Received a request to fax orders at D/C to [facility] at fax [phone].  P) Following.

## 2018-05-22 NOTE — PROVIDER NOTIFICATION
Patient has been assessed for Home Oxygen needs. Oxygen readings:    *Pulse oximetry (SpO2) = 92% on room air at rest while awake.    *SpO2 improved to 95% on 1liters/minute at rest.    *SpO2 = 88% on room air during activity/with exercise.    *SpO2 improved to 92% on 1liters/minute during activity/with exercise.

## 2018-05-23 ENCOUNTER — DOCUMENTATION ONLY (OUTPATIENT)
Dept: OTHER | Facility: CLINIC | Age: 83
End: 2018-05-23

## 2018-05-23 ENCOUNTER — APPOINTMENT (OUTPATIENT)
Dept: GENERAL RADIOLOGY | Facility: CLINIC | Age: 83
DRG: 177 | End: 2018-05-23
Attending: HOSPITALIST
Payer: MEDICARE

## 2018-05-23 VITALS
WEIGHT: 138.89 LBS | TEMPERATURE: 97.5 F | DIASTOLIC BLOOD PRESSURE: 57 MMHG | SYSTOLIC BLOOD PRESSURE: 148 MMHG | OXYGEN SATURATION: 91 % | BODY MASS INDEX: 25.56 KG/M2 | HEART RATE: 87 BPM | HEIGHT: 62 IN | RESPIRATION RATE: 20 BRPM

## 2018-05-23 LAB
ANION GAP SERPL CALCULATED.3IONS-SCNC: 11 MMOL/L (ref 3–14)
BACTERIA SPEC CULT: NO GROWTH
BACTERIA SPEC CULT: NO GROWTH
BUN SERPL-MCNC: 10 MG/DL (ref 7–30)
CALCIUM SERPL-MCNC: 7.9 MG/DL (ref 8.5–10.1)
CHLORIDE SERPL-SCNC: 101 MMOL/L (ref 94–109)
CO2 SERPL-SCNC: 28 MMOL/L (ref 20–32)
CREAT SERPL-MCNC: 0.7 MG/DL (ref 0.52–1.04)
ERYTHROCYTE [DISTWIDTH] IN BLOOD BY AUTOMATED COUNT: 13.2 % (ref 10–15)
GFR SERPL CREATININE-BSD FRML MDRD: 78 ML/MIN/1.7M2
GLUCOSE SERPL-MCNC: 90 MG/DL (ref 70–99)
HCT VFR BLD AUTO: 32 % (ref 35–47)
HGB BLD-MCNC: 10.7 G/DL (ref 11.7–15.7)
Lab: NORMAL
Lab: NORMAL
MCH RBC QN AUTO: 30.7 PG (ref 26.5–33)
MCHC RBC AUTO-ENTMCNC: 33.4 G/DL (ref 31.5–36.5)
MCV RBC AUTO: 92 FL (ref 78–100)
PLATELET # BLD AUTO: 291 10E9/L (ref 150–450)
POTASSIUM SERPL-SCNC: 3.5 MMOL/L (ref 3.4–5.3)
RBC # BLD AUTO: 3.49 10E12/L (ref 3.8–5.2)
SODIUM SERPL-SCNC: 140 MMOL/L (ref 133–144)
SPECIMEN SOURCE: NORMAL
SPECIMEN SOURCE: NORMAL
WBC # BLD AUTO: 12.6 10E9/L (ref 4–11)

## 2018-05-23 PROCEDURE — 25000132 ZZH RX MED GY IP 250 OP 250 PS 637: Performed by: INTERNAL MEDICINE

## 2018-05-23 PROCEDURE — 25000128 H RX IP 250 OP 636: Performed by: HOSPITALIST

## 2018-05-23 PROCEDURE — 25000128 H RX IP 250 OP 636: Performed by: INTERNAL MEDICINE

## 2018-05-23 PROCEDURE — A9270 NON-COVERED ITEM OR SERVICE: HCPCS | Mod: GY | Performed by: INTERNAL MEDICINE

## 2018-05-23 PROCEDURE — A9270 NON-COVERED ITEM OR SERVICE: HCPCS | Mod: GY | Performed by: PHYSICIAN ASSISTANT

## 2018-05-23 PROCEDURE — 36415 COLL VENOUS BLD VENIPUNCTURE: CPT | Performed by: INTERNAL MEDICINE

## 2018-05-23 PROCEDURE — 25000132 ZZH RX MED GY IP 250 OP 250 PS 637: Performed by: PHYSICIAN ASSISTANT

## 2018-05-23 PROCEDURE — 71046 X-RAY EXAM CHEST 2 VIEWS: CPT

## 2018-05-23 PROCEDURE — 80048 BASIC METABOLIC PNL TOTAL CA: CPT | Performed by: INTERNAL MEDICINE

## 2018-05-23 PROCEDURE — 99239 HOSP IP/OBS DSCHRG MGMT >30: CPT | Performed by: HOSPITALIST

## 2018-05-23 PROCEDURE — 85027 COMPLETE CBC AUTOMATED: CPT | Performed by: INTERNAL MEDICINE

## 2018-05-23 RX ADMIN — LISINOPRIL 20 MG: 20 TABLET ORAL at 09:17

## 2018-05-23 RX ADMIN — LEVOTHYROXINE SODIUM 25 MCG: 25 TABLET ORAL at 09:17

## 2018-05-23 RX ADMIN — FUROSEMIDE 20 MG: 10 INJECTION, SOLUTION INTRAMUSCULAR; INTRAVENOUS at 09:17

## 2018-05-23 RX ADMIN — AMPICILLIN SODIUM AND SULBACTAM SODIUM 3 G: 2; 1 INJECTION, POWDER, FOR SOLUTION INTRAMUSCULAR; INTRAVENOUS at 04:34

## 2018-05-23 NOTE — PROGRESS NOTES
[facility]  Met with patient and niece/POA Jayna to discuss plans for HC.  Pt to be discharged home today and has agreed to have [facility] follow with services of SN, PT, and Speech Therapy. Patient's niece states they do not want OT evaluation at home. Patient care support center processing referral.  Pt and family verbalized understanding that initial visit is scheduled for 5/24/18 or 5/25/18.  Patient's Niece Jayna has 24 hour phone number for [facility] for any questions or concerns.

## 2018-05-23 NOTE — PLAN OF CARE
Problem: Patient Care Overview  Goal: Plan of Care/Patient Progress Review  Outcome: Adequate for Discharge Date Met: 05/23/18  Discharge    Patient discharged to the [facility] apparent via own transportaion with POA, Chantale  Care plan note: vss, alert to self only. Needed frequent reminder, but easily redirectable. VPM in place, working properly. +BS, lungs diminished, IS used per encouragement. Needing 1LPM NC to maintain O2 sats >90%. Repeat CXR this am showed improvement. Ax1 with GB and WK. PIV removed per infiltration. Poor appetite. Rash on back still present, applied lotion, not worsened. Planned to d/c to home with POA whom had arranged for 24 hour supervision and Home O2. Pt is to started on Augmentin, Rx sent to pharmacy electronically.    Listed belongings gathered and returned to patient. Yes  Care Plan and Patient education resolved: Yes  Prescriptions if needed, hard copies sent with patient  No  Home and hospital acquired medications returned to patient: Yes  Medication Bin checked and emptied on discharge Yes  Follow up appointment made for patient: Yes

## 2018-05-23 NOTE — PLAN OF CARE
Problem: Patient Care Overview  Goal: Plan of Care/Patient Progress Review  Outcome: Improving  Patient alert and orientated to self only. Pt having low grade temps otherwise VSS on RA. LS coarse in LL, diminished in all other fields, infrequent, nonproductive cough. Denies SOB, some DON. Trace edema to BL legs and mild edema to BL ankles/feet. Pt DD3 with thin liquids diet, tolerating well. VPM in room, pt calm and redirectable most of shift, currently resting peacefully. Plan on discharge to home with 24 hour home care tomorrow.

## 2018-05-23 NOTE — PLAN OF CARE
Problem: Patient Care Overview  Goal: Plan of Care/Patient Progress Review  PT-  Pt discharged home prior to PT appt.  Pt will have home PT.  PT goals not met.

## 2018-05-23 NOTE — PROGRESS NOTES
P: DC today. Per previous SW note, orders faxed to [facility], [phone].    MARICHUY Beth, SW  [id number]

## 2018-05-23 NOTE — DISCHARGE SUMMARY
Discharge Summary  Hospitalist    Date of Admission:  5/17/2018  Date of Discharge:  5/23/2018  Discharging Provider: Chel Garcia MD    Primary Care Physician   Rajan Butt  Primary Care Provider Phone Number: [phone]  Primary Care Provider Fax Number: [phone]    PRINCIPAL DIAGNOSIS  Pneumonia, possibly community acquired versus aspiration  Acute hypoxemic respiratory insufficiency, improving on supplemental nasal oxygen.  Reactive leukocytosis from pneumonia.  Mild volume overload from fluid resuscitation - status post iv diuresis   Oropharyngeal dysphagia.    Back rash-erythema / contact dermatitis improved  Acute normocytic anemia: likely dilutional/acute illness  Acute metabolic encephalopathy/delirium, improving  Possible fall prior to admission history of dementia  Deconditioned from acute illness/chronic debility.    Past Medical History:   Diagnosis Date     ACP (advance care planning) 9-11-13    form given today     Dysequilibrium      Hypothyroid      Mixed hyperlipidemia     Hyperlipidemia     Poor short-term memory      Presbyacusis     Presbycusis     Unspecified essential hypertension     Essential hypertension       History of Present Illness   Lyla Duran is an 89 year old female who presented with confusion     Hospital Course    Lyla Duran is a 89 year old female with medical history of HTN, HLD, hearing loss, cognitive deficits, hypothyroidism admitted on 5/17/18 with confusion and possible fall prior to admission.     Pneumonia, possibly community acquired versus aspiration  Acute hypoxemic respiratory insufficiency, improving  Reactive leukocytosis from pneumonia.  Admission CXR 5/17 with RLL and RML consolidation  Repeat imaging 5/18 and 5/20 without significant change. Chest x-ray on 5/23 at the time of discharge showed persistent small bilateral pleural effusion, air space opacity in the right lower lobe has improved but not completely resolved.  WBC minimal  improvement 16k to 12k, Pro calcitonin 0.18 suggestive of a slowly improving pneumonia. Has been requiring 1 L of nasal oxygen.  Bcx no growth, no sputum samples due to no significant sputum  Ceftriaxone (5/17 - 5/20) switched to Unasyn (5/21 - 5/23)for improved aspiration PNA coverage.  De-escalate to oral Augmentin for four days to complete a total 10 day course of antibiotics.  Discontinue Azithromycin 500mg IV (5/18 - 5/22)  Wean nasal oxygen as tolerated, goal oxygen saturation's greater than 90% - discharged home on supplemental nasal oxygen.  Aggressive incentive spirometry/a cappella.  Ambulate out of bed to chair.  Recommend repeat imaging of the chest in 4 to 6 weeks for resolution of symptoms.     Mild volume overload from fluid resuscitation.   Patient has no shortness of breath, having oxygen saturation's drop to high 80s on minimal ambulation.  Oxygen saturation's between 90 to 95% on 1-2 L nasal oxygen.  Has received Diuresis with 20 mg IV Lasix  [5/22-5/23]. Plan to discharge on home dose of hydrochlorothiazide.  Limb elevation, compression stockings  wean nasal oxygen as tolerated.     Oropharyngeal dysphagia.   As per patient's daughter, patient's swallowing has been declining, has food particles in her mouth intermittently.  Speech therapy assessment appreciated, recommended to downgrade to dysphagia three diet with thin liquids.   Sit up in chair for all means, strict aspiration precautions.  Follow-up that speech therapy outpatient as needed     Erythematous rash went back improving.  Noted scattered maculopapular rash over back but not on flank neck, arms--does not appear to be antibiotic related and possibly related to pressure laying in bed  continue to monitor, if worsening discuss with provider, eucerin PRN     Acute normocytic anemia: likely dilutional/acute illness  Baseline hemoglobin around 13.0.  Currently ~10-11, likely related to acute infection.  No blood loss during  hospitalization     Acute metabolic encephalopathy/delirium, improving  Possible fall prior to admission history of dementia  Patient was noted with a couple of bruises on her legs and family wondered if she had a fall prior to admission.  Patient has baseline short term memory issues, and family have been providing patient with written notes to keep patient oriented at the hospital.   Noted confusion on admission which improved with antibiotic treatment; per family patient is almost near baseline.  CT of the head on admission showed no acute intracranial abnormality.  Frequent reorientation.  Minimize interruptions.  PRN Seroquel.     Hypertension:   Continue PTA lisinopril   resumed PTA hydrochlorothiazide at the time of discharge      Deconditioned from acute illness/chronic debility.  PT/OT recommend TCU  patient's daughter, sister would like to take her home with 24 into 7 services    # Discharge Pain Plan:   - Patient currently has NO PAIN and is not being prescribed pain medications on discharge.  Patient, her daughter Mary, interdisciplinary team involved in care and agree with discharge plan.    Chel Garcia MD    Pending Results   Unresulted Labs Ordered in the Past 30 Days of this Admission     No orders found from 3/18/2018 to 5/18/2018.             Physical Exam   Vitals:    05/21/18 0642 05/22/18 0622 05/23/18 0613   Weight: 63.1 kg (139 lb 1.8 oz) 63.8 kg (140 lb 10.5 oz) 63 kg (138 lb 14.2 oz)     Vital Signs with Ranges  Temp:  [97.5  F (36.4  C)-99.6  F (37.6  C)] 97.5  F (36.4  C)  Pulse:  [87] 87  Heart Rate:  [58-83] 58  Resp:  [17-20] 20  BP: (139-148)/(57-69) 148/57  SpO2:  [87 %-93 %] 91 %  I/O last 3 completed shifts:  In: -   Out: 550 [Urine:550]  PHYSICAL EXAM  GENERAL: Patient is in no distress. Alert and oriented to self.  HEART: Regular rate and rhythm. S1S2. No murmurs  LUNGS: bilateral decreased breath sounds, no wheezing, no crackles.  ABDOMEN: Soft, no abdominal tenderness,  bowel sounds heard   NEURO: moving all extremities.  EXTREMITIES: trace ankle edema. 2+ peripheral pulses.  SKIN: Warm, dry. No rash or bruising.  PSYCHIATRY Cooperative  )Consultations This Hospital Stay   SPEECH LANGUAGE PATH ADULT IP CONSULT  PHYSICAL THERAPY ADULT IP CONSULT  PHYSICAL THERAPY ADULT IP CONSULT  OCCUPATIONAL THERAPY ADULT IP CONSULT  SOCIAL WORK IP CONSULT    Time Spent on this Encounter   I, Luis Fernandoamber [place], personally saw the patient today and spent greater than 30 minutes discharging this patient.  More than 50% of time spent in direct patient care, care coordination, patient/caregiver counseling, and formalizing plan of care.    Discharge Orders     Home Care PT Referral for Hospital Discharge     Home Care OT Referral for Hospital Discharge     Home Care SLP Referral for Hospital Discharge     Follow-up and recommended labs and tests    Follow up with primary care provider, Rajan Butt, within 7 days for hospital follow- up.  Dr Butt does not have any availability, so you have been scheduled to see  AUSTEN Estrada CNP, that works with Dr Butt, on Friday 5/25/18 at 11:30am.     Follow-up and recommended labs and tests    Follow up with primary care provider, Rajan Butt, within 7 days for hospital follow- up.  The following labs/tests are recommended: WBC count if symptoms not improving.     Activity   Your activity upon discharge: activity as tolerated with assitance  24 X 7 Supervision     Monitor and record   blood pressure 2 -3 times a week and review on provider visit     When to contact your care team   Call your primary doctor if you have any of the following:  increased shortness of breath, confusion or fever.     Wound care and dressings   Instructions to care for your wound at home: as directed - to prevent pressure injury on buttock.     Reason for your hospital stay   You were admitted with confusion and pneumonia.    Pneumonia, possibly community  acquired versus aspiration  Acute hypoxemic respiratory insufficiency, improving on supplemental nasal oxygen.  Reactive leukocytosis from pneumonia.  Mild volume overload from fluid resuscitation - status post iv diuresis   Oropharyngeal dysphagia.    Back rash-erythema / contact dermatitis improved  Acute normocytic anemia: likely dilutional/acute illness  Acute metabolic encephalopathy/delirium, improving  Possible fall prior to admission history of dementia  Deconditioned from acute illness/chronic debility.     Discharge Instructions   Wean off supplemental nasal oxygen as tolerated, goal oxygen saturation greater than 90%.  Aggressive incentive spirometry/a cappella.  Feeding with strict aspiration precautions, follow-up with speech therapy as outpatient.  Frequent reorientation, minimize interruptions.  Recommend repeat imaging of the chest in 4 to 6 weeks for resolution of symptoms.  Limb elevation/compression stocking  Discuss long-term goals of care on PCP visit.  Home health for two weeks go home PT/OT/speech therapy given deconditioning.     DNR (Do Not Resuscitate)     Oxygen Adult   [place] Oxygen Order 2 liter(s) by nasal cannula continuously with use of portable tank. Expected treatment length is 2 weeks.  Test on conserving device as applicable.      Diagnosis   Pneumonia, possibly community acquired versus aspiration  Acute hypoxemic respiratory insufficiency, improving on supplemental nasal oxygen.  Reactive leukocytosis from pneumonia.  Mild volume overload from fluid resuscitation - status post iv diuresis   Oropharyngeal dysphagia.      Patients who qualify for home O2 coverage under the CMS guidelines require ABG tests or O2 sat readings obtained closest to, but no earlier than 2 days prior to the discharge, as evidence of the need for home oxygen therapy. Testing must be performed while patient is in the chronic stable state. See notes for O2 sats.    I certify that this patient, Lyla CORNELIUS  Renee has been under my care and that I, or a nurse practitioner or physician's assistant working with me, had a face-to-face encounter that meets the face-to-face encounter requirements with this patient on 5/23/2018. The patient, Lyla Duran was evaluated or treated in whole, or in part, for the following medical condition, which necessitates the use of the ordered oxygen. Treatment Diagnosis:     Attending Provider: Chel Garcia  Physician signature: See electronic signature associated with these discharge orders  Date of Order: May 23, 2018     Diet   Follow this diet upon discharge: Orders Placed This Encounter     Room Service     Combination Diet Dysphagia Diet Level 3: Advanced; Thin Liquids (water, ice chips, juice, milk gelatin, ice cream, etc)         Discharge Medications   Current Discharge Medication List      START taking these medications    Details   amoxicillin-clavulanate (AUGMENTIN) 875-125 MG per tablet Take 1 tablet by mouth 2 times daily for 4 days  Qty: 8 tablet, Refills: 0    Associated Diagnoses: Pneumonia of right lower lobe due to infectious organism (H)         CONTINUE these medications which have NOT CHANGED    Details   hydrochlorothiazide (HYDRODIURIL) 25 MG tablet TAKE ONE (1) TABLET (25 mg) BY mouth every morning  Qty: 90 tablet, Refills: 1    Associated Diagnoses: Encounter for medication refill      levothyroxine (SYNTHROID/LEVOTHROID) 25 MCG tablet TAKE ONE (1) TABLET BY MOUTH DAILY ...THANK YOU...  Qty: 90 tablet, Refills: 3    Associated Diagnoses: Encounter for medication refill      lisinopril (PRINIVIL/ZESTRIL) 20 MG tablet TAKE ONE (1) TABLET BY MOUTH DAILY ...THANK YOU...  Qty: 90 tablet, Refills: 3    Associated Diagnoses: Encounter for medication refill      lovastatin (MEVACOR) 20 MG tablet TAKE ONE (1) TABLET BY MOUTH AT BEDTIME  Qty: 90 tablet, Refills: 3    Associated Diagnoses: Encounter for medication refill; Mixed hyperlipidemia           Allergies   No  Known Allergies    Discharge Disposition   Discharged to home  Condition at discharge: Fair    DATA  Most Recent 3 CBC's:  Recent Labs   Lab Test  05/23/18   0740  05/22/18   0900  05/21/18   0750   WBC  12.6*  14.6*  13.9*   HGB  10.7*  11.4*  10.5*   MCV  92  92  91   PLT  291  291  236      Most Recent 3 BMP's:  Recent Labs   Lab Test  05/23/18   0740  05/22/18   0900  05/21/18   1510  05/21/18   0750   NA  140  141   --   138   POTASSIUM  3.5  3.9  4.1  3.3*   CHLORIDE  101  105   --   103   CO2  28  25   --   28   BUN  10  10   --   11   CR  0.70  0.65   --   0.54   ANIONGAP  11  11   --   7   TATYANA  7.9*  8.3*   --   8.1*   GLC  90  93   --   98     Most Recent 2 LFT's:  Recent Labs   Lab Test  02/01/16   1355  05/30/12   1134   AST  15  16   ALT  18  12   ALKPHOS  80  61   BILITOTAL  0.7  0.8     Most Recent 6 Bacteria Isolates From Any Culture (See EPIC Reports for Culture Details):  Recent Labs   Lab Test  05/17/18   1603  05/17/18   1521   CULT  No growth  No growth     Results for orders placed or performed during the hospital encounter of 05/17/18   Chest XR,  PA & LAT    Narrative    XR CHEST 2 VW 5/17/2018 3:46 PM    COMPARISON: 2/1/2016    HISTORY: Cough, concern for pneumonia.      Impression    IMPRESSION: Consolidation in the right base and right midlung,  suggesting pneumonia. Left lung is clear. No pleural effusion or  pneumothorax seen on either side.    DEVEN BOLIVAR MD   Head CT w/o contrast    Narrative    CT SCAN OF THE HEAD WITHOUT CONTRAST   5/17/2018 3:38 PM     HISTORY: Check for traumatic bleed, confused and possible recent fall.      TECHNIQUE:  Axial images of the head and coronal reformations without  IV contrast material. Radiation dose for this scan was reduced using  automated exposure control, adjustment of the mA and/or kV according  to patient size, or iterative reconstruction technique.    COMPARISON: 2/1/2016.    FINDINGS:  There is generalized atrophy of the brain.  There is  low  attenuation in the white matter of the cerebral hemispheres consistent  with sequelae of small vessel ischemic disease. There is no evidence  of intracranial hemorrhage, mass, acute infarct or anomaly.     The visualized portions of the sinuses and mastoids appear normal.  There is no evidence of trauma.      Impression    IMPRESSION:   1. No acute abnormality.  2. Atrophy of the brain.  White matter changes consistent with  sequelae of small vessel ischemic disease. This is unchanged.    LONNY MARCOS MD   XR Chest Port 1 View    Narrative    CHEST ONE VIEW UPRIGHT 5/18/2018 4:25 PM     HISTORY: Shortness of breath.    COMPARISON: May 17, 2018      Impression    IMPRESSION: Slight decrease in right-sided infiltrate since the  comparison study. Left lung remains clear.    JEFF BASS MD   XR Chest 2 Views    Narrative    CHEST TWO VIEWS 5/21/2018 8:07 AM     HISTORY: Pneumonia, tachypnea.     COMPARISON: 5/18/2018.    FINDINGS: There is worsening airspace opacity in the right lower and  right middle lobes. There are bilateral pleural effusions. Hazy  opacity at the left lung base has also worsened compared to the prior  study. No pneumothorax. Mediastinal contours and heart size stable.       Impression    IMPRESSION: Worsening bibasilar pneumonia associated with bilateral  pleural effusions.     VALARIE MANSFIELD MD   XR Chest 2 Views    Narrative    CHEST TWO VIEWS 5/23/2018 9:57 AM     HISTORY: Pneumonia, hypoxia.     COMPARISON: 5/21/2018    FINDINGS: There are persistent small bilateral pleural effusions.  Airspace opacity in the right lower lobe has improved but not  completely resolved. Heart size is stable. No pneumothorax.       Impression    IMPRESSION: Improving right lower lobe aeration.     VALARIE MANSFIELD MD

## 2018-05-23 NOTE — PLAN OF CARE
Problem: Patient Care Overview  Goal: Plan of Care/Patient Progress Review  OT: pt has discharged to home with homecare PT, SLP and nursing, family declined need for OT, GOALS NOT MET, see discharge summary

## 2018-05-23 NOTE — PLAN OF CARE
Problem: Patient Care Overview  Goal: Plan of Care/Patient Progress Review  Outcome: Adequate for Discharge Date Met: 05/23/18  Speech Language Therapy Discharge Summary    Reason for therapy discharge:    Discharged to home with home therapy.    Progress towards therapy goal(s). See goals on Care Plan in [facility] electronic health record for goal details.  Goals not met.  Barriers to achieving goals:   discharge from facility.    Therapy recommendation(s):    Continued therapy is recommended.  Rationale/Recommendations:  Continue ST for diet tolerance and adjustment due to declining swallow function. Discharged on a DDL 3 with thin liquids. Educate on slecting soft minimal chewing foods. .

## 2018-05-23 NOTE — PROGRESS NOTES
Patient has been assessed for Home Oxygen needs. Oxygen readings:    *Pulse oximetry (SpO2) = 87% on room air at rest while awake.    *SpO2 improved to 93% on 2liters/minute at rest.    *SpO2 = 87% on room air during activity/with exercise.    *SpO2 improved to 93% on 2liters/minute during activity/with exercise.

## 2018-05-23 NOTE — PLAN OF CARE
Problem: Patient Care Overview  Goal: Plan of Care/Patient Progress Review  Outcome: No Change  A&O to self only. Oxygen saturation 87% on room air. Mild DON. Commenced 2L O2 via nasal canula to keep O2 above 92%. Other VSS on room air. IV SL. Calm and coorporative. Easily re-directed. VPM on at bedside. Denied pain. Up assist of one gait belt and walker to bathroom. Voids but also has some incontinence. For possible discharge today. Will continue to monitor.

## 2018-05-23 NOTE — PROGRESS NOTES
Received intake call for home oxygen at 10:35AM. Reviewed patient's chart; the current diagnoses on order will not qualify for O2 coverage by Medicare.   10:45AM- Called and spoke to RTRiana, to look for any possible diagnoses in patient's chart that may work.   11:16AM- Spoke to care coordinator, Fabio, and explained the situation. She is going to talk to the patient's niece, Chantale, and see if they are comfortable to pay OOP for the rental of the POC.  11:21AM- Attempted to call nieceChantale, to offer choice and talk through equipment.  12:02 PM- Spoke to Jayna and offered choice. Discussed equipment and informed her that I would be at bedside within a half hour.   12:22PM- At bedside with POC.

## 2018-05-24 ENCOUNTER — PATIENT OUTREACH (OUTPATIENT)
Dept: CARE COORDINATION | Facility: CLINIC | Age: 83
End: 2018-05-24

## 2018-05-24 PROCEDURE — G0180 MD CERTIFICATION HHA PATIENT: HCPCS | Performed by: FAMILY MEDICINE

## 2018-05-24 NOTE — LETTER
[place] CARE COORDINATION  [facility]       May 24, 2018    Lyla Duran  [address]  [address] [place] [address]      Dear Lyla,    I am a clinic care coordinator who works with Rajan Butt MD at [facility]. I wanted to thank you for spending the time to talk with me.  I wanted to provide you with my contact information so that you can call me with questions or concerns about your health care. Below is a description of clinic care coordination and how I can further assist you.     The clinic care coordinator is a registered nurse and/or  who understand the health care system. The goal of clinic care coordination is to help you manage your health and improve access to the [facility] system in the most efficient manner. The registered nurse can assist you in meeting your health care goals by providing education, coordinating services, and strengthening the communication among your providers. The  can assist you with financial, behavioral, psychosocial, chemical dependency, counseling, and/or psychiatric resources.    Please feel free to contact me at [phone], with any questions or concerns. We at [facility] are focused on providing you with the highest-quality healthcare experience possible and that all starts with you.     Sincerely,     Sheila Muro

## 2018-05-24 NOTE — PROGRESS NOTES
"Clinic Care Coordination Contact    Clinic Care Coordination Contact  OUTREACH    Referral Information:  Referral Source: IP Handoff    Primary Diagnosis: Pneumonia    Chief Complaint   Patient presents with     Clinic Care Coordination - Post Hospital     Discharged home with 24/7 care        [place] Utilization:   Clinic Utilization: Follow up with PCP on 5/25/18  Difficulty keeping appointments:: No  Utilization    Last refreshed: 5/24/2018  9:10 AM:  No Show Count (past year) 0       Last refreshed: 5/24/2018  9:10 AM:  ED visits 0       Last refreshed: 5/24/2018  9:10 AM:  Hospital admissions 1          Current as of: 5/24/2018  9:10 AM             Clinical Concerns:  Current Medical Concerns: Patient was recently hospitalized from 5/17-5/23. Patient presented to the ED for confusion where it was found that the patient had pneumonia. Patient was treated with IV antibiotics and discharged on supplemental oxygen. Patient was also found to have some dysphagia. Patient was seen by speech and downgraded to dysphagia 3 with thin liquid diet. Patient was seen by therapy due to physical deconditioning where a TCU was recommended but family wanted to take the patient home with 24/7 care. Spoke with the patient POA today, Mary who stated the patient was doing well last night. Patient was happy to be home. Mary received a call from the caregiver last night that stated the patient was upset and screaming. Mary upset and stated \"thats not Lyla.\" Mary was interesting in getting new resources for caregiver company's. RN CC sent via email her resources. Mary wanted to see how the patient was doing. RN CC will touch base later this afternoon to address any medical concerns.   Current Behavioral Concerns: Patient presented to the ED with some confusion. Per family the patient got home and was more alert and orientated. In the evening the patient did display some confusion and was yelling out. Family was going to see " how she was doing today.     Education Provided to patient: Care Coordination services. Caregiver resources.    Pain  Chronic pain (GOAL):: No  Health Maintenance Reviewed: Due/Overdue   Clinical Pathway: Clinic Care Coordination Pneumonia Assessment  Discharge:    Day of hospital discharge: 5/17/18  What recommendations were made for follow up after your recent hospitalization?   Wean off supplemental nasal oxygen as tolerated, goal oxygen saturation greater than 90%.  Aggressive incentive spirometry/a cappella.  Feeding with strict aspiration precautions, follow-up with speech therapy as outpatient.  Frequent reorientation, minimize interruptions.  Recommend repeat imaging of the chest in 4 to 6 weeks for resolution of symptoms.  Limb elevation/compression stocking  Discuss long-term goals of care on PCP visit.  Home health for two weeks go home PT/OT/speech therapy given deconditioning.        Have the follow up appointments been scheduled? Yes, 5/25/18.     Transportation concerns (GOAL):: No    Medications:     Were you started on any new medications?  Yes, amoxicillin-clavulanate BID for 4 days.     Were any of your previous medications changed? No    Do you have all of your medications? Yes     Have you had trouble filling your prescriptions? No    Are your medications effective in controlling your symptoms? Yes      Medication reconciliation completed? Yes  Oxygen/DME    Are you currently on oxygen? Yes     Is this new for you? Yes     Is it set up at home? Yes     What liter flow were you instructed to use and how often do you use it? 2L   Follow Up Plan:    Care Coordinator follow up plan: Place phone call later this afternoon or touch base in clinic on 5/25/18.       Medication Management:  Family and caregiver help with medication management. Unable to conduct medication review with family. Will try again this afternoon or tomorrow in clinic. No concerns at this time.     Functional Status:  Dependent  ADLs:: Ambulation-walker  Bed or wheelchair confined:: No  Mobility Status: Dependent/Assisted by Another    Living Situation:  Current living arrangement:: I live alone (Pt currently as 24/7 Aide Services)  Type of residence:: Apartment    Diet/Exercise/Sleep:       Transportation:  Transportation concerns (GOAL):: No  Transportation means:: Regular car, Family     Psychosocial:  Sabianism or spiritual beliefs that impact treatment:: No  Mental health DX:: No  Mental health management concern (GOAL):: No  Informal Support system:: Family     Financial/Insurance: Medicare Insurance. No financial concerns at this time.   Financial/Insurance concerns (GOAL):: No     Resources and Interventions:  Current Resources: Home Care.   List of home care services:: Physicial Therapy, Speech Therapy, Occupational Therapy;   Community Resources: PCA  Supplies used at home:: Oxygen Tubing/Supplies  Equipment Currently Used at Home: walker, rolling, oxygen         Referrals Placed: Other (Caregiver Resources Sent to POA)     Goals:   Goals        General    Functional (pt-stated)     Notes - Note created  5/24/2018 10:05 AM by Sheila Muro RN    Goal Statement: I want to look into getting a new 24/7 caregiver for my aunt.   Measure of Success: POA will look over resources sent by RN CC. RN CC will follow up on if the family called new company's.   Supportive Steps to Achieve: RN CC emailed out resources to POA.   Barriers: Time.   Strengths: Family motivated to change. Open to suggestions.   Date to Achieve By: June 1, 2018                   Patient/Caregiver understanding: Family understood next steps. Family provided with RN CC direct number. Family will call if needed.     Outreach Frequency: monthly  Future Appointments              Tomorrow Jaja Cartagena APRN CNP [facility][note 3]    In 3 months Rajan Butt MD [facility][note 3]          Plan:   1. RN CC sent  caregiver resources to the family via email.   2. RN CC will touch base with family this afternoon or in clinic tomorrow on 5/25/18.   3. Family will look over resources to see if a company will work.   RN CC will reach out again in 1 month to assess needs or concerns.     Clary Salas RN  Clinic Care Coordinator  [phone]  [email or web address]

## 2018-05-25 ENCOUNTER — OFFICE VISIT (OUTPATIENT)
Dept: FAMILY MEDICINE | Facility: CLINIC | Age: 83
End: 2018-05-25

## 2018-05-25 VITALS
SYSTOLIC BLOOD PRESSURE: 118 MMHG | HEART RATE: 78 BPM | OXYGEN SATURATION: 93 % | BODY MASS INDEX: 24.69 KG/M2 | DIASTOLIC BLOOD PRESSURE: 68 MMHG | WEIGHT: 135 LBS

## 2018-05-25 DIAGNOSIS — Z09 HOSPITAL DISCHARGE FOLLOW-UP: Primary | ICD-10-CM

## 2018-05-25 DIAGNOSIS — R41.0 INTERMITTENT CONFUSION: ICD-10-CM

## 2018-05-25 LAB
BACTERIA URINE: NORMAL
BILIRUB UR QL STRIP: NORMAL
BLOOD URINE DIP: NORMAL
CASTS/LPF: NORMAL
COLOR UR: YELLOW
CRYSTAL URINE: NORMAL
EPITHELIAL CELLS - QUEST: NORMAL
GLUCOSE UR STRIP-MCNC: NORMAL MG/DL
KETONES UR QL STRIP: NORMAL
LEUKOCYTE ESTERASE URINE DIP: NORMAL
MUCOUS URINE: NORMAL
NITRITE UR QL STRIP: NORMAL
PH UR STRIP: 8.5 PH (ref 5–9)
PROT UR QL: NORMAL MG/DL (ref ?–0.01)
RBC URINE: NORMAL (ref 0–3)
SP GR UR STRIP: 1.01 (ref 1–1.02)
UROBILINOGEN UR QL STRIP: 0.2 EU/DL (ref 0.2–1)
WBC URINE: NORMAL (ref 0–3)

## 2018-05-25 PROCEDURE — 81003 URINALYSIS AUTO W/O SCOPE: CPT | Performed by: NURSE PRACTITIONER

## 2018-05-25 PROCEDURE — 99213 OFFICE O/P EST LOW 20 MIN: CPT | Performed by: NURSE PRACTITIONER

## 2018-05-25 NOTE — MR AVS SNAPSHOT
After Visit Summary   5/25/2018    Lyla Duran    MRN: [id number]           Patient Information     Date Of Birth          3/3/1925        Visit Information        Provider Department      5/25/2018 11:30 AM Jaja Cartagena APRN CNP [facility]        Today's Diagnoses     Hospital discharge follow-up    -  1    Intermittent confusion           Follow-ups after your visit        Follow-up notes from your care team     Return in about 4 weeks (around 6/22/2018) for chest xray.      Your next 10 appointments already scheduled     Sep 14, 2018  9:00 AM CDT   PHYSICAL with Rajan Butt MD   [facility]    [address] [place] [address] [phone]              Who to contact     If you have questions or need follow up information about today's clinic visit or your schedule please contact [facility] directly at [phone].  Normal or non-critical lab and imaging results will be communicated to you by MyChart, letter or phone within 4 business days after the clinic has received the results. If you do not hear from us within 7 days, please contact the clinic through [employer] or phone. If you have a critical or abnormal lab result, we will notify you by phone as soon as possible.  Submit refill requests through [employer] or call your pharmacy and they will forward the refill request to us. Please allow 3 business days for your refill to be completed.          Additional Information About Your Visit        [employer] Information     [employer] gives you secure access to your electronic health record. If you see a primary care provider, you can also send messages to your care team and make appointments. If you have questions, please call your primary care clinic.  If you do not have a primary care provider, please call [phone] and they will assist you.        Care EveryWhere ID     This is your Care EveryWhere ID. This  could be used by other organizations to access your [place] medical records  [id number]        Your Vitals Were     Pulse Pulse Oximetry BMI (Body Mass Index)             78 93% 24.69 kg/m2          Blood Pressure from Last 3 Encounters:   05/25/18 118/68   05/23/18 148/57   09/12/17 122/72    Weight from Last 3 Encounters:   05/25/18 61.2 kg (135 lb)   05/23/18 63 kg (138 lb 14.2 oz)   09/12/17 62.4 kg (137 lb 9.6 oz)              We Performed the Following     Urinalysis w/reflex protein, bili ([facility])          Today's Medication Changes          These changes are accurate as of 5/25/18 12:05 PM.  If you have any questions, ask your nurse or doctor.               These medicines have changed or have updated prescriptions.        Dose/Directions    lovastatin 20 MG tablet   Commonly known as:  MEVACOR   This may have changed:  See the new instructions.   Used for:  Encounter for medication refill, Mixed hyperlipidemia        TAKE ONE (1) TABLET BY MOUTH AT BEDTIME   Quantity:  90 tablet   Refills:  3                Primary Care Provider Office Phone # Fax #    Rajan Deonte Butt [phone] [address] [place] [address]        Goals        General    Functional (pt-stated)     Notes - Note created  5/24/2018 10:05 AM by Sheila Muro, RN    Goal Statement: I want to look into getting a new 24/7 caregiver for my aunt.   Measure of Success: POA will look over resources sent by RN CC. RN CC will follow up on if the family called new company's.   Supportive Steps to Achieve: RN CC emailed out resources to POA.   Barriers: Time.   Strengths: Family motivated to change. Open to suggestions.   Date to Achieve By: June 1, 2018             Equal Access to Services     SOFIA COLEMAN AH: Primo Santoro, jesus garcia, navneet edmonds, kassie willoughby. So [facility] [phone].    ATENCIÓN: Si habla español, tiene a eckert disposición servicios  manisha de asistencia lingüística. Kandi smyth [phone].    We comply with applicable federal civil rights laws and [place] laws. We do not discriminate on the basis of race, color, national origin, age, disability, sex, sexual orientation, or gender identity.            Thank you!     Thank you for choosing [facility]  for your care. Our goal is always to provide you with excellent care. Hearing back from our patients is one way we can continue to improve our services. Please take a few minutes to complete the written survey that you may receive in the mail after your visit with us. Thank you!             Your Updated Medication List - Protect others around you: Learn how to safely use, store and throw away your medicines at www.disposemymeds.org.          This list is accurate as of 5/25/18 12:05 PM.  Always use your most recent med list.                   Brand Name Dispense Instructions for use Diagnosis    amoxicillin-clavulanate 875-125 MG per tablet    AUGMENTIN    8 tablet    Take 1 tablet by mouth 2 times daily for 4 days    Pneumonia of right lower lobe due to infectious organism (H)       hydrochlorothiazide 25 MG tablet    HYDRODIURIL    90 tablet    TAKE ONE (1) TABLET (25 mg) BY mouth every morning    Encounter for medication refill       levothyroxine 25 MCG tablet    SYNTHROID/LEVOTHROID    90 tablet    TAKE ONE (1) TABLET BY MOUTH DAILY ...THANK YOU...    Encounter for medication refill       lisinopril 20 MG tablet    PRINIVIL/ZESTRIL    90 tablet    TAKE ONE (1) TABLET BY MOUTH DAILY ...THANK YOU...    Encounter for medication refill       lovastatin 20 MG tablet    MEVACOR    90 tablet    TAKE ONE (1) TABLET BY MOUTH AT BEDTIME    Encounter for medication refill, Mixed hyperlipidemia

## 2018-05-25 NOTE — PROGRESS NOTES
SUBJECTIVE:                                                      Patient presents for Hospital Followup Visit:    Hospital:  [facility]      Date of Admission: 5/17/18  Date of Discharge: 5/23/18  Reason(s) for Admission: Pneumonia            Problems taking medications regularly:  None       Medication changes since discharge: amoxicillin BID       Problems adhering to non-medication therapy:  None  Some confusion since discharge, improving slowly, new caregiver at home since discharge for 2 weeks for PT/OT/speech therapy for deconditioning. . Amoxicillin for 4 days after discharge. Brought urine in to test for UTI.    Summary of hospitalization:  [facility] discharge summary reviewed  Diagnostic Tests/Treatments reviewed.  Follow up needed: 4 weeks for chest xray  Other Healthcare Providers Involved in Patient s Care:         Homecare  Update since discharge: some confusion     ROS:  Constitutional, HEENT, cardiovascular, pulmonary, gi and gu systems are negative, except as otherwise noted.    OBJECTIVE:                                                      GENERAL APPEARANCE: healthy, alert and no distress  EYES: Eyes grossly normal to inspection, PERRL and conjunctivae and sclerae normal  HENT: ear canals and TM's normal and nose and mouth without ulcers or lesions  NECK: no adenopathy, no asymmetry, masses, or scars and thyroid normal to palpation  RESP: lungs clear to auscultation - no rales, rhonchi or wheezes  CV: regular rates and rhythm, normal S1 S2, no S3 or S4 and no murmur, click or rub  ABDOMEN: soft, nontender, without hepatosplenomegaly or masses and bowel sounds normal    ASSESSMENT/PLAN:                                                      1. Hospital discharge follow-up  Cont to monitor confusion, continue Augmentin    2. Intermittent confusion  - Urinalysis w/reflex protein, bili ([facility])  Drink lots of fluids  Go to ER or clinic if confusion does not improve or  worsens    Post Discharge Medication Reconciliation: discharge medications reconciled, continue medications without change.  Issues to address: Issues identified were:   confusion since discharge, cont to minitor.  Plan of care communicated with patient and family      Type of Medical Decision Making Face-to-Face Visit within 7 Days Face-to-Face Visit within 14 days   Moderate Complexity [id number]   High Complexity [id number]

## 2018-05-29 ENCOUNTER — MEDICAL CORRESPONDENCE (OUTPATIENT)
Dept: FAMILY MEDICINE | Facility: CLINIC | Age: 83
End: 2018-05-29

## 2018-06-01 ENCOUNTER — MEDICAL CORRESPONDENCE (OUTPATIENT)
Dept: FAMILY MEDICINE | Facility: CLINIC | Age: 83
End: 2018-06-01

## 2018-06-04 ENCOUNTER — MEDICAL CORRESPONDENCE (OUTPATIENT)
Dept: FAMILY MEDICINE | Facility: CLINIC | Age: 83
End: 2018-06-04

## 2018-06-25 ENCOUNTER — MEDICAL CORRESPONDENCE (OUTPATIENT)
Dept: FAMILY MEDICINE | Facility: CLINIC | Age: 83
End: 2018-06-25

## 2018-08-13 ENCOUNTER — PATIENT OUTREACH (OUTPATIENT)
Dept: CARE COORDINATION | Facility: CLINIC | Age: 83
End: 2018-08-13

## 2018-08-13 NOTE — PROGRESS NOTES
Clinic Care Coordination Contact  [facility]/Voicemail     Referral: Care Team    Clinical Data: Care Coordinator Outreach  Outreach attempted x 1.  Left message on voicemail with call back information and requested return call.  Plan: Care Coordinator will try to reach patient again in 3-5 business days.    Clary Salsa RN  Clinic Care Coordinator  [phone]  [email or web address]

## 2018-08-13 NOTE — LETTER
[facility] CARE COORDINATION    August 16, 2018    Lyla Duran  [address]  [address] [place] [address]      Dear Lyla,    I am a clinic care coordinator who works with Rajan Butt MD at [facility]. I recently tried to call and was unable to reach you. I wanted to encourage you to call the clinic to speak with a Care Coordinator if any questions or concerns arise regarding Lyla's health or care. I will be transitioning out of the clinic starting September 7th but my coworkers will be happy to assist you.  Below is a description of clinic care coordination and how we can further assist you.     The clinic care coordinator is a registered nurse and/or  who understand the health care system. The goal of clinic care coordination is to help you manage your health and improve access to the [facility] system in the most efficient manner. The registered nurse can assist you in meeting your health care goals by providing education, coordinating services, and strengthening the communication among your providers. The  can assist you with financial, behavioral, psychosocial, chemical dependency, counseling, and/or psychiatric resources.    Please contact the clinic at [phone], with any questions or concerns. We at [facility] are focused on providing you with the highest-quality healthcare experience possible and that all starts with you.     Sincerely,     Sheila Muro

## 2018-08-16 NOTE — PROGRESS NOTES
Clinic Care Coordination Contact  [facility]/Voicemail       Clinical Data: Care Coordinator Outreach  Outreach attempted x 2.  Left message on voicemail with call back information and requested return call.  Plan: Care Coordinator mailed out care coordination introduction letter on 08/16/18  . Care Coordinator will do no further outreaches at this time.    Clary Salas RN  Clinic Care Coordinator  [phone]  [email or web address]

## 2018-09-14 ENCOUNTER — OFFICE VISIT (OUTPATIENT)
Dept: FAMILY MEDICINE | Facility: CLINIC | Age: 83
End: 2018-09-14

## 2018-09-14 VITALS
BODY MASS INDEX: 23.55 KG/M2 | OXYGEN SATURATION: 97 % | HEIGHT: 62 IN | RESPIRATION RATE: 16 BRPM | DIASTOLIC BLOOD PRESSURE: 64 MMHG | SYSTOLIC BLOOD PRESSURE: 120 MMHG | WEIGHT: 128 LBS | HEART RATE: 77 BPM

## 2018-09-14 DIAGNOSIS — E78.2 MIXED HYPERLIPIDEMIA: ICD-10-CM

## 2018-09-14 DIAGNOSIS — R41.3 POOR SHORT-TERM MEMORY: ICD-10-CM

## 2018-09-14 DIAGNOSIS — Z00.00 MEDICARE ANNUAL WELLNESS VISIT, SUBSEQUENT: Primary | ICD-10-CM

## 2018-09-14 DIAGNOSIS — E03.9 HYPOTHYROIDISM, UNSPECIFIED TYPE: ICD-10-CM

## 2018-09-14 DIAGNOSIS — Z71.89 ADVANCED DIRECTIVES, COUNSELING/DISCUSSION: ICD-10-CM

## 2018-09-14 DIAGNOSIS — I10 ESSENTIAL HYPERTENSION: ICD-10-CM

## 2018-09-14 LAB
% GRANULOCYTES: 79.4 % (ref 42.2–75.2)
HCT VFR BLD AUTO: 33.5 % (ref 35–46)
HEMOGLOBIN: 11.2 G/DL (ref 11.8–15.5)
LYMPHOCYTES NFR BLD AUTO: 15.4 % (ref 20.5–51.1)
MCH RBC QN AUTO: 29.8 PG (ref 27–31)
MCHC RBC AUTO-ENTMCNC: 33.5 G/DL (ref 33–37)
MCV RBC AUTO: 88.9 FL (ref 80–100)
MONOCYTES NFR BLD AUTO: 5.2 % (ref 1.7–9.3)
PLATELET # BLD AUTO: 238 K/UL (ref 140–450)
RBC # BLD AUTO: 3.77 X10/CMM (ref 3.7–5.2)
WBC # BLD AUTO: 7.5 X10/CMM (ref 3.8–11)

## 2018-09-14 PROCEDURE — G0439 PPPS, SUBSEQ VISIT: HCPCS | Performed by: FAMILY MEDICINE

## 2018-09-14 PROCEDURE — 85025 COMPLETE CBC W/AUTO DIFF WBC: CPT | Performed by: FAMILY MEDICINE

## 2018-09-14 PROCEDURE — 36415 COLL VENOUS BLD VENIPUNCTURE: CPT | Performed by: FAMILY MEDICINE

## 2018-09-14 PROCEDURE — 99214 OFFICE O/P EST MOD 30 MIN: CPT | Mod: 25 | Performed by: FAMILY MEDICINE

## 2018-09-14 RX ORDER — LISINOPRIL 20 MG/1
TABLET ORAL
Qty: 90 TABLET | Refills: 3 | Status: SHIPPED | OUTPATIENT
Start: 2018-09-14 | End: 2019-10-30

## 2018-09-14 RX ORDER — HYDROCHLOROTHIAZIDE 25 MG/1
TABLET ORAL
Qty: 90 TABLET | Refills: 1 | Status: SHIPPED | OUTPATIENT
Start: 2018-09-14 | End: 2019-03-22

## 2018-09-14 RX ORDER — LEVOTHYROXINE SODIUM 25 UG/1
TABLET ORAL
Qty: 90 TABLET | Refills: 3 | Status: SHIPPED | OUTPATIENT
Start: 2018-09-14 | End: 2019-10-30

## 2018-09-14 NOTE — PROGRESS NOTES
SUBJECTIVE:   Lyla Duran is a 89 year old female who presents for Preventive Visit.  CONCERNS:   Are you in the first 12 months of your Medicare Part B coverage?  No    Healthy Habits:    Do you get at least three servings of calcium containing foods daily (dairy, green leafy vegetables, etc.)? yes    Amount of exercise or daily activities, outside of work: 7 day(s) per week    Problems taking medications regularly No    Medication side effects: No    Have you had an eye exam in the past two years? yes    Do you see a dentist twice per year? yes    Do you have sleep apnea, excessive snoring or daytime drowsiness?no      Ability to successfully perform activities of daily living: No, needs assistance with: transportation, bathing and laundry    Home safety:  none identified     Hearing impairment: Wears hearing aids    Fall risk:  Fallen 2 or more times in the past year?: No  Any fall with injury in the past year?: No    COGNITIVE SCREEN  1) Repeat 3 items (Leader, Season, Table)    2) Clock draw: NORMAL  3) 3 item recall: Recalls NO objects   Results: 0 items recalled: PROBABLE COGNITIVE IMPAIRMENT, **INFORM PROVIDER**    Mini-CogTM Copyright S Cruzito. Licensed by the author for use in [facility]; reprinted with permission (soledy@[facility]). All rights reserved.        Reviewed and updated as needed this visit by clinical staff  Tobacco  Allergies  Meds       Has history of hyperlipidemia.  On statin for this, denies any significant side effects of this medication.      Latest labs reviewed:    Recent Labs   Lab Test  05/31/13   1117  05/29/12   1134   CHOL  204*  188   HDL  87*  90*   LDL  105*  85   TRIG  58  66        Lab Results   Component Value Date    AST 15 02/01/2016      History of hypothyroidism.  On replacement therapy.  She has not experienced any significant side effects of this medication.   The patient denies of fatigue, weight changes, heat/cold intolerance, hair changes, nail  changes, bowel changes.    The last 5 available TSH values from [facility]'s reference lab, [facility]:  TSH   Date Value Ref Range Status   09/12/2017 0.202 (L) 0.450 - 4.500 uIU/mL Final       Any TSH results from the [facility] lab system:  No results found for: TSH, TSH      Reviewed and updated as needed this visit by Provider        Social History   Substance Use Topics     Smoking status: Never Smoker     Smokeless tobacco: Never Used     Alcohol use 0.5 oz/week     1 Standard drinks or equivalent per week       If you drink alcohol do you typically have >3 drinks per day or >7 drinks per week? Not Applicable                        Today's PHQ-2 Score:   PHQ-2 ( 1999 [employer]) 9/14/2018 9/12/2017   Q1: Little interest or pleasure in doing things 0 0   Q2: Feeling down, depressed or hopeless 0 0   PHQ-2 Score 0 0       Do you feel safe in your environment - Yes    Do you have a Health Care Directive?: Yes: Advance Directive has been received and scanned.    Current providers sharing in care for this patient include:   Patient Care Team:  Rajan Butt MD as PCP - General (Family Practice)    The following health maintenance items are reviewed in Epic and correct as of today:  Health Maintenance   Topic Date Due     ADVANCE DIRECTIVE PLANNING Q5 YRS  03/03/1980     INFLUENZA VACCINE (1) 09/01/2018     TSH Q1 YEAR  09/12/2018     FALL RISK ASSESSMENT  09/12/2018     PHQ-2 Q1 YR  09/12/2018     TETANUS IMMUNIZATION (SYSTEM ASSIGNED)  04/03/2019     PNEUMOCOCCAL  Completed     Patient Active Problem List   Diagnosis     Essential hypertension     Mixed hyperlipidemia     Presbyacusis     Disequilibrium     Hypothyroid     Poor short-term memory     FH: CAD (coronary artery disease)     Health Care Home     Pneumonia     Past Surgical History:   Procedure Laterality Date     APPENDECTOMY       CATARACT IOL, RT/LT      Cataract IOL RT/LT     EYE SURGERY      retinal hemorrage      THYROIDECTOMY       Thyroidectomy      "TONSILLECTOMY & ADENOIDECTOMY       VITRECTOMY PARSPLANA, SECONDARY INTRAOCULAR LENS IMPLANT, COMBINED  9/18/2013    Procedure: COMBINED VITRECTOMY PARSPLANA, SECONDARY INTRAOCULAR LENS IMPLANT;  LEFT 20 GAUGE VITRECTOMY PARSPLANA, EXCHANGE INTRAOCULAR LENS IMPLANT;  Surgeon: Brandon Umanzor MD;  Location: [facility]       Social History   Substance Use Topics     Smoking status: Never Smoker     Smokeless tobacco: Never Used     Alcohol use 0.5 oz/week     1 Standard drinks or equivalent per week     Family History   Problem Relation Age of Onset     Cancer Mother      HEART DISEASE Father      C.A.D. Brother      Cancer Brother      C.A.D. Brother                ROS:  Constitutional, HEENT, cardiovascular, pulmonary, GI, , musculoskeletal, neuro, skin, endocrine and psych systems are negative, except as otherwise noted.    OBJECTIVE:   /64  Pulse 77  Resp 16  Ht 1.575 m (5' 2\")  Wt 58.1 kg (128 lb)  SpO2 97%  BMI 23.41 kg/m2 Estimated body mass index is 23.41 kg/(m^2) as calculated from the following:    Height as of this encounter: 1.575 m (5' 2\").    Weight as of this encounter: 58.1 kg (128 lb).  EXAM:   GENERAL APPEARANCE: healthy, alert and no distress  EYES: Eyes grossly normal to inspection, PERRL and conjunctivae and sclerae normal  HENT: ear canals and TM's normal, nose and mouth without ulcers or lesions, oropharynx clear and oral mucous membranes moist  NECK: no adenopathy, no asymmetry, masses, or scars and thyroid normal to palpation  RESP: lungs clear to auscultation - no rales, rhonchi or wheezes  BREAST: normal without masses, tenderness or nipple discharge and no palpable axillary masses or adenopathy  CV: regular rate and rhythm, normal S1 S2, no S3 or S4, no murmur, click or rub, no peripheral edema and peripheral pulses strong  ABDOMEN: soft, nontender, no hepatosplenomegaly, no masses and bowel sounds normal  MS: no musculoskeletal defects are noted and gait is age appropriate " without ataxia  SKIN: no suspicious lesions or rashes  NEURO: Normal strength and tone, sensory exam grossly normal, mentation intact and speech normal  PSYCH: mentation appears normal and affect normal/bright        ASSESSMENT / PLAN:   Lyla was seen today for physical, hearing screening and memory loss.    Diagnoses and all orders for this visit:    Medicare annual wellness visit, subsequent    Mixed hyperlipidemia  Long discussion, will stop med    Essential hypertension  Discussed current hypertension treatment guidelines, including indications for treatment and treatment options.  Discussed the importance for aggressive management of HTN to prevent vascular complications later.  Recommended lower fat, lower carbohydrate, and lower sodium (<2000 mg)diet.  Discussed required intervals for follow up on HTN, lab studies.  Recommened pt. follow their blood pressures outside the clinic to ensure that BPs are remaining within guidelines, and to contact me if the readings are not within guidelines on a regular basis so we can adjust treatment as needed.    -     CBC with Diff/Plt ([facility])  -     Basic Metabolic Panel (8) ([facility])    Poor short-term memory  Doing well with neices help  Advanced directives, counseling/discussion    Hypothyroidism, unspecified type  Discussed signs and symptoms of hypo and hyperthyroidism.  Reviewed treatment options.   Recommended absolute medication compliance to avoid adding any additionial variance to the labs.    -     TSH ([facility])    Other orders  -     Cancel: Lipid Panel ([facility])        End of Life Planning:  Patient currently has an advanced directive: Yes.  Practitioner is supportive of decision.    COUNSELING:  Reviewed preventive health counseling, as reflected in patient instructions       Regular exercise       Healthy diet/nutrition    BP Readings from Last 1 Encounters:   09/14/18 120/64     Estimated body mass index is 23.41 kg/(m^2) as calculated from the following:     "Height as of this encounter: 1.575 m (5' 2\").    Weight as of this encounter: 58.1 kg (128 lb).           reports that she has never smoked. She has never used smokeless tobacco.      Appropriate preventive services were discussed with this patient, including applicable screening as appropriate for cardiovascular disease, diabetes, osteopenia/osteoporosis, and glaucoma.  As appropriate for age/gender, discussed screening for colorectal cancer, prostate cancer, breast cancer, and cervical cancer. Checklist reviewing preventive services available has been given to the patient.    Reviewed patients plan of care and provided an AVS. The Basic Care Plan (routine screening as documented in Health Maintenance) for Lyla meets the Care Plan requirement. This Care Plan has been established and reviewed with the Patient.    Counseling Resources:  ATP IV Guidelines  Pooled Cohorts Equation Calculator  Breast Cancer Risk Calculator  FRAX Risk Assessment  ICSI Preventive Guidelines  Dietary Guidelines for Americans, 2010  USDA's MyPlate  ASA Prophylaxis  Lung CA Screening    Rajan Butt MD  [facility]  "

## 2018-09-14 NOTE — MR AVS SNAPSHOT
After Visit Summary   9/14/2018    Lyla Duran    MRN: [id number]           Patient Information     Date Of Birth          3/3/1925        Visit Information        Provider Department      9/14/2018 9:00 AM Rajan Butt MD [facility]        Today's Diagnoses     Medicare annual wellness visit, subsequent    -  1    Mixed hyperlipidemia        Essential hypertension        Poor short-term memory        Advanced directives, counseling/discussion        Hypothyroidism, unspecified type          Care Instructions      Preventive Health Recommendations    Female Ages 65 +    Yearly exam:     See your health care provider every year in order to  o Review health changes.   o Discuss preventive care.    o Review your medicines if your doctor has prescribed any.      You no longer need a yearly Pap test unless you've had an abnormal Pap test in the past 10 years. If you have vaginal symptoms, such as bleeding or discharge, be sure to talk with your provider about a Pap test.      Every 1 to 2 years, have a mammogram.  If you are over 69, talk with your health care provider about whether or not you want to continue having screening mammograms.      Every 10 years, have a colonoscopy. Or, have a yearly FIT test (stool test). These exams will check for colon cancer.       Have a cholesterol test every 5 years, or more often if your doctor advises it.       Have a diabetes test (fasting glucose) every three years. If you are at risk for diabetes, you should have this test more often.       At age 65, have a bone density scan (DEXA) to check for osteoporosis (brittle bone disease).    Shots:    Get a flu shot each year.    Get a tetanus shot every 10 years.    Talk to your doctor about your pneumonia vaccines. There are now two you should receive - Pneumovax (PPSV 23) and Prevnar (PCV 13).    Talk to your pharmacist about the shingles vaccine.    Talk to your doctor about the hepatitis B  vaccine.    Nutrition:     Eat at least 5 servings of fruits and vegetables each day.      Eat whole-grain bread, whole-wheat pasta and brown rice instead of white grains and rice.      Get adequate Calcium and Vitamin D.     Lifestyle    Exercise at least 150 minutes a week (30 minutes a day, 5 days a week). This will help you control your weight and prevent disease.      Limit alcohol to one drink per day.      No smoking.       Wear sunscreen to prevent skin cancer.       See your dentist twice a year for an exam and cleaning.      See your eye doctor every 1 to 2 years to screen for conditions such as glaucoma, macular degeneration and cataracts.          Follow-ups after your visit        Who to contact     If you have questions or need follow up information about today's clinic visit or your schedule please contact [facility] directly at [phone].  Normal or non-critical lab and imaging results will be communicated to you by [employer], letter or phone within 4 business days after the clinic has received the results. If you do not hear from us within 7 days, please contact the clinic through [employer] or phone. If you have a critical or abnormal lab result, we will notify you by phone as soon as possible.  Submit refill requests through [facility] or call your pharmacy and they will forward the refill request to us. Please allow 3 business days for your refill to be completed.          Additional Information About Your Visit        [facility] Information     [facility] gives you secure access to your electronic health record. If you see a primary care provider, you can also send messages to your care team and make appointments. If you have questions, please call your primary care clinic.  If you do not have a primary care provider, please call [phone] and they will assist you.        Care EveryWhere ID     This is your Care EveryWhere ID. This could be used by other organizations to access your  "[place] medical records  [id number]        Your Vitals Were     Pulse Respirations Height Pulse Oximetry BMI (Body Mass Index)       77 16 1.575 m (5' 2\") 97% 23.41 kg/m2        Blood Pressure from Last 3 Encounters:   09/14/18 120/64   05/25/18 118/68   05/23/18 148/57    Weight from Last 3 Encounters:   09/14/18 58.1 kg (128 lb)   05/25/18 61.2 kg (135 lb)   05/23/18 63 kg (138 lb 14.2 oz)              We Performed the Following     Basic Metabolic Panel (8) ([facility])     CBC with Diff/Plt ([facility])     TSH ([facility])          Today's Medication Changes          These changes are accurate as of 9/14/18  9:35 AM.  If you have any questions, ask your nurse or doctor.               Stop taking these medicines if you haven't already. Please contact your care team if you have questions.     lovastatin 20 MG tablet   Commonly known as:  MEVACOR   Stopped by:  Rajan Butt MD                Where to get your medicines      These medications were sent to [facility] - [place], [place] - [address]  [address] [address]     Phone:  [phone]     hydrochlorothiazide 25 MG tablet    levothyroxine 25 MCG tablet    lisinopril 20 MG tablet                Primary Care Provider Office Phone # Fax #    Rajan Butt [phone] [address] [place] [address]        Equal Access to Services     LUL COLEMAN AH: Hadii taz ku hadasho Soomaali, waaxda luqadaha, qaybta kaalmada adeegyada, waxay dashawn willoughby. So [facility] [phone].    ATENCIÓN: Si habla español, tiene a eckert disposición servicios gratuitos de asistencia lingüística. Llame al [phone].    We comply with applicable federal civil rights laws and [place] laws. We do not discriminate on the basis of race, color, national origin, age, disability, sex, sexual orientation, or gender identity.            Thank you!     Thank you for choosing [facility]  for your " care. Our goal is always to provide you with excellent care. Hearing back from our patients is one way we can continue to improve our services. Please take a few minutes to complete the written survey that you may receive in the mail after your visit with us. Thank you!             Your Updated Medication List - Protect others around you: Learn how to safely use, store and throw away your medicines at www.disposemymeds.org.          This list is accurate as of 9/14/18  9:35 AM.  Always use your most recent med list.                   Brand Name Dispense Instructions for use Diagnosis    hydrochlorothiazide 25 MG tablet    HYDRODIURIL    90 tablet    TAKE ONE (1) TABLET (25 mg) BY mouth every morning    Essential hypertension       levothyroxine 25 MCG tablet    SYNTHROID/LEVOTHROID    90 tablet    TAKE ONE (1) TABLET BY MOUTH DAILY ...THANK YOU...    Hypothyroidism, unspecified type       lisinopril 20 MG tablet    PRINIVIL/ZESTRIL    90 tablet    TAKE ONE (1) TABLET BY MOUTH DAILY ...THANK YOU...    Essential hypertension

## 2018-09-15 LAB
BUN SERPL-MCNC: 23 MG/DL (ref 10–36)
BUN/CREATININE RATIO: 28 (ref 12–28)
CALCIUM SERPL-MCNC: 9.3 MG/DL (ref 8.7–10.3)
CHLORIDE SERPLBLD-SCNC: 100 MMOL/L (ref 96–106)
CREAT SERPL-MCNC: 0.83 MG/DL (ref 0.57–1)
EGFR IF AFRICN AM: 70 ML/MIN/1.73
EGFR IF NONAFRICN AM: 61 ML/MIN/1.73
GLUCOSE SERPL-MCNC: 76 MG/DL (ref 65–99)
POTASSIUM SERPL-SCNC: 4.4 MMOL/L (ref 3.5–5.2)
SODIUM SERPL-SCNC: 140 MMOL/L (ref 134–144)
TOTAL CO2: 26 MMOL/L (ref 20–29)
TSH BLD-ACNC: 0.08 UIU/ML (ref 0.45–4.5)

## 2018-12-14 NOTE — PROGRESS NOTES
[facility]          INPATIENT SWALLOW  EVALUATION  PLAN OF TREATMENT FOR OUTPATIENT REHABILITATION  (COMPLETE FOR INITIAL CLAIMS ONLY)  Patient's Last Name, First Name, M.I.  YOB: 1925  Lyla Duran     Provider's Name   [facility]   Medical Record No.  [id number]     Start of Care Date:   5/18/18   Onset Date:   5/18/18   Type:     ___PT   ____OT  ___X_SLP Medical Diagnosis:  Pneumonia     Treatment Diagnosis:   Oropharyngeal dysphagia Visits from SOC:  1     _________________________________________________________________________________  Plan of Treatment/Functional Goals:  See Epic care plan.                           Goals   1.  See Epic care plan.                           2.                             3.                             4.                             5.                            6.                              7.                              8.                                      Dr. Jelena Garcia       I CERTIFY THE NEED FOR THESE SERVICES FURNISHED UNDER        THIS PLAN OF TREATMENT AND WHILE UNDER MY CARE     (Physician co-signature of this document indicates review and certification of the therapy plan).                                    Initial Assessment        See Epic Evaluation

## 2018-12-17 NOTE — PROGRESS NOTES
[facility]      OUTPATIENT PHYSICAL THERAPY EVALUATION  PLAN OF TREATMENT FOR OUTPATIENT REHABILITATION  (COMPLETE FOR INITIAL CLAIMS ONLY)  Patient's Last Name, First Name, M.I.  YOB: 1925  Lyla Duran                        Provider's Name  [facility] Medical Record No.  [id number]                               Onset Date:  05/17/18   Start of Care Date:  12/19/18      Type:     _X_PT   ___OT   ___SLP Medical Diagnosis:  pneumonia, confusion                        PT Diagnosis:  Difficulty ambulating   Visits from SOC:  1   _________________________________________________________________________________  Plan of Treatment/Functional Goals    Planned Interventions:  ,    bed mobility training, gait training, neuromuscular re-education, strengthening, transfer training,       Goals: See Physical Therapy Goals on Care Plan in [employer] electronic health record.    Therapy Frequency: 5 times/week  Predicted Duration of Therapy Intervention: 1 week  _________________________________________________________________________________    I CERTIFY THE NEED FOR THESE SERVICES FURNISHED UNDER        THIS PLAN OF TREATMENT AND WHILE UNDER MY CARE     (Physician co-signature of this document indicates review and certification of the therapy plan).                Certification date from: 12/19/18, Certification date to: 12/26/18    Referring Physician: Jelena Garcia MD            Initial Assessment        See Physical Therapy evaluation dated 12/19/18 in Epic electronic health record.

## 2019-01-20 NOTE — PROGRESS NOTES
05/19/18 1500   Quick Adds   Type of Visit Initial Occupational Therapy Evaluation   Self-Care   Equipment Currently Used at Home walker, rolling   Functional Level   Ambulation 1-->assistive equipment   Transferring 1-->assistive equipment   Toileting 1-->assistive equipment   Bathing 3-->assistive equipment and person   Dressing 0-->independent   Eating 0-->independent   Communication 0-->understands/communicates without difficulty   Swallowing 0-->swallows foods/liquids without difficulty   Cognition 1 - attention or memory deficits   Fall history within last six months no   Prior Functional Level Comment Pt has assist from home care 3x/week for 3-4 hours for deep cleaning and showers, family provides daily checks with reminders for medications. Family reports pt does best when in her own envirionment   General Information   Onset of Illness/Injury or Date of Surgery - Date 05/17/18   Referring Physician Esther Rice PA-C   Patient/Family Goals Statement Family goals for pt to return to [facility]   Additional Occupational Profile Info/Pertinent History of Current Problem Pt is a 89 yo female admitted for pnumonia   Precautions/Limitations fall precautions   Cognitive Status Examination   Orientation person   Level of Consciousness alert;confused   Memory impaired   Executive Function Self awareness/monitoring impaired;Planning ability impaired;Initiation impaired   Cognitive Comment Pt has baseline cognitive deficits, pt is confused at time of eval, family reports pt does best when in her own routine and in her own home   Visual Perception   Visual Perception Wears glasses   Pain Assessment   Patient Currently in Pain No   Range of Motion (ROM)   ROM Comment BUE WFL for ADLs   Strength   Strength Comments BUE WFL for ADLs   Hand Strength   Hand Strength Comments BUE WFL for ADLs   Coordination   Upper Extremity Coordination No deficits were identified   Transfer Skill: Bed to Chair/Chair to Bed   Level of  [place]: Bed to Chair other (see comments)   Physical Assist/Nonphysical Assist: Bed to Chair (Pt refused ambulation due to fear of falling)   Transfer Skill: Sit to Stand   Level of [place]: Sit/Stand minimum assist (75% patients effort)   Physical Assist/Nonphysical Assist: Sit/Stand supervision;verbal cues;1 person assist   Assistive Device for Transfer: Sit/Stand rolling walker   Bathing   Level of [place] minimum assist (75% patients effort)   Upper Body Dressing   Level of [place]: Dress Upper Body stand-by assist   Lower Body Dressing   Level of [place]: Dress Lower Body minimum assist (75% patients effort)   Toileting   Level of [place]: Toilet moderate assist (50% patients effort)   Grooming   Level of [place]: Grooming minimum assist (75% patients effort)   Eating/Self Feeding   Level of [place]: Eating stand-by assist   Instrumental Activities of Daily Living (IADL)   Previous Responsibilities housekeeping   IADL Comments pt makes her own bed and does daily cleanup   Activities of Daily Living Analysis   Impairments Contributing to Impaired Activities of Daily Living cognition impaired;strength decreased;fear and anxiety   General Therapy Interventions   Planned Therapy Interventions ADL retraining;cognition;progressive activity/exercise;transfer training   Clinical Impression   Criteria for Skilled Therapeutic Interventions Met yes, treatment indicated   OT Diagnosis Reduced IND in ADls   Influenced by the following impairments cognition impaired;strength decreased;fear and anxiety   Assessment of Occupational Performance 3-5 Performance Deficits   Identified Performance Deficits dressing, toileting, bathing, bed mobility, transfers/ambulation   Clinical Decision Making (Complexity) Moderate complexity   Therapy Frequency daily   Predicted Duration of Therapy Intervention (days/wks) 3 days   Anticipated Discharge Disposition Transitional Care Facility   Risks and  "Benefits of Treatment have been explained. Yes   Patient, Family & other staff in agreement with plan of care Yes   Therapy Certification   Start of Care Date 05/19/18   Certification date from 05/19/18   Certification date to 05/22/18   Medical Diagnosis Pnumonia   Certification I certify the need for these services furnished under this plan of treatment and while under my care.  (Physician co-signature of this document indicates review and certification of the therapy plan).   [facility] [employer] [employer] \"6 Clicks\"   2016, Trustees of [facility], under license to [employer].  All rights reserved.   6 Clicks Short Forms Basic Mobility Inpatient Short Form;Daily Activity Inpatient Short Form   [facility] AM-PAC  \"6 Clicks\" Daily Activity Inpatient Short Form   1. Putting on and taking off regular lower body clothing? 3 - A Little   2. Bathing (including washing, rinsing, drying)? 3 - A Little   3. Toileting, which includes using toilet, bedpan or urinal? 3 - A Little   4. Putting on and taking off regular upper body clothing? 3 - A Little   5. Taking care of personal grooming such as brushing teeth? 3 - A Little   6. Eating meals? 4 - None   Daily Activity Raw Score (Score out of 24.Lower scores equate to lower levels of function) 19   Total Evaluation Time   Total Evaluation Time (Minutes) 10                                                                                 [facility]      OUTPATIENT OCCUPATIONAL THERAPY  EVALUATION  PLAN OF TREATMENT FOR OUTPATIENT REHABILITATION  (COMPLETE FOR INITIAL CLAIMS ONLY)  Patient's Last Name, First Name, M.I.  YOB: 1925  Lyla Duran                          Provider's Name  [facility] Medical Record No.  [id number]                               Onset Date:  05/17/18   Start of Care Date:  05/19/18     Type:     ___PT   _X_OT   ___SLP Medical Diagnosis:  Pnumonia                        OT " Diagnosis:  Reduced IND in ADls   Visits from SOC:  1   _________________________________________________________________________________  Plan of Treatment/Functional Goals    Planned Interventions: ADL retraining, cognition, progressive activity/exercise, transfer training,       Goals: See Occupational Therapy Goals on Care Plan in [facility] electronic health record.    Therapy Frequency: daily  Predicted Duration of Therapy Intervention: 3 days  _________________________________________________________________________________    I CERTIFY THE NEED FOR THESE SERVICES FURNISHED UNDER        THIS PLAN OF TREATMENT AND WHILE UNDER MY CARE     (Physician co-signature of this document indicates review and certification of the therapy plan).                Certification date from: 05/19/18, Certification date to: 05/22/18    Referring Physician: Esther Rice PA-C            Initial Assessment        See Occupational Therapy evaluation dated 05/19/18 in Epic electronic health record.

## 2019-03-22 DIAGNOSIS — I10 ESSENTIAL HYPERTENSION: ICD-10-CM

## 2019-03-22 RX ORDER — HYDROCHLOROTHIAZIDE 25 MG/1
TABLET ORAL
Qty: 90 TABLET | Refills: 0 | Status: SHIPPED | OUTPATIENT
Start: 2019-03-22 | End: 2019-06-24

## 2019-06-24 DIAGNOSIS — I10 ESSENTIAL HYPERTENSION: ICD-10-CM

## 2019-06-24 NOTE — TELEPHONE ENCOUNTER
Refill hydrochlorothiazide 25mg  lov 09/14/2018  Labs 09/14/2018  Alba Peralta MA on 6/24/2019 at 2:53 PM

## 2019-06-25 RX ORDER — HYDROCHLOROTHIAZIDE 25 MG/1
TABLET ORAL
Qty: 90 TABLET | Refills: 0 | Status: SHIPPED | OUTPATIENT
Start: 2019-06-25 | End: 2019-09-30

## 2019-09-30 DIAGNOSIS — I10 ESSENTIAL HYPERTENSION: ICD-10-CM

## 2019-09-30 NOTE — TELEPHONE ENCOUNTER
Refill medication hydrochlorothiazide  LOV 09/14/2018  Labs 09/14/2018  Alba Peralta MA on 9/30/2019 at 2:18 PM

## 2019-10-01 RX ORDER — HYDROCHLOROTHIAZIDE 25 MG/1
TABLET ORAL
Qty: 90 TABLET | Refills: 0 | Status: SHIPPED | OUTPATIENT
Start: 2019-10-01 | End: 2019-12-31

## 2019-10-30 DIAGNOSIS — E03.9 HYPOTHYROIDISM, UNSPECIFIED TYPE: ICD-10-CM

## 2019-10-30 DIAGNOSIS — I10 ESSENTIAL HYPERTENSION: ICD-10-CM

## 2019-10-30 NOTE — TELEPHONE ENCOUNTER
Refill medication Lisinopril and Levothyroxine  LOV 09/14/2018  Labs 09/14/2018  No upcoming appt scheduled  Alba Peralta MA on 10/30/2019 at 3:18 PM

## 2019-10-31 RX ORDER — LEVOTHYROXINE SODIUM 25 UG/1
TABLET ORAL
Qty: 90 TABLET | Refills: 2 | Status: SHIPPED | OUTPATIENT
Start: 2019-10-31 | End: 2020-08-20

## 2019-10-31 RX ORDER — LISINOPRIL 20 MG/1
TABLET ORAL
Qty: 90 TABLET | Refills: 2 | Status: SHIPPED | OUTPATIENT
Start: 2019-10-31 | End: 2020-08-20

## 2019-12-31 DIAGNOSIS — I10 ESSENTIAL HYPERTENSION: ICD-10-CM

## 2019-12-31 RX ORDER — HYDROCHLOROTHIAZIDE 25 MG/1
25 TABLET ORAL EVERY MORNING
Qty: 90 TABLET | Refills: 0 | Status: SHIPPED | OUTPATIENT
Start: 2019-12-31 | End: 2020-04-07

## 2020-03-01 ENCOUNTER — HEALTH MAINTENANCE LETTER (OUTPATIENT)
Age: 85
End: 2020-03-01

## 2020-04-07 DIAGNOSIS — I10 ESSENTIAL HYPERTENSION: ICD-10-CM

## 2020-04-07 RX ORDER — HYDROCHLOROTHIAZIDE 25 MG/1
25 TABLET ORAL EVERY MORNING
Qty: 90 TABLET | Refills: 0 | Status: SHIPPED | OUTPATIENT
Start: 2020-04-07 | End: 2020-07-06

## 2020-04-07 NOTE — TELEPHONE ENCOUNTER
Medication Refill     Nahomi Puri, AUSTEN CNP  [facility] Triage 10 minutes ago (3:22 PM)       Given refill, but has not been seen since 9/2018. I don't want to make her come in while COVID is being a menace. Do you think she has the technology for a virtual visit? Otherwise let's get her in post-COVID.   Thanks,   AJT      Called patients prasanna Dorantes and let her know that we refilled the medication hydrochlorothiazide  for 3 months and patient will need to be seen as soon as able after the coronavirus.  Prasanna says she does not want to bring her in now but will call and schedule an appointment as soon as she feels patient is able to be out.

## 2020-07-05 DIAGNOSIS — I10 ESSENTIAL HYPERTENSION: ICD-10-CM

## 2020-07-06 RX ORDER — HYDROCHLOROTHIAZIDE 25 MG/1
25 TABLET ORAL EVERY MORNING
Qty: 90 TABLET | Refills: 0 | Status: SHIPPED | OUTPATIENT
Start: 2020-07-06 | End: 2020-10-01

## 2020-08-20 DIAGNOSIS — I10 ESSENTIAL HYPERTENSION: ICD-10-CM

## 2020-08-20 DIAGNOSIS — E03.9 HYPOTHYROIDISM, UNSPECIFIED TYPE: ICD-10-CM

## 2020-08-20 RX ORDER — LISINOPRIL 20 MG/1
TABLET ORAL
Qty: 90 TABLET | Refills: 1 | Status: SHIPPED | OUTPATIENT
Start: 2020-08-20 | End: 2021-03-01

## 2020-08-20 RX ORDER — LEVOTHYROXINE SODIUM 25 UG/1
TABLET ORAL
Qty: 90 TABLET | Refills: 1 | Status: SHIPPED | OUTPATIENT
Start: 2020-08-20 | End: 2021-03-01

## 2020-10-01 ENCOUNTER — OFFICE VISIT (OUTPATIENT)
Dept: FAMILY MEDICINE | Facility: CLINIC | Age: 85
End: 2020-10-01

## 2020-10-01 VITALS
TEMPERATURE: 98.3 F | SYSTOLIC BLOOD PRESSURE: 122 MMHG | DIASTOLIC BLOOD PRESSURE: 64 MMHG | WEIGHT: 140 LBS | BODY MASS INDEX: 25.61 KG/M2 | RESPIRATION RATE: 16 BRPM | OXYGEN SATURATION: 94 % | HEART RATE: 74 BPM

## 2020-10-01 DIAGNOSIS — E03.8 OTHER SPECIFIED HYPOTHYROIDISM: Primary | ICD-10-CM

## 2020-10-01 DIAGNOSIS — Z00.00 ENCOUNTER FOR MEDICARE ANNUAL WELLNESS EXAM: ICD-10-CM

## 2020-10-01 DIAGNOSIS — F03.90 DEMENTIA WITHOUT BEHAVIORAL DISTURBANCE, UNSPECIFIED DEMENTIA TYPE: ICD-10-CM

## 2020-10-01 DIAGNOSIS — E78.2 MIXED HYPERLIPIDEMIA: ICD-10-CM

## 2020-10-01 DIAGNOSIS — I10 ESSENTIAL HYPERTENSION: ICD-10-CM

## 2020-10-01 LAB
% GRANULOCYTES: 75.5 % (ref 42.2–75.2)
HCT VFR BLD AUTO: 29.6 % (ref 35–46)
HEMOGLOBIN: 10.2 G/DL (ref 11.8–15.5)
LYMPHOCYTES NFR BLD AUTO: 18.9 % (ref 20.5–51.1)
MCH RBC QN AUTO: 30.8 PG (ref 27–31)
MCHC RBC AUTO-ENTMCNC: 34.5 G/DL (ref 33–37)
MCV RBC AUTO: 89.3 FL (ref 80–100)
MONOCYTES NFR BLD AUTO: 5.6 % (ref 1.7–9.3)
PLATELET # BLD AUTO: 265 K/UL (ref 140–450)
RBC # BLD AUTO: 3.31 X10/CMM (ref 3.7–5.2)
WBC # BLD AUTO: 6.4 X10/CMM (ref 3.8–11)

## 2020-10-01 PROCEDURE — 85025 COMPLETE CBC W/AUTO DIFF WBC: CPT | Performed by: FAMILY MEDICINE

## 2020-10-01 PROCEDURE — G0439 PPPS, SUBSEQ VISIT: HCPCS | Performed by: FAMILY MEDICINE

## 2020-10-01 PROCEDURE — 99214 OFFICE O/P EST MOD 30 MIN: CPT | Mod: 25 | Performed by: FAMILY MEDICINE

## 2020-10-01 RX ORDER — HYDROCHLOROTHIAZIDE 25 MG/1
25 TABLET ORAL EVERY MORNING
Qty: 90 TABLET | Refills: 3 | Status: SHIPPED | OUTPATIENT
Start: 2020-10-01 | End: 2021-10-25

## 2020-10-01 NOTE — PROGRESS NOTES
"Essential Hypertension   Remains well controlled when checked out of clinic.   she has not experienced any significant side effects from medications for hypertension.    NO active cardiac complaints or symptoms with exercise.  Current medications for treatment:  ACE inhibitors (Lisinopril, Ramipril,Captopril,Benazepril) and Diuretic thiazide (chlorthalidone, hydrochlorothiazide, indapamide    Reviewed last 6 BP readings in chart:  BP Readings from Last 6 Encounters:   10/01/20 122/64   09/14/18 120/64   05/25/18 118/68   05/23/18 148/57   09/12/17 122/72   02/01/16 192/85         History of hypothyroidism.  On replacement therapy.  She has not experienced any significant side effects of this medication.   The patient denies of fatigue, weight changes, heat/cold intolerance, hair changes, nail changes, bowel changes.    The last 5 available TSH values from [facility]'s reference lab, [facility]:  TSH   Date Value Ref Range Status   09/14/2018 0.078 (L) 0.450 - 4.500 uIU/mL Final   09/12/2017 0.202 (L) 0.450 - 4.500 uIU/mL Final       Any TSH results from the [facility] lab system:  No results found for: TSH, TSH  SUBJECTIVE:   Lyla Duran is a 89 year old female who presents for Preventive Visit.      Patient has been advised of split billing requirements and indicates understanding: Yes  Are you in the first 12 months of your Medicare Part B coverage?  No    Physical Health:    In general, how would you rate your overall physical health? good    Outside of work, how many days during the week do you exercise? 4-5 days/week    Outside of work, approximately how many minutes a day do you exercise?15-30 minutes    If you drink alcohol do you typically have >3 drinks per day or >7 drinks per week? No    Do you usually eat at least 4 servings of fruit and vegetables a day, include whole grains & fiber and avoid regularly eating high fat or \"junk\" foods? Yes    Do you have any problems taking medications regularly?  No    Do you have any " side effects from medications? not applicable    Needs assistance for the following daily activities: shopping, preparing meals, bathing, laundry, money management and taking medicine    Which of the following safety concerns are present in your home?  none identified     Hearing impairment: Yes, uses aids    In the past 6 months, have you been bothered by leaking of urine? no    Mental Health:    In general, how would you rate your overall mental or emotional health? good  PHQ-2 Score:      Do you feel safe in your environment? Yes    Have you ever done Advance Care Planning? (For example, a Health Directive, POLST, or a discussion with a medical provider or your loved ones about your wishes): Yes, advance care planning is on file.    Additional concerns to address?  No    Fall risk:       Cognitive Screening: Not appropriate due to known dementia    Do you have sleep apnea, excessive snoring or daytime drowsiness?: no        PROBLEMS TO ADD ON...  -------------------------------------    Reviewed and updated as needed this visit by clinical staff  Tobacco  Allergies  Meds  Problems             Reviewed and updated as needed this visit by Provider   Allergies  Meds  Problems            Social History     Tobacco Use     Smoking status: Never Smoker     Smokeless tobacco: Never Used   Substance Use Topics     Alcohol use: Yes     Alcohol/week: 0.8 standard drinks     Types: 1 Standard drinks or equivalent per week                           Current providers sharing in care for this patient include:   Patient Care Team:  Rajan Butt MD as PCP - General (Family Practice)  Rajan Butt MD as Assigned PCP    The following health maintenance items are reviewed in Epic and correct as of today:  Health Maintenance   Topic Date Due     ZOSTER IMMUNIZATION (1 of 2) 03/03/1975     DTAP/TDAP/TD IMMUNIZATION (2 - Td) 04/03/2019     MEDICARE ANNUAL WELLNESS VISIT  09/14/2019     TSH W/FREE T4 REFLEX   09/14/2019     FALL RISK ASSESSMENT  09/14/2019     PHQ-2  01/01/2020     INFLUENZA VACCINE (1) 09/01/2020     ADVANCE CARE PLANNING  09/14/2023     Pneumococcal Vaccine: 65+ Years  Completed     Pneumococcal Vaccine: Pediatrics (0 to 5 Years) and At-Risk Patients (6 to 64 Years)  Aged Out     IPV IMMUNIZATION  Aged Out     MENINGITIS IMMUNIZATION  Aged Out     HEPATITIS B IMMUNIZATION  Aged Out     Patient Active Problem List   Diagnosis     Essential hypertension     Mixed hyperlipidemia     Presbyacusis     Disequilibrium     Hypothyroid     Poor short-term memory     FH: CAD (coronary artery disease)     Health Care Home     Pneumonia     Dementia without behavioral disturbance, unspecified dementia type (H)     Past Surgical History:   Procedure Laterality Date     APPENDECTOMY       CATARACT IOL, RT/LT      Cataract IOL RT/LT     EYE SURGERY      retinal hemorrage      THYROIDECTOMY       Thyroidectomy     TONSILLECTOMY & ADENOIDECTOMY       VITRECTOMY PARSPLANA, SECONDARY INTRAOCULAR LENS IMPLANT, COMBINED  9/18/2013    Procedure: COMBINED VITRECTOMY PARSPLANA, SECONDARY INTRAOCULAR LENS IMPLANT;  LEFT 20 GAUGE VITRECTOMY PARSPLANA, EXCHANGE INTRAOCULAR LENS IMPLANT;  Surgeon: Brandon Umanzor MD;  [facility]: [facility]       Social History     Tobacco Use     Smoking status: Never Smoker     Smokeless tobacco: Never Used   Substance Use Topics     Alcohol use: Yes     Alcohol/week: 0.8 standard drinks     Types: 1 Standard drinks or equivalent per week     Family History   Problem Relation Age of Onset     Cancer Mother      Heart Disease Father      C.A.D. Brother      Cancer Brother      C.A.D. Brother              ROS:  Constitutional, HEENT, cardiovascular, pulmonary, GI, , musculoskeletal, neuro, skin, endocrine and psych systems are negative, except as otherwise noted.    OBJECTIVE:   /64   Pulse 74   Temp 98.3  F (36.8  C) (Skin)   Resp 16   Wt 63.5 kg (140 lb)   SpO2 94%   BMI 25.61 kg/m   " Estimated body mass index is 25.61 kg/m  as calculated from the following:    Height as of 9/14/18: 1.575 m (5' 2\").    Weight as of this encounter: 63.5 kg (140 lb).  EXAM:   GENERAL APPEARANCE: healthy, alert and no distress  EYES: Eyes grossly normal to inspection, PERRL and conjunctivae and sclerae normal  HENT: ear canals and TM's normal, nose and mouth without ulcers or lesions, oropharynx clear and oral mucous membranes moist  NECK: no adenopathy, no asymmetry, masses, or scars and thyroid normal to palpation  RESP: lungs clear to auscultation - no rales, rhonchi or wheezes  BREAST: normal without masses, tenderness or nipple discharge and no palpable axillary masses or adenopathy  CV: regular rate and rhythm, normal S1 S2, no S3 or S4, no murmur, click or rub, no peripheral edema and peripheral pulses strong  ABDOMEN: soft, nontender, no hepatosplenomegaly, no masses and bowel sounds normal  MS: no musculoskeletal defects are noted and gait is age appropriate without ataxia  SKIN: no suspicious lesions or rashes  NEURO: Normal strength and tone, sensory exam grossly normal, mentation intact and speech normal  PSYCH: mentation appears normal and affect normal/bright    Diagnostic Test Results:  Labs reviewed in Epic    ASSESSMENT / PLAN:   Lyla was seen today for recheck medication, swelling and cough.    Diagnoses and all orders for this visit:    Other specified hypothyroidism    Dementia without behavioral disturbance, unspecified dementia type (H)    Essential hypertension  -     Comp. Metabolic Panel (14) ([facility])  -     CBC with Diff/Plt ([facility])    Mixed hyperlipidemia    Encounter for Medicare annual wellness exam        Patient has been advised of split billing requirements and indicates understanding: Yes    COUNSELING:  Reviewed preventive health counseling, as reflected in patient instructions       Regular exercise       Healthy diet/nutrition    Estimated body mass index is 25.61 kg/m  as " "calculated from the following:    Height as of 9/14/18: 1.575 m (5' 2\").    Weight as of this encounter: 63.5 kg (140 lb).        She reports that she has never smoked. She has never used smokeless tobacco.    Appropriate preventive services were discussed with this patient, including applicable screening as appropriate for cardiovascular disease, diabetes, osteopenia/osteoporosis, and glaucoma.  As appropriate for age/gender, discussed screening for colorectal cancer, prostate cancer, breast cancer, and cervical cancer. Checklist reviewing preventive services available has been given to the patient.    Reviewed patients plan of care and provided an AVS. The Basic Care Plan (routine screening as documented in Health Maintenance) for Lyla meets the Care Plan requirement. This Care Plan has been established and reviewed with the Patient.    Counseling Resources:  ATP IV Guidelines  Pooled Cohorts Equation Calculator  Breast Cancer Risk Calculator  BRCA-Related Cancer Risk Assessment: FHS-7 Tool  FRAX Risk Assessment  ICSI Preventive Guidelines  Dietary Guidelines for Americans, 2010  USDA's MyPlate  ASA Prophylaxis  Lung CA Screening    Rajan Butt MD  [facility]  "

## 2020-10-01 NOTE — PATIENT INSTRUCTIONS
Patient Education   Personalized Prevention Plan  You are due for the preventive services outlined below.  Your care team is available to assist you in scheduling these services.  If you have already completed any of these items, please share that information with your care team to update in your medical record.  Health Maintenance Due   Topic Date Due     Zoster (Shingles) Vaccine (1 of 2) 03/03/1975     Diptheria Tetanus Pertussis (DTAP/TDAP/TD) Vaccine (2 - Td) 04/03/2019     Annual Wellness Visit  09/14/2019     Thyroid Function Lab  09/14/2019     FALL RISK ASSESSMENT  09/14/2019     PHQ-2  01/01/2020     Flu Vaccine (1) 09/01/2020

## 2020-10-02 LAB
ALBUMIN SERPL-MCNC: 3.9 G/DL (ref 3.5–4.6)
ALBUMIN/GLOB SERPL: 1.6 {RATIO} (ref 1.2–2.2)
ALP SERPL-CCNC: 97 IU/L (ref 39–117)
ALT SERPL-CCNC: 6 IU/L (ref 0–32)
AST SERPL-CCNC: 10 IU/L (ref 0–40)
BILIRUB SERPL-MCNC: <0.2 MG/DL (ref 0–1.2)
BUN SERPL-MCNC: 21 MG/DL (ref 10–36)
BUN/CREATININE RATIO: 18 (ref 12–28)
CALCIUM SERPL-MCNC: 8.9 MG/DL (ref 8.7–10.3)
CHLORIDE SERPLBLD-SCNC: 105 MMOL/L (ref 96–106)
CREAT SERPL-MCNC: 1.2 MG/DL (ref 0.57–1)
EGFR IF AFRICN AM: 44 ML/MIN/1.73
EGFR IF NONAFRICN AM: 39 ML/MIN/1.73
GLOBULIN, TOTAL: 2.5 G/DL (ref 1.5–4.5)
GLUCOSE SERPL-MCNC: 127 MG/DL (ref 65–99)
POTASSIUM SERPL-SCNC: 4.6 MMOL/L (ref 3.5–5.2)
PROT SERPL-MCNC: 6.4 G/DL (ref 6–8.5)
SODIUM SERPL-SCNC: 143 MMOL/L (ref 134–144)
TOTAL CO2: 26 MMOL/L (ref 20–29)
TSH BLD-ACNC: 0.17 UIU/ML (ref 0.45–4.5)

## 2020-12-29 NOTE — PROGRESS NOTES
Medicare G Code Information:    Medicare G-code:  Swallowing  Current Status Modifier : CI: 1-19% impairment  Goal Modifier : CI: 1-19% impairment  Discharge Modifier : CI: 1-19% impairment  Modifier determined by clinical judgment in conjunction with objective data and subjective report.

## 2020-12-29 NOTE — PROGRESS NOTES
Medicare G-code:  Mobility: Walking and Moving Around  Current Status Modifier : CI: 1-19% impairment  Goal Modifier : CI: 1-19% impairment  Discharge Modifier :Not entered as pt not seen for final session.    Modifier determined by clinical judgment in conjunction with objective data and subjective report.

## 2021-01-05 NOTE — PROGRESS NOTES
Medicare G-code:  Self Care  Current Status , Goal ,  Discharge   1 session only, modifier the same for all G-codes.  CJ: 20-39% impairment  Modifier determined by clinical judgment in conjunction with objective data and subjective report.

## 2021-01-18 ENCOUNTER — APPOINTMENT (OUTPATIENT)
Dept: CT IMAGING | Facility: CLINIC | Age: 86
End: 2021-01-18
Attending: EMERGENCY MEDICINE
Payer: MEDICARE

## 2021-01-18 ENCOUNTER — APPOINTMENT (OUTPATIENT)
Dept: GENERAL RADIOLOGY | Facility: CLINIC | Age: 86
End: 2021-01-18
Attending: EMERGENCY MEDICINE
Payer: MEDICARE

## 2021-01-18 ENCOUNTER — HOSPITAL ENCOUNTER (EMERGENCY)
Facility: CLINIC | Age: 86
Discharge: HOME | End: 2021-01-18
Attending: EMERGENCY MEDICINE | Admitting: EMERGENCY MEDICINE
Payer: MEDICARE

## 2021-01-18 VITALS
RESPIRATION RATE: 18 BRPM | SYSTOLIC BLOOD PRESSURE: 147 MMHG | HEIGHT: 63 IN | OXYGEN SATURATION: 99 % | TEMPERATURE: 97.9 F | BODY MASS INDEX: 24.8 KG/M2 | DIASTOLIC BLOOD PRESSURE: 70 MMHG | HEART RATE: 64 BPM

## 2021-01-18 DIAGNOSIS — S61.011A LACERATION OF RIGHT THUMB WITHOUT FOREIGN BODY WITHOUT DAMAGE TO NAIL, INITIAL ENCOUNTER: ICD-10-CM

## 2021-01-18 DIAGNOSIS — S51.011A SKIN TEAR OF RIGHT ELBOW WITHOUT COMPLICATION, INITIAL ENCOUNTER: ICD-10-CM

## 2021-01-18 DIAGNOSIS — R41.0 EPISODE OF CONFUSION: ICD-10-CM
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
## 2021-01-18 LAB
ALBUMIN UR-MCNC: NEGATIVE MG/DL
ANION GAP SERPL CALCULATED.3IONS-SCNC: 3 MMOL/L (ref 3–14)
APPEARANCE UR: CLEAR
BASOPHILS # BLD AUTO: 0.1 10E9/L (ref 0–0.2)
BASOPHILS NFR BLD AUTO: 0.9 %
BILIRUB UR QL STRIP: NEGATIVE
BUN SERPL-MCNC: 25 MG/DL (ref 7–30)
CALCIUM SERPL-MCNC: 9 MG/DL (ref 8.5–10.1)
CHLORIDE SERPL-SCNC: 108 MMOL/L (ref 94–109)
CO2 SERPL-SCNC: 29 MMOL/L (ref 20–32)
COLOR UR AUTO: YELLOW
CREAT SERPL-MCNC: 1.24 MG/DL (ref 0.52–1.04)
DIFFERENTIAL METHOD BLD: ABNORMAL
EOSINOPHIL # BLD AUTO: 0.2 10E9/L (ref 0–0.7)
EOSINOPHIL NFR BLD AUTO: 2.9 %
ERYTHROCYTE [DISTWIDTH] IN BLOOD BY AUTOMATED COUNT: 12.6 % (ref 10–15)
GFR SERPL CREATININE-BSD FRML MDRD: 37 ML/MIN/{1.73_M2}
GLUCOSE SERPL-MCNC: 76 MG/DL (ref 70–99)
GLUCOSE UR STRIP-MCNC: NEGATIVE MG/DL
HCT VFR BLD AUTO: 31.4 % (ref 35–47)
HGB BLD-MCNC: 10.1 G/DL (ref 11.7–15.7)
HGB UR QL STRIP: NEGATIVE
IMM GRANULOCYTES # BLD: 0 10E9/L (ref 0–0.4)
IMM GRANULOCYTES NFR BLD: 0.2 %
INTERPRETATION ECG - MUSE: NORMAL
KETONES UR STRIP-MCNC: NEGATIVE MG/DL
LEUKOCYTE ESTERASE UR QL STRIP: NEGATIVE
LYMPHOCYTES # BLD AUTO: 1.2 10E9/L (ref 0.8–5.3)
LYMPHOCYTES NFR BLD AUTO: 18.1 %
MCH RBC QN AUTO: 30.1 PG (ref 26.5–33)
MCHC RBC AUTO-ENTMCNC: 32.2 G/DL (ref 31.5–36.5)
MCV RBC AUTO: 94 FL (ref 78–100)
MONOCYTES # BLD AUTO: 0.7 10E9/L (ref 0–1.3)
MONOCYTES NFR BLD AUTO: 10.8 %
NEUTROPHILS # BLD AUTO: 4.4 10E9/L (ref 1.6–8.3)
NEUTROPHILS NFR BLD AUTO: 67.1 %
NITRATE UR QL: NEGATIVE
NRBC # BLD AUTO: 0 10*3/UL
NRBC BLD AUTO-RTO: 0 /100
PH UR STRIP: 6.5 PH (ref 5–7)
PLATELET # BLD AUTO: 237 10E9/L (ref 150–450)
POTASSIUM SERPL-SCNC: 4.4 MMOL/L (ref 3.4–5.3)
RBC # BLD AUTO: 3.35 10E12/L (ref 3.8–5.2)
RBC #/AREA URNS AUTO: 1 /HPF (ref 0–2)
SODIUM SERPL-SCNC: 140 MMOL/L (ref 133–144)
SOURCE: NORMAL
SP GR UR STRIP: 1.01 (ref 1–1.03)
UROBILINOGEN UR STRIP-MCNC: 0 MG/DL (ref 0–2)
WBC # BLD AUTO: 6.5 10E9/L (ref 4–11)
WBC #/AREA URNS AUTO: 0 /HPF (ref 0–5)

## 2021-01-18 PROCEDURE — 70450 CT HEAD/BRAIN W/O DYE: CPT | Mod: MG

## 2021-01-18 PROCEDURE — 81001 URINALYSIS AUTO W/SCOPE: CPT | Performed by: EMERGENCY MEDICINE

## 2021-01-18 PROCEDURE — 93005 ELECTROCARDIOGRAM TRACING: CPT

## 2021-01-18 PROCEDURE — 80048 BASIC METABOLIC PNL TOTAL CA: CPT | Performed by: EMERGENCY MEDICINE

## 2021-01-18 PROCEDURE — 99285 EMERGENCY DEPT VISIT HI MDM: CPT | Mod: 25

## 2021-01-18 PROCEDURE — 73130 X-RAY EXAM OF HAND: CPT | Mod: RT

## 2021-01-18 PROCEDURE — 85025 COMPLETE CBC W/AUTO DIFF WBC: CPT | Performed by: EMERGENCY MEDICINE

## 2021-01-18 ASSESSMENT — ENCOUNTER SYMPTOMS
CONFUSION: 1
MYALGIAS: 0
CHILLS: 0
ABDOMINAL PAIN: 0
FEVER: 0
ARTHRALGIAS: 0

## 2021-01-18 NOTE — ED TRIAGE NOTES
Fall - pt lives at assisted living on Saturday - abrasion left thumb - daughter noticed increase confusion yesterday wanting CT scan of her head - pt not on blood thinners

## 2021-01-18 NOTE — ED PROVIDER NOTES
History   Chief Complaint:  Fall     The history is provided by a relative.    Hx limited due to underlying dementia    Lyla Duran is a 89 year old female with history of hyperlipidemia, hypertension, CAD, dementia who presents with fall. The patient's niece notes that she lives independent and has caregivers that check in on her during the day. Two days ago she had received a call from the  of her building and noted that the patient had fell in her bedroom and went to check on her and she was sitting on her bed and that yesterday she had fell again and was not acting herself and had increasing confusion. She denies fever, cough, N/V/D, pain, inability to walk. The patient's niece notes that she does not remember the fall. She does have some pain on her right hand thumb. She denies any other pain. Today patient's mental state is back to normal.     Review of Systems   Constitutional: Negative for chills and fever.   Gastrointestinal: Negative for abdominal pain.   Musculoskeletal: Negative for arthralgias, gait problem and myalgias.   Skin: Negative for rash.   Psychiatric/Behavioral: Positive for behavioral problems and confusion.   ROS limited by dementia    Allergies:  No known drug allergies     Medications:  Hydrochlorothiazide   Levothyroxine   Lisinopril      Past Medical History:    Dysequilibrium   Hyperlipidemia   Poor short term memory  Hypertension   Dementia   Pneumonia   CAD     Past Surgical History:    Appendectomy   Cataract IOL   Eye surgery   Thyroidectomy   Vitrectomy   Adenoidectomy     Family History:    Cancer, mother   Heart disease, father   CAD, brothers   Cancer, brother     Social History:  Smoking status: Never   Alcohol use: never  Drug use: no  PCP: Rajan Butt  Presents to the ED alone    Physical Exam     Patient Vitals for the past 24 hrs:   BP Temp Temp src Pulse Resp SpO2 Height   01/18/21 1250 (!) 147/70 -- -- 64 18 99 % --   01/18/21 1018 (!) 142/58 97.9  F  "(36.6  C) Oral 110 16 98 % 1.6 m (5' 3\")       Physical Exam  VS: Reviewed per above  HENT: Mucous membranes moist, no nuchal rigidity  EYES: sclera anicteric  CV: Rate as noted, regular rhythm.   RESP: Effort normal. Breath sounds are normal bilaterally.  GI: no tenderness/rebound/guarding, not distended.  NEURO: GCS 14 (oriented to self), cranial nerves II through XII are intact, 5 out of 5 strength in all 4 extremities, sensation is intact light touch in all 4 extremities  MSK: No deformity of the extremities.  No pain with passive range of motion of the joints of her extremities.  No midline vertebral tenderness to palpation.  SKIN: Warm and dry, no rashes.  Skin tear to right elbow and superficial laceration to right thumb.  No significant erythema or warmth around these wounds.      Emergency Department Course     ECG:  ECG taken at 1128, ECG read at 1138  Sinus rhythm with marked sinus arrhythmia   Nonspecific ST abnormality  Abnormal ECG  Changes noted as Q wave in V2 is new (possibly lead placement)  Rate 67 bpm. MS interval 124 ms. QRS duration 80 ms. QT/QTc 384/405 ms. P-R-T axes 52 20 56.     Imaging:  XR Right hand:  No evidence of acute fracture involving the thumb. Advanced STT and first carpometacarpal degenerative changes. Advanced DIP degenerative changes. Advance fifth carpometacarpal degenerative changes. Osteopenia.     Reading per radiology      Head CT:  Diffuse cerebral volume loss and cerebral white matter   changes consistent with chronic small vessel ischemic disease. No   evidence for acute intracranial pathology.     Reading per radiology      Laboratory:  CBC: WBC 6.5, HGB 10.1(L),    BMP:, Creatinine: 1.24(H), GFR 37 (L)  UA: normal    Emergency Department Course:  Reviewed:  I reviewed the patient's nursing notes, vitals, past medical records, Care Everywhere.     Assessments:  1115 I performed an assessment and examination of the patient as noted above.     1235 I checked on " and updated the patient. Findings and plan explained to the Patient and caregiver. Patient discharged home with instructions regarding supportive care, medications, and reasons to return. The importance of close follow-up was reviewed.     Disposition:  The patient was discharged to home.       Impression & Plan   Medical Decision Making:  Patient presents to the ER with power of /niece for evaluation of recent confusion in the setting of recent fall.  Per chart review she has history of dementia.  On arrival vital signs are reassuring.  On exam the only injuries appear to be skin tear/superficial laceration to the right elbow and right thumb respectively.  In discussion with power of /daughter, CT imaging of the brain and urinalysis and basic labs would be in line with goals of care, but patient would not necessarily want further testing or hospital admission.  Patient's niece felt that patient could safely return to her independent living environment (with current assistive cares) if evaluation was negative in the ER today.  Fortunately CT of the head did not show any intracranial hemorrhage or skull fracture.  Additionally it sounds as though her mental status is back to baseline.  No evidence of UTI.  Function is impaired but similar to value from a few months ago.  I encouraged lots of liquids to stay hydrated.  No clinical findings of meningitis or skin or soft tissue infection.  No history to suggest underlying pneumonia or Covid.  Abdomen is soft and nontender without peritoneal signs to suggest sinister intra-abdominal process.  I encouraged primary care follow-up.  Return precautions discussed prior to discharge.      Diagnosis:    ICD-10-CM    1. Episode of confusion  R41.0    2. Fall, initial encounter  W19.XXXA    3. Skin tear of right elbow without complication, initial encounter  S51.011A    4. Laceration of right thumb without foreign body without damage to nail, initial encounter   S61.011A        Scribe Disclosure:  I, Lashae Noble, am serving as a scribe at 11:09 AM on 1/18/2021 to document services personally performed by Alfredo Coe MD based on my observations and the provider's statements to me.              Alfredo Coe MD  01/18/21 [id number]

## 2021-01-18 NOTE — DISCHARGE INSTRUCTIONS
Return for worsening confusion, fevers, redness of wounds, new concerns. Push lots of fluids and follow up with primary care. Apply antibiotic ointment to thumb and elbow wounds.

## 2021-03-01 DIAGNOSIS — I10 ESSENTIAL HYPERTENSION: ICD-10-CM

## 2021-03-01 DIAGNOSIS — E03.9 HYPOTHYROIDISM, UNSPECIFIED TYPE: ICD-10-CM

## 2021-03-01 RX ORDER — LISINOPRIL 20 MG/1
TABLET ORAL
Qty: 90 TABLET | Refills: 1 | Status: SHIPPED | OUTPATIENT
Start: 2021-03-01 | End: 2021-09-13

## 2021-03-01 RX ORDER — LEVOTHYROXINE SODIUM 25 UG/1
TABLET ORAL
Qty: 90 TABLET | Refills: 1 | Status: SHIPPED | OUTPATIENT
Start: 2021-03-01 | End: 2021-09-13

## 2021-09-13 DIAGNOSIS — E03.9 HYPOTHYROIDISM, UNSPECIFIED TYPE: ICD-10-CM

## 2021-09-13 DIAGNOSIS — I10 ESSENTIAL HYPERTENSION: ICD-10-CM

## 2021-09-13 RX ORDER — LISINOPRIL 20 MG/1
TABLET ORAL
Qty: 90 TABLET | Refills: 1 | Status: SHIPPED | OUTPATIENT
Start: 2021-09-13 | End: 2021-10-25

## 2021-09-13 RX ORDER — LEVOTHYROXINE SODIUM 25 UG/1
TABLET ORAL
Qty: 90 TABLET | Refills: 1 | Status: SHIPPED | OUTPATIENT
Start: 2021-09-13 | End: 2021-10-25

## 2021-09-13 NOTE — TELEPHONE ENCOUNTER
Levothyroxine & Lisinopril  LOV 10/1/2020    DUE for CPX and fasting labs    LAST TSH 10/1/2020 = 0.168    BP Readings from Last 3 Encounters:   01/18/21 (!) 147/70   10/01/20 122/64   09/14/18 120/64

## 2021-09-17 NOTE — TELEPHONE ENCOUNTER
[facility] due for an appointment and labs in October    Donte Richey,   [facility]  [phone]

## 2021-10-22 NOTE — PATIENT INSTRUCTIONS
Personalized Prevention Plan  You are due for the preventive services outlined below.  Your care team is available to assist you in scheduling these services.  If you have already completed any of these items, please share that information with your care team to update in your medical record.  Health Maintenance Due   Topic Date Due     Zoster (Shingles) Vaccine (1 of 2) Never done     Diptheria Tetanus Pertussis (DTAP/TDAP/TD) Vaccine (2 - Td or Tdap) 04/03/2019     FALL RISK ASSESSMENT  09/14/2019     PHQ-2  01/01/2021     Thyroid Function Lab  10/01/2021

## 2021-10-22 NOTE — PROGRESS NOTES
SUBJECTIVE:   Lyla Duran is a 89 year old female who presents for Preventive Visit.    Essential Hypertension   Remains well controlled when checked out of clinic.   she has not experienced any significant side effects from medications for hypertension.    NO active cardiac complaints or symptoms with exercise.  Current medications for treatment:  ACE inhibitors (Lisinopril, Ramipril,Captopril,Benazepril) and Diuretic thiazide (chlorthalidone, hydrochlorothiazide, indapamide    Reviewed last 6 BP readings in chart:  BP Readings from Last 6 Encounters:   10/25/21 (!) 174/75   01/18/21 (!) 147/70   10/01/20 122/64   09/14/18 120/64   05/25/18 118/68   05/23/18 148/57       History of hypothyroidism.  On replacement therapy.  She has not experienced any significant side effects of this medication.   The patient denies of fatigue, weight changes, heat/cold intolerance, hair changes, nail changes, bowel changes.    The last 5 available TSH values from [facility] reference lab, [facility]:  TSH   Date Value Ref Range Status   10/01/2020 0.168 (L) 0.450 - 4.500 uIU/mL Final   09/14/2018 0.078 (L) 0.450 - 4.500 uIU/mL Final   09/12/2017 0.202 (L) 0.450 - 4.500 uIU/mL Final       Any TSH results from the [facility] lab system:  No results found for: TSH, TSH    Has known memory problems due to Alzheimers vs multi-infarct dementia.   Has been on medications for this.   Repots no side effects from these meds.   Family reports no acute changes, but still has persistent short term memory loss and impaired concentration.   They feel that he is currently not a danger to themselves and that they are in a safe situation.   The patient no longer drives.   Here with niece who helps greatly    Patient has been advised of split billing requirements and indicates understanding: Yes  Are you in the first 12 months of your Medicare Part B coverage?  No    Physical Health:    In general, how would you rate your overall physical health? good    Outside  "of work, how many days during the week do you exercise? none    Outside of work, approximately how many minutes a day do you exercise?less than 15 minutes    If you drink alcohol do you typically have >3 drinks per day or >7 drinks per week? No    Do you usually eat at least 4 servings of fruit and vegetables a day, include whole grains & fiber and avoid regularly eating high fat or \"junk\" foods? Yes    Do you have any problems taking medications regularly?  No    Do you have any side effects from medications? none    Needs assistance for the following daily activities: no assistance needed    Which of the following safety concerns are present in your home?  none identified     Hearing impairment: Yes, Wears Hearing aids    In the past 6 months, have you been bothered by leaking of urine? no    Mental Health:    In general, how would you rate your overall mental or emotional health? fair  PHQ-2 Score:      Do you feel safe in your environment? Yes    Have you ever done Advance Care Planning? (For example, a Health Directive, POLST, or a discussion with a medical provider or your loved ones about your wishes): Yes, advance care planning is on file.    Additional concerns to address?      Fall risk:  Fallen 2 or more times in the past year?: No  Any fall with injury in the past year?: No    Cognitive Screenin) Repeat 3 items (Leader, Season, Table)    2) Clock draw: ABNORMAL didn't write in the numbers, placed dots but out side of clock and not in a circular motion  3) 3 item recall: Recalls NO objects   Results: 0 items recalled: PROBABLE COGNITIVE IMPAIRMENT, **INFORM PROVIDER**    Mini-CogTM Copyright [employer]. Licensed by the author for use in [facility]; reprinted with permission ([email or web address]). All rights reserved.      Do you have sleep apnea, excessive snoring or daytime drowsiness?: no      Reviewed and updated as needed this visit by clinical staff                 Reviewed and updated as " "needed this visit by Provider                Social History     Tobacco Use     Smoking status: Never Smoker     Smokeless tobacco: Never Used   Substance Use Topics     Alcohol use: Yes     Alcohol/week: 0.8 standard drinks     Types: 1 Standard drinks or equivalent per week                           Current providers sharing in care for this patient include:   Patient Care Team:  Rajan Butt MD as PCP - General (Family Practice)  Rajan Butt MD as Assigned PCP    The following health maintenance items are reviewed in Epic and correct as of today:  Health Maintenance   Topic Date Due     ZOSTER IMMUNIZATION (1 of 2) Never done     DTAP/TDAP/TD IMMUNIZATION (2 - Td or Tdap) 04/03/2019     FALL RISK ASSESSMENT  09/14/2019     PHQ-2  01/01/2021     TSH W/FREE T4 REFLEX  10/01/2021     MEDICARE ANNUAL WELLNESS VISIT  10/25/2022     ADVANCE CARE PLANNING  10/01/2025     INFLUENZA VACCINE  Completed     Pneumococcal Vaccine: 65+ Years  Completed     COVID-19 Vaccine  Completed     IPV IMMUNIZATION  Aged Out     MENINGITIS IMMUNIZATION  Aged Out     HEPATITIS B IMMUNIZATION  Aged Out     Lab work is in process      ROS:  Constitutional, HEENT, cardiovascular, pulmonary, GI, , musculoskeletal, neuro, skin, endocrine and psych systems are negative, except as otherwise noted.    OBJECTIVE:   There were no vitals taken for this visit. Estimated body mass index is 24.8 kg/m  as calculated from the following:    Height as of 1/18/21: 1.6 m (5' 3\").    Weight as of 10/1/20: 63.5 kg (140 lb).  EXAM:   GENERAL APPEARANCE: healthy, alert and no distress  EYES: Eyes grossly normal to inspection, PERRL and conjunctivae and sclerae normal  HENT: ear canals and TM's normal, nose and mouth without ulcers or lesions, oropharynx clear and oral mucous membranes moist  NECK: no adenopathy, no asymmetry, masses, or scars and thyroid normal to palpation  RESP: lungs clear to auscultation - no rales, rhonchi or " "wheezes  BREAST: normal without masses, tenderness or nipple discharge and no palpable axillary masses or adenopathy  CV: regular rate and rhythm, normal S1 S2, no S3 or S4, no murmur, click or rub, no peripheral edema and peripheral pulses strong  ABDOMEN: soft, nontender, no hepatosplenomegaly, no masses and bowel sounds normal  MS: no musculoskeletal defects are noted and gait is age appropriate without ataxia  SKIN: no suspicious lesions or rashes  NEURO: Normal strength and tone, sensory exam grossly normal, speech normal and poor short term memory   PSYCH: mentation appears normal and affect normal/bright    Diagnostic Test Results:  Labs reviewed in Epic    ASSESSMENT / PLAN:   Lyla was seen today for physical.    Diagnoses and all orders for this visit:    Encounter for Medicare annual wellness exam    Essential hypertension  -     VT ART PRESS WAVEFORM ANALYS CENTRAL ART PRESSURE  -     hydrochlorothiazide (HYDRODIURIL) 25 MG tablet; Take 1 tablet (25 mg) by mouth every morning  -     lisinopril (ZESTRIL) 20 MG tablet; TAKE ONE (1) TABLET BY MOUTH DAILY ...THANK YOU...  -     Comp. Metabolic Panel (14) ([facility])  -     CBC with Diff/Plt ([facility])    Hypothyroidism, unspecified type  -     levothyroxine (SYNTHROID/LEVOTHROID) 25 MCG tablet; TAKE ONE (1) TABLET BY MOUTH DAILY ...THANK YOU...  -     TSH ([facility])    Stage 3a chronic kidney disease (H)    Dementia without behavioral disturbance, unspecified dementia type (H)        Patient has been advised of split billing requirements and indicates understanding: Yes    COUNSELING:  Reviewed preventive health counseling, as reflected in patient instructions       Vision screening       Hearing screening    Estimated body mass index is 24.8 kg/m  as calculated from the following:    Height as of 1/18/21: 1.6 m (5' 3\").    Weight as of 10/1/20: 63.5 kg (140 lb).        She reports that she has never smoked. She has never used smokeless tobacco.    Appropriate " preventive services were discussed with this patient, including applicable screening as appropriate for cardiovascular disease, diabetes, osteopenia/osteoporosis, and glaucoma.  As appropriate for age/gender, discussed screening for colorectal cancer, prostate cancer, breast cancer, and cervical cancer. Checklist reviewing preventive services available has been given to the patient.    Reviewed patients plan of care and provided an AVS. The Basic Care Plan (routine screening as documented in Health Maintenance) for Lyla meets the Care Plan requirement. This Care Plan has been established and reviewed with the Patient.    Counseling Resources:  ATP IV Guidelines  Pooled Cohorts Equation Calculator  Breast Cancer Risk Calculator  BRCA-Related Cancer Risk Assessment: FHS-7 Tool  FRAX Risk Assessment  ICSI Preventive Guidelines  Dietary Guidelines for Americans, 2010  USDA's MyPlate  ASA Prophylaxis  Lung CA Screening    Rajan Butt MD  [facility]

## 2021-10-25 ENCOUNTER — OFFICE VISIT (OUTPATIENT)
Dept: FAMILY MEDICINE | Facility: CLINIC | Age: 86
End: 2021-10-25

## 2021-10-25 VITALS
DIASTOLIC BLOOD PRESSURE: 75 MMHG | OXYGEN SATURATION: 97 % | SYSTOLIC BLOOD PRESSURE: 174 MMHG | HEIGHT: 62 IN | WEIGHT: 133 LBS | BODY MASS INDEX: 24.48 KG/M2 | HEART RATE: 74 BPM | RESPIRATION RATE: 18 BRPM

## 2021-10-25 DIAGNOSIS — F03.90 DEMENTIA WITHOUT BEHAVIORAL DISTURBANCE, UNSPECIFIED DEMENTIA TYPE: ICD-10-CM

## 2021-10-25 DIAGNOSIS — Z00.00 ENCOUNTER FOR MEDICARE ANNUAL WELLNESS EXAM: Primary | ICD-10-CM

## 2021-10-25 DIAGNOSIS — I10 ESSENTIAL HYPERTENSION: ICD-10-CM

## 2021-10-25 DIAGNOSIS — E03.9 HYPOTHYROIDISM, UNSPECIFIED TYPE: ICD-10-CM

## 2021-10-25 DIAGNOSIS — N18.31 STAGE 3A CHRONIC KIDNEY DISEASE (H): ICD-10-CM

## 2021-10-25 PROBLEM — N18.30 CHRONIC KIDNEY DISEASE, STAGE 3 (H): Status: ACTIVE | Noted: 2021-10-25

## 2021-10-25 LAB
% GRANULOCYTES: 77.4 % (ref 42.2–75.2)
HCT VFR BLD AUTO: 29.8 % (ref 35–46)
HEMOGLOBIN: 10.7 G/DL (ref 11.8–15.5)
LYMPHOCYTES NFR BLD AUTO: 17.3 % (ref 20.5–51.1)
MCH RBC QN AUTO: 30.8 PG (ref 27–31)
MCHC RBC AUTO-ENTMCNC: 36 G/DL (ref 33–37)
MCV RBC AUTO: 85.7 FL (ref 80–100)
MONOCYTES NFR BLD AUTO: 5.3 % (ref 1.7–9.3)
PLATELET # BLD AUTO: 245 K/UL (ref 140–450)
RBC # BLD AUTO: 3.47 X10/CMM (ref 3.7–5.2)
WBC # BLD AUTO: 5.7 X10/CMM (ref 3.8–11)

## 2021-10-25 PROCEDURE — 99214 OFFICE O/P EST MOD 30 MIN: CPT | Mod: 25 | Performed by: FAMILY MEDICINE

## 2021-10-25 PROCEDURE — G0439 PPPS, SUBSEQ VISIT: HCPCS | Performed by: FAMILY MEDICINE

## 2021-10-25 PROCEDURE — 85025 COMPLETE CBC W/AUTO DIFF WBC: CPT | Performed by: FAMILY MEDICINE

## 2021-10-25 PROCEDURE — 93050 ART PRESSURE WAVEFORM ANALYS: CPT | Performed by: FAMILY MEDICINE

## 2021-10-25 PROCEDURE — 36415 COLL VENOUS BLD VENIPUNCTURE: CPT | Performed by: FAMILY MEDICINE

## 2021-10-25 RX ORDER — LISINOPRIL 20 MG/1
TABLET ORAL
Qty: 90 TABLET | Refills: 3 | Status: SHIPPED | OUTPATIENT
Start: 2021-10-25 | End: 2022-09-28

## 2021-10-25 RX ORDER — LEVOTHYROXINE SODIUM 25 UG/1
TABLET ORAL
Qty: 90 TABLET | Refills: 3 | Status: SHIPPED | OUTPATIENT
Start: 2021-10-25 | End: 2022-03-27

## 2021-10-25 RX ORDER — HYDROCHLOROTHIAZIDE 25 MG/1
25 TABLET ORAL EVERY MORNING
Qty: 90 TABLET | Refills: 3 | Status: ON HOLD | OUTPATIENT
Start: 2021-10-25 | End: 2022-03-28

## 2021-10-25 ASSESSMENT — MIFFLIN-ST. JEOR: SCORE: 946.53

## 2021-10-26 LAB
ALBUMIN SERPL-MCNC: 3.8 G/DL (ref 3.5–4.6)
ALBUMIN/GLOB SERPL: 1.5 {RATIO} (ref 1.2–2.2)
ALP SERPL-CCNC: 89 IU/L (ref 44–121)
ALT SERPL-CCNC: 6 IU/L (ref 0–32)
AST SERPL-CCNC: 11 IU/L (ref 0–40)
BILIRUB SERPL-MCNC: 0.5 MG/DL (ref 0–1.2)
BUN SERPL-MCNC: 21 MG/DL (ref 10–36)
BUN/CREATININE RATIO: 16 (ref 12–28)
CALCIUM SERPL-MCNC: 8.9 MG/DL (ref 8.7–10.3)
CHLORIDE SERPLBLD-SCNC: 104 MMOL/L (ref 96–106)
CREAT SERPL-MCNC: 1.34 MG/DL (ref 0.57–1)
EGFR IF AFRICN AM: 39 ML/MIN/1.73
EGFR IF NONAFRICN AM: 33 ML/MIN/1.73
GLOBULIN, TOTAL: 2.5 G/DL (ref 1.5–4.5)
GLUCOSE SERPL-MCNC: 85 MG/DL (ref 65–99)
POTASSIUM SERPL-SCNC: 4.5 MMOL/L (ref 3.5–5.2)
PROT SERPL-MCNC: 6.3 G/DL (ref 6–8.5)
SODIUM SERPL-SCNC: 140 MMOL/L (ref 134–144)
TOTAL CO2: 24 MMOL/L (ref 20–29)
TSH BLD-ACNC: 0.09 UIU/ML (ref 0.45–4.5)

## 2021-10-29 ENCOUNTER — TELEPHONE (OUTPATIENT)
Dept: FAMILY MEDICINE | Facility: CLINIC | Age: 86
End: 2021-10-29

## 2021-10-29 NOTE — TELEPHONE ENCOUNTER
----- Message from Rajan Butt MD sent at 10/28/2021  5:29 PM CDT -----  Can we let her know she doesn't need her thyroid medication any more.  KN

## 2021-10-29 NOTE — TELEPHONE ENCOUNTER
Called Chantale Roland (niece/POA) with results. Thyroid labs indicate no longer needs levothyroxine and other labs stable. Chantale verbalizes understanding and will remove levothyroxine from her meds.   Carolina Menchaca RN

## 2022-03-27 ENCOUNTER — APPOINTMENT (OUTPATIENT)
Dept: CT IMAGING | Facility: CLINIC | Age: 87
End: 2022-03-27
Attending: EMERGENCY MEDICINE
Payer: MEDICARE

## 2022-03-27 ENCOUNTER — HOSPITAL ENCOUNTER (OUTPATIENT)
Facility: CLINIC | Age: 87
Setting detail: OBSERVATION
Discharge: HOME | End: 2022-03-28
Attending: EMERGENCY MEDICINE | Admitting: STUDENT IN AN ORGANIZED HEALTH CARE EDUCATION/TRAINING PROGRAM
Payer: MEDICARE

## 2022-03-27 DIAGNOSIS — R11.2 NAUSEA VOMITING AND DIARRHEA: ICD-10-CM

## 2022-03-27 DIAGNOSIS — R19.7 NAUSEA VOMITING AND DIARRHEA: ICD-10-CM

## 2022-03-27 DIAGNOSIS — F03.90 DEMENTIA WITHOUT BEHAVIORAL DISTURBANCE, UNSPECIFIED DEMENTIA TYPE: Primary | ICD-10-CM

## 2022-03-27 LAB
ALBUMIN SERPL-MCNC: 3.2 G/DL (ref 3.4–5)
ALBUMIN UR-MCNC: NEGATIVE MG/DL
ALP SERPL-CCNC: 68 U/L (ref 40–150)
ALT SERPL W P-5'-P-CCNC: 15 U/L (ref 0–50)
ANION GAP SERPL CALCULATED.3IONS-SCNC: 6 MMOL/L (ref 3–14)
APPEARANCE UR: CLEAR
AST SERPL W P-5'-P-CCNC: 19 U/L (ref 0–45)
ATRIAL RATE - MUSE: 71 BPM
BACTERIA #/AREA URNS HPF: ABNORMAL /HPF
BASOPHILS # BLD AUTO: 0 10E3/UL (ref 0–0.2)
BASOPHILS NFR BLD AUTO: 0 %
BILIRUB SERPL-MCNC: 0.3 MG/DL (ref 0.2–1.3)
BILIRUB UR QL STRIP: NEGATIVE
BUN SERPL-MCNC: 27 MG/DL (ref 7–30)
CALCIUM SERPL-MCNC: 8.3 MG/DL (ref 8.5–10.1)
CHLORIDE BLD-SCNC: 105 MMOL/L (ref 94–109)
CO2 SERPL-SCNC: 26 MMOL/L (ref 20–32)
COLOR UR AUTO: ABNORMAL
CREAT SERPL-MCNC: 1.32 MG/DL (ref 0.52–1.04)
DIASTOLIC BLOOD PRESSURE - MUSE: NORMAL MMHG
EOSINOPHIL # BLD AUTO: 0.1 10E3/UL (ref 0–0.7)
EOSINOPHIL NFR BLD AUTO: 2 %
ERYTHROCYTE [DISTWIDTH] IN BLOOD BY AUTOMATED COUNT: 12.6 % (ref 10–15)
GFR SERPL CREATININE-BSD FRML MDRD: 37 ML/MIN/1.73M2
GLUCOSE BLD-MCNC: 93 MG/DL (ref 70–99)
GLUCOSE BLDC GLUCOMTR-MCNC: 96 MG/DL (ref 70–99)
GLUCOSE UR STRIP-MCNC: NEGATIVE MG/DL
HCT VFR BLD AUTO: 31.2 % (ref 35–47)
HGB BLD-MCNC: 10.1 G/DL (ref 11.7–15.7)
HGB UR QL STRIP: NEGATIVE
IMM GRANULOCYTES # BLD: 0 10E3/UL
IMM GRANULOCYTES NFR BLD: 0 %
INTERPRETATION ECG - MUSE: NORMAL
KETONES UR STRIP-MCNC: NEGATIVE MG/DL
LEUKOCYTE ESTERASE UR QL STRIP: ABNORMAL
LIPASE SERPL-CCNC: 133 U/L (ref 73–393)
LYMPHOCYTES # BLD AUTO: 1.4 10E3/UL (ref 0.8–5.3)
LYMPHOCYTES NFR BLD AUTO: 24 %
MCH RBC QN AUTO: 30.2 PG (ref 26.5–33)
MCHC RBC AUTO-ENTMCNC: 32.4 G/DL (ref 31.5–36.5)
MCV RBC AUTO: 93 FL (ref 78–100)
MONOCYTES # BLD AUTO: 0.7 10E3/UL (ref 0–1.3)
MONOCYTES NFR BLD AUTO: 12 %
NEUTROPHILS # BLD AUTO: 3.7 10E3/UL (ref 1.6–8.3)
NEUTROPHILS NFR BLD AUTO: 62 %
NITRATE UR QL: NEGATIVE
NRBC # BLD AUTO: 0 10E3/UL
NRBC BLD AUTO-RTO: 0 /100
P AXIS - MUSE: NORMAL DEGREES
PH UR STRIP: 5 [PH] (ref 5–7)
PLATELET # BLD AUTO: 197 10E3/UL (ref 150–450)
POTASSIUM BLD-SCNC: 3.6 MMOL/L (ref 3.4–5.3)
PR INTERVAL - MUSE: NORMAL MS
PROT SERPL-MCNC: 6.7 G/DL (ref 6.8–8.8)
QRS DURATION - MUSE: 72 MS
QT - MUSE: 390 MS
QTC - MUSE: 423 MS
R AXIS - MUSE: -12 DEGREES
RBC # BLD AUTO: 3.34 10E6/UL (ref 3.8–5.2)
RBC URINE: <1 /HPF
SARS-COV-2 RNA RESP QL NAA+PROBE: NEGATIVE
SODIUM SERPL-SCNC: 137 MMOL/L (ref 133–144)
SP GR UR STRIP: 1.01 (ref 1–1.03)
SQUAMOUS EPITHELIAL: <1 /HPF
SYSTOLIC BLOOD PRESSURE - MUSE: NORMAL MMHG
T AXIS - MUSE: 41 DEGREES
UROBILINOGEN UR STRIP-MCNC: NORMAL MG/DL
VENTRICULAR RATE- MUSE: 71 BPM
WBC # BLD AUTO: 5.8 10E3/UL (ref 4–11)
WBC URINE: 3 /HPF

## 2022-03-27 PROCEDURE — 80053 COMPREHEN METABOLIC PANEL: CPT | Performed by: EMERGENCY MEDICINE

## 2022-03-27 PROCEDURE — 85025 COMPLETE CBC W/AUTO DIFF WBC: CPT | Performed by: EMERGENCY MEDICINE

## 2022-03-27 PROCEDURE — C9803 HOPD COVID-19 SPEC COLLECT: HCPCS

## 2022-03-27 PROCEDURE — G0378 HOSPITAL OBSERVATION PER HR: HCPCS

## 2022-03-27 PROCEDURE — G1004 CDSM NDSC: HCPCS

## 2022-03-27 PROCEDURE — 93005 ELECTROCARDIOGRAM TRACING: CPT

## 2022-03-27 PROCEDURE — 83690 ASSAY OF LIPASE: CPT | Performed by: EMERGENCY MEDICINE

## 2022-03-27 PROCEDURE — 250N000011 HC RX IP 250 OP 636: Performed by: EMERGENCY MEDICINE

## 2022-03-27 PROCEDURE — 258N000003 HC RX IP 258 OP 636: Performed by: STUDENT IN AN ORGANIZED HEALTH CARE EDUCATION/TRAINING PROGRAM

## 2022-03-27 PROCEDURE — 96361 HYDRATE IV INFUSION ADD-ON: CPT

## 2022-03-27 PROCEDURE — 36415 COLL VENOUS BLD VENIPUNCTURE: CPT | Performed by: EMERGENCY MEDICINE

## 2022-03-27 PROCEDURE — 96375 TX/PRO/DX INJ NEW DRUG ADDON: CPT

## 2022-03-27 PROCEDURE — U0003 INFECTIOUS AGENT DETECTION BY NUCLEIC ACID (DNA OR RNA); SEVERE ACUTE RESPIRATORY SYNDROME CORONAVIRUS 2 (SARS-COV-2) (CORONAVIRUS DISEASE [COVID-19]), AMPLIFIED PROBE TECHNIQUE, MAKING USE OF HIGH THROUGHPUT TECHNOLOGIES AS DESCRIBED BY CMS-2020-01-R: HCPCS | Performed by: EMERGENCY MEDICINE

## 2022-03-27 PROCEDURE — 250N000013 HC RX MED GY IP 250 OP 250 PS 637: Performed by: STUDENT IN AN ORGANIZED HEALTH CARE EDUCATION/TRAINING PROGRAM

## 2022-03-27 PROCEDURE — 99285 EMERGENCY DEPT VISIT HI MDM: CPT | Mod: 25

## 2022-03-27 PROCEDURE — 99220 PR INITIAL OBSERVATION CARE,LEVEL III: CPT | Performed by: STUDENT IN AN ORGANIZED HEALTH CARE EDUCATION/TRAINING PROGRAM

## 2022-03-27 PROCEDURE — 96374 THER/PROPH/DIAG INJ IV PUSH: CPT

## 2022-03-27 PROCEDURE — 258N000003 HC RX IP 258 OP 636: Performed by: EMERGENCY MEDICINE

## 2022-03-27 PROCEDURE — 82962 GLUCOSE BLOOD TEST: CPT

## 2022-03-27 PROCEDURE — 81001 URINALYSIS AUTO W/SCOPE: CPT | Performed by: EMERGENCY MEDICINE

## 2022-03-27 RX ORDER — PROCHLORPERAZINE MALEATE 5 MG
5 TABLET ORAL EVERY 6 HOURS PRN
Status: DISCONTINUED | OUTPATIENT
Start: 2022-03-27 | End: 2022-03-28 | Stop reason: HOSPADM

## 2022-03-27 RX ORDER — ONDANSETRON 2 MG/ML
4 INJECTION INTRAMUSCULAR; INTRAVENOUS EVERY 6 HOURS PRN
Status: DISCONTINUED | OUTPATIENT
Start: 2022-03-27 | End: 2022-03-28 | Stop reason: HOSPADM

## 2022-03-27 RX ORDER — PROCHLORPERAZINE 25 MG
12.5 SUPPOSITORY, RECTAL RECTAL EVERY 12 HOURS PRN
Status: DISCONTINUED | OUTPATIENT
Start: 2022-03-27 | End: 2022-03-28 | Stop reason: HOSPADM

## 2022-03-27 RX ORDER — BENZTROPINE MESYLATE 1 MG/1
1 TABLET ORAL 3 TIMES DAILY PRN
Status: DISCONTINUED | OUTPATIENT
Start: 2022-03-27 | End: 2022-03-28 | Stop reason: HOSPADM

## 2022-03-27 RX ORDER — ACETAMINOPHEN 650 MG/1
650 SUPPOSITORY RECTAL EVERY 6 HOURS PRN
Status: DISCONTINUED | OUTPATIENT
Start: 2022-03-27 | End: 2022-03-28 | Stop reason: HOSPADM

## 2022-03-27 RX ORDER — HYDRALAZINE HYDROCHLORIDE 20 MG/ML
5 INJECTION INTRAMUSCULAR; INTRAVENOUS EVERY 4 HOURS PRN
Status: DISCONTINUED | OUTPATIENT
Start: 2022-03-27 | End: 2022-03-28 | Stop reason: HOSPADM

## 2022-03-27 RX ORDER — ONDANSETRON 2 MG/ML
4 INJECTION INTRAMUSCULAR; INTRAVENOUS EVERY 30 MIN PRN
Status: DISCONTINUED | OUTPATIENT
Start: 2022-03-27 | End: 2022-03-27

## 2022-03-27 RX ORDER — SODIUM CHLORIDE 9 MG/ML
INJECTION, SOLUTION INTRAVENOUS CONTINUOUS
Status: DISCONTINUED | OUTPATIENT
Start: 2022-03-27 | End: 2022-03-28 | Stop reason: HOSPADM

## 2022-03-27 RX ORDER — LANOLIN ALCOHOL/MO/W.PET/CERES
3 CREAM (GRAM) TOPICAL AT BEDTIME
Status: DISCONTINUED | OUTPATIENT
Start: 2022-03-27 | End: 2022-03-28 | Stop reason: HOSPADM

## 2022-03-27 RX ORDER — LISINOPRIL 20 MG/1
20 TABLET ORAL DAILY
Status: DISCONTINUED | OUTPATIENT
Start: 2022-03-28 | End: 2022-03-28 | Stop reason: HOSPADM

## 2022-03-27 RX ORDER — LIDOCAINE 40 MG/G
CREAM TOPICAL
Status: DISCONTINUED | OUTPATIENT
Start: 2022-03-27 | End: 2022-03-28 | Stop reason: HOSPADM

## 2022-03-27 RX ORDER — LORAZEPAM 2 MG/ML
0.5 INJECTION INTRAMUSCULAR ONCE
Status: COMPLETED | OUTPATIENT
Start: 2022-03-27 | End: 2022-03-27

## 2022-03-27 RX ORDER — LORAZEPAM 2 MG/ML
0.5 INJECTION INTRAMUSCULAR ONCE
Status: DISCONTINUED | OUTPATIENT
Start: 2022-03-27 | End: 2022-03-27

## 2022-03-27 RX ORDER — HALOPERIDOL 5 MG/ML
2.5 INJECTION INTRAMUSCULAR ONCE
Status: COMPLETED | OUTPATIENT
Start: 2022-03-27 | End: 2022-03-27

## 2022-03-27 RX ORDER — ACETAMINOPHEN 325 MG/1
650 TABLET ORAL EVERY 6 HOURS PRN
Status: DISCONTINUED | OUTPATIENT
Start: 2022-03-27 | End: 2022-03-28 | Stop reason: HOSPADM

## 2022-03-27 RX ORDER — ONDANSETRON 4 MG/1
4 TABLET, ORALLY DISINTEGRATING ORAL EVERY 6 HOURS PRN
Status: DISCONTINUED | OUTPATIENT
Start: 2022-03-27 | End: 2022-03-28 | Stop reason: HOSPADM

## 2022-03-27 RX ORDER — LIDOCAINE 40 MG/G
CREAM TOPICAL
Status: DISCONTINUED | OUTPATIENT
Start: 2022-03-27 | End: 2022-03-27

## 2022-03-27 RX ADMIN — QUETIAPINE 12.5 MG: 25 TABLET, FILM COATED ORAL at 23:18

## 2022-03-27 RX ADMIN — HALOPERIDOL LACTATE 2.5 MG: 5 INJECTION, SOLUTION INTRAMUSCULAR at 18:34

## 2022-03-27 RX ADMIN — LORAZEPAM 0.5 MG: 2 INJECTION INTRAMUSCULAR; INTRAVENOUS at 18:02

## 2022-03-27 RX ADMIN — LORAZEPAM 0.5 MG: 2 INJECTION INTRAMUSCULAR; INTRAVENOUS at 18:15

## 2022-03-27 RX ADMIN — SODIUM CHLORIDE: 9 INJECTION, SOLUTION INTRAVENOUS at 19:48

## 2022-03-27 RX ADMIN — ONDANSETRON 4 MG: 2 INJECTION INTRAMUSCULAR; INTRAVENOUS at 12:11

## 2022-03-27 RX ADMIN — QUETIAPINE 12.5 MG: 25 TABLET, FILM COATED ORAL at 17:10

## 2022-03-27 RX ADMIN — SODIUM CHLORIDE 1000 ML: 9 INJECTION, SOLUTION INTRAVENOUS at 12:07

## 2022-03-27 ASSESSMENT — ENCOUNTER SYMPTOMS
VOMITING: 1
CONFUSION: 1
ABDOMINAL PAIN: 1
APPETITE CHANGE: 0
DIARRHEA: 1
SLEEP DISTURBANCE: 1
FEVER: 0

## 2022-03-27 NOTE — ED NOTES
[facility]  ED Nurse Handoff Report    ED Chief complaint: Nausea, Vomiting, & Diarrhea      ED Diagnosis:   Final diagnoses:   Nausea vomiting and diarrhea       Code Status: not addressed   Allergies: No Known Allergies    Patient Story:Pt here with family. She has had 3 days of nausea vomiting and diarrhea. She has some memory issues at baseline. She has been more confused than baseline for the past week. Patient didn't sleep at all last night. She has had multiple episodes of wattery stools this AM.    Focused Assessment:  No vomiting or diarrhea at this time, frequent repetative questions , disoriented,   Treatments and/or interventions provided: NS   Patient's response to treatments and/or interventions:     To be done/followed up on inpatient unit:  unknown     Does this patient have any cognitive concerns?: Disoriented to time, place, situation     Activity level - Baseline/Home:  Unknown  Activity Level - Current:   Unknown    Patient's Preferred language: English   Needed?: No    Isolation: None  Infection: Not Applicable  Patient tested for COVID 19 prior to admission:   Bariatric?: No    Vital Signs:   Vitals:    03/27/22 1120 03/27/22 1127   BP: (!) 155/47    Pulse: 74    Resp:  16   Temp: (!) 96.7  F (35.9  C)    TempSrc: Temporal    SpO2: 100%    Weight: 59 kg (130 lb)        Cardiac Rhythm:     Was the PSS-3 completed:   No -confused   What interventions are required if any?               Family Comments: nieces here   OBS brochure/video discussed/provided to patient/family: No              Name of person given brochure if not patient:               Relationship to patient:     For the majority of the shift this patient's behavior was Green.   Behavioral interventions performed were .    ED NURSE PHONE NUMBER: [phone]

## 2022-03-27 NOTE — H&P
[facility]    History and Physical - Hospitalist Service       Date of Admission:  3/27/2022    Assessment & Plan      Lyla Duran is a 89 year old female with past medical history significant for dementia, hypertension, hyperlipidemia, coronary artery disease, and CKD stage III admitted on 3/27/2022 with nausea, vomiting, diarrhea.    Nausea, vomiting, diarrhea - Likely viral gastroenteritis   Patient presents the emergency department with about 4 days of abdominal pain, bloating and an isolated episode of emesis.  Over the past day she started to have fairly significant diarrhea.  She has also been more confused than baseline.  *Work-up in the emergency department was fairly unremarkable.  Renal function is at baseline.  And her electrolytes are within normal limits. UA is unremarkable. CT abdomen pelvis was obtained which was unremarkable.  *Due to the patient's advanced age, frailty and underlying dementia, she will be admitted to observation until her symptoms and mental status improved.  -Continue gentle IV fluid resuscitation.  -Antiemetics as needed  -Enteric panel ordered   -Tylenol as needed  -Monitor renal function and electrolytes    CKD stage III  Creatinine is 1.32.  This is near her recent baseline.  -Monitor renal function and avoid nephrotoxins    Hypertension.   Blood pressures are quite elevated here with systolic pressures in the 180s. Prior to admission medications include hydrochlorothiazide and lisinopril.  -Given ongoing diarrhea will hold hydrochlorothiazide, continue PTA lisinopril  -5 mg IV hydralazine as needed for systolic blood pressure greater than 180    Normocytic anemia  Hemoglobin is 10.1.  This is near her recent baseline.  No evidence of active bleeding.  -Monitor hemoglobin    Hx of hypothyroidism   The patient previously has a hx of hypothyroidism and was on levothyroxine in the past. Recent TSH has been persistently low, so Levothyroxine has been  discontinued   - Repeat TSH/T4    Dementia without behavioral disturbance  Patient's family reports that she has been more confused than baseline.  She has exhibited some roaming behavior but no agitation.  * currently she is quite confused, needs frequent redirection  -Monitor for delirium/sundownig   -Will order low dose seroquel   -melatonin at night   -bed alarm   -falls precautions  -may need a bedside attendant     Diet: Advance diet as tolerated  DVT Prophylaxis: Low Risk/Ambulatory with no VTE prophylaxis indicated  Concepcion Catheter: Not present  Central Lines: None  Cardiac Monitoring: None  Code Status: DNR/DNI      Disposition Plan   Expected Discharge: 1-2 days  Anticipated discharge location:  Awaiting care coordination huddle  The patient's care was discussed with the Patient.    Sonia Rodgers  [facility] Hospitalist Service  [facility]  Securely message with the Vocera Web Console (learn more here)  Text page via [email or web address] Paging/Directory         ______________________________________________________________________    Chief Complaint   Diarrhea    History is obtained from the patient    History of Present Illness   Lyla Duran is a 89 year old female who presents to the emergency department today with 4 days of abdominal pain, bloating, and vomiting, and 1 day of diarrhea.  Her family also reports that she has been having increasing confusion and has been behaving abnormally for her.  They do report that she has been eating and drinking okay.     She lives at the [facility] in [place].  It sounds like she lives fairly independently but she does have providers that come in and check in on her frequently.  Over the past couple of days her family members have been staying with her because they have been worried about her.  They are concerned that she would be unsafe going home by herself until her symptoms have improved and her mental status is closer to baseline.  The patient is unable to  provide additional hx due to She will be admitted to the hospital for further evaluation.    Review of Systems    The 10 point Review of Systems is negative other than noted in the HPI or here.     Past Medical History    I have reviewed this patient's medical history and updated it with pertinent information if needed.   Past Medical History:   Diagnosis Date     ACP (advance care planning) 9-11-13    form given today     Dysequilibrium      Hypothyroid      Mixed hyperlipidemia     Hyperlipidemia     Poor short-term memory      Presbyacusis     Presbycusis     Unspecified essential hypertension     Essential hypertension       Past Surgical History   I have reviewed this patient's surgical history and updated it with pertinent information if needed.  Past Surgical History:   Procedure Laterality Date     APPENDECTOMY       CATARACT IOL, RT/LT      Cataract IOL RT/LT     EYE SURGERY      retinal hemorrage      THYROIDECTOMY       Thyroidectomy     TONSILLECTOMY & ADENOIDECTOMY       VITRECTOMY PARSPLANA, SECONDARY INTRAOCULAR LENS IMPLANT, COMBINED  9/18/2013    Procedure: COMBINED VITRECTOMY PARSPLANA, SECONDARY INTRAOCULAR LENS IMPLANT;  LEFT 20 GAUGE VITRECTOMY PARSPLANA, EXCHANGE INTRAOCULAR LENS IMPLANT;  Surgeon: Brandon Umanzor MD;  Location: [facility]       Social History   I have reviewed this patient's social history and updated it with pertinent information if needed.  Social History     Tobacco Use     Smoking status: Never Smoker     Smokeless tobacco: Never Used   Substance Use Topics     Alcohol use: Yes     Alcohol/week: 0.8 standard drinks     Types: 1 Standard drinks or equivalent per week     Drug use: No       Family History   I have reviewed this patient's family history and updated it with pertinent information if needed.  Family History   Problem Relation Age of Onset     Cancer Mother      Heart Disease Father      C.A.D. Brother      Cancer Brother      C.A.D. Brother        Prior to  Admission Medications   Prior to Admission Medications   Prescriptions Last Dose Informant Patient Reported? Taking?   hydrochlorothiazide (HYDRODIURIL) 25 MG tablet 3/27/2022 at 0930  No Yes   Sig: Take 1 tablet (25 mg) by mouth every morning   lisinopril (ZESTRIL) 20 MG tablet 3/27/2022 at 0930  No Yes   Sig: TAKE ONE (1) TABLET BY MOUTH DAILY ...THANK YOU...   melatonin 5 MG tablet 3/26/2022 at Unknown time  Yes Yes   Sig: Take 5 mg by mouth at bedtime as needed, may repeat once for sleep      Facility-Administered Medications: None     Allergies   No Known Allergies    Physical Exam   Vital Signs: Temp: (!) 96.7  F (35.9  C) Temp src: Temporal BP: (!) 186/76 Pulse: 72   Resp: 16 SpO2: 100 % O2 Device: None (Room air)    Weight: 130 lbs 0 oz    Constitutional: Awake, alert, no apparent distress.  Eyes: Conjunctiva and pupils examined and normal.  HEENT: Moist mucous membranes, normal dentition.  Respiratory: Clear to auscultation bilaterally, no crackles or wheezing.  Cardiovascular: Regular rate and rhythm, normal S1 and S2, and no murmur noted.  GI: Soft, non-distended, non-tender, normal bowel sounds.  Skin: No rashes, no cyanosis, no edema.  Musculoskeletal: No joint swelling, erythema or tenderness.  Neurologic: Cranial nerves 2-12 intact, normal strength and sensation.  Psychiatric: Alert, oriented to person, confused about time/place and situation. Needs constant reminding of why she is here and redirection. She is wanting to get out of bed frequently.       Data   Data reviewed today: I reviewed all medications, new labs and imaging results over the last 24 hours.     Recent Labs   Lab 03/27/22  1206   WBC 5.8   HGB 10.1*   MCV 93         POTASSIUM 3.6   CHLORIDE 105   CO2 26   BUN 27   CR 1.32*   ANIONGAP 6   TATYANA 8.3*   GLC 93   ALBUMIN 3.2*   PROTTOTAL 6.7*   BILITOTAL 0.3   ALKPHOS 68   ALT 15   AST 19   LIPASE 133     Recent Results (from the past 24 hour(s))   Head CT w/o contrast     Narrative    EXAM: CT HEAD W/O CONTRAST  LOCATION: [facility]  DATE/TIME: 3/27/2022 2:13 PM    INDICATION: Mental status change, unknown cause  COMPARISON: None.  TECHNIQUE: Routine CT Head without IV contrast. Multiplanar reformats. Dose reduction techniques were used.    FINDINGS:  INTRACRANIAL CONTENTS: No evidence of acute intracranial hemorrhage or mass effect. Scattered foci of decreased attenuation within the cerebral hemispheric white matter which are nonspecific, though most commonly ascribed to chronic small vessel ischemic   disease. The ventricles and sulci are prominent consistent with moderate brain parenchymal volume loss. Gray-white matter differentiation is maintained. The basilar cisterns are patent.    VISUALIZED ORBITS/SINUSES/MASTOIDS: Bilateral cataract surgery. The partially imaged globes are otherwise unremarkable. The partially imaged paranasal sinuses, mastoid air cells and middle ear cavities are unremarkable.     BONES/SOFT TISSUES: The visualized skull base and calvarium are unremarkable.      Impression    IMPRESSION:    1.  No evidence of acute intracranial hemorrhage or mass effect.  2.  Mild nonspecific white matter changes.  3.  Moderate brain parenchymal volume loss.   CT Abdomen Pelvis w/o Contrast    Narrative    EXAM: CT ABDOMEN AND PELVIS WITHOUT CONTRAST  LOCATION: [facility]  DATE/TIME: 03/27/2022, 2:16 PM    INDICATION: Nausea/vomiting. Abdominal pain, acute, nonlocalized.  COMPARISON: None.  TECHNIQUE: CT scan of the abdomen and pelvis was performed without IV contrast. Multiplanar reformats were obtained. Dose reduction techniques were used.  CONTRAST: None.    FINDINGS:   LOWER CHEST: Unremarkable.    HEPATOBILIARY: Normal.    PANCREAS: Normal.    SPLEEN: Normal.    ADRENAL GLANDS: Normal.    KIDNEYS/BLADDER: Normal.    BOWEL: Colonic diverticulosis without evidence of acute diverticulitis. No bowel obstruction or  inflammatory change. Appendix not visualized.    LYMPH NODES: Normal.    VASCULATURE: Nonaneurysmal aortic atherosclerosis.    PELVIC ORGANS: Normal.    MUSCULOSKELETAL: No destructive bone lesions.      Impression    IMPRESSION:   1.  No specific cause for abdominal pain demonstrated.

## 2022-03-27 NOTE — PROGRESS NOTES
RECEIVING UNIT ED HANDOFF REVIEW    ED Nurse Handoff Report was reviewed by: Haritha Lynn RN on March 27, 2022 at 4:24 PM

## 2022-03-27 NOTE — ED NOTES
Patient agitated, yelling, hitting. Unable to be reoriented to situation or de-escalated. MD notified.

## 2022-03-27 NOTE — PHARMACY-ADMISSION MEDICATION HISTORY
Pharmacy Medication History  Admission medication history interview status for the 3/27/2022  admission is complete. See EPIC admission navigator for prior to admission medications     Location of Interview: Patient room with face mask  Medication history sources: Patient, Patient's family/friend (daughters at the bedside) and Surescripts    Significant changes made to the medication list:  Added: melatonin  Changed: none  Removed: levothyroxine    In the past week, patient estimated taking medication this percent of the time: greater than 90%    Additional medication history information:   - melatonin - takes most nights and does family will administer an additional dose if needed.     Medication reconciliation completed by provider prior to medication history? No    Time spent in this activity: 15 minutes    Prior to Admission medications    Medication Sig Last Dose Taking? Auth Provider   hydrochlorothiazide (HYDRODIURIL) 25 MG tablet Take 1 tablet (25 mg) by mouth every morning 3/27/2022 at 0930 Yes Rajan Butt MD   lisinopril (ZESTRIL) 20 MG tablet TAKE ONE (1) TABLET BY MOUTH DAILY ...THANK YOU... 3/27/2022 at 0930 Yes Rajan Butt MD   melatonin 5 MG tablet Take 5 mg by mouth at bedtime as needed, may repeat once for sleep 3/26/2022 at Unknown time Yes Unknown, Entered By History     The information provided in this note is only as accurate as the sources available at the time of update(s)

## 2022-03-27 NOTE — ED TRIAGE NOTES
Pt here with family. She has had 3 days of nausea vomiting and diarrhea. She has some memory issues at baseline. She has been more confused than baseline for the past week. Patient didn't sleep at all last night. She has had multiple episodes of wattery stools this AM.

## 2022-03-27 NOTE — ED PROVIDER NOTES
History   Chief Complaint:  Nausea, Vomiting, & Diarrhea       The history is provided by a friend. The history is limited by the condition of the patient.      Lyla Duran is a 89 year old female with history of dementia without behavioral disturbance, hypertension, hyperlipidemia, CAD, and CKD stage 3 who presents with vomiting, diarrhea, and confusion that began on Thursday (3/24/22). Per her family members, she had experienced vomiting, abdominal pain, bloating, and momentary chest pain on Thursday evening. She vomited on 3/25/22 as well and has not done so again since. They stayed with her last night and monitored her worsening symptoms. The patient did not sleep last night and had around 7 episodes of diarrhea, the last being at 0500. She has been acting abnormally this past week and will roam aimlessly and continuously ramble. They also said she is showing signs of confusion. She has been eating and drinking regularly and was placed on the BRAT diet for the diarrhea. They denied any fevers. She has recently been in contact with an ill family members. She lives at [facility] where care providers check in on her.     Review of Systems   Unable to perform ROS: Dementia (Provided by friends)   Constitutional: Negative for appetite change and fever.   Cardiovascular: Positive for chest pain (momentary).   Gastrointestinal: Positive for abdominal pain, diarrhea and vomiting.   Psychiatric/Behavioral: Positive for confusion (roaming, rambling sentences) and sleep disturbance.     Allergies:  No Known Allergies    Medications:  Hydrodiuril  Levothyroxine  Lisinopril, 20 mg  Oretic  Melatonin, 5 mg   Hydrochlorothiazide, 25 mg     Past Medical History:     Dysequilibrium  Hyperlipidemia  Poor short-term memory  Presbyacusis  Hypertension  Hypothyroid  CAD  Pneumonia  Dementia without behavioral disturbance  Chronic kidney disease, stage 3    Past Surgical History:    Appendectomy  Cataract  Eye  surgery  Thyroidectomy  Tonsillectomy & adenoidectomy  Vitrectomy parsplana, secondary intraocular lens implant     Family History:    Mother: cancer  Father: heart disease  Brother: CAD, cancer    Social History:  The patient presents to the ED with two of her friends. She lives at [facility] in [place].    Physical Exam     Patient Vitals for the past 24 hrs:   BP Temp Temp src Pulse Resp SpO2 Weight   03/27/22 1127 -- -- -- -- 16 -- --   03/27/22 1120 (!) 155/47 (!) 96.7  F (35.9  C) Temporal 74 -- 100 % 59 kg (130 lb)     Physical Exam  General: Well-nourished,appears to be resting comfortably when I enter the room  Eyes: PERRL, conjunctivae pink no scleral icterus or conjunctival injection  ENT:  Mildly mucus membranes, posterior oropharynx clear without erythema or exudates  Respiratory:  Lungs clear to auscultation bilaterally, no crackles/rubs/wheezes.  Good air movement  CV: Normal rate and rhythm, no murmurs/rubs/gallops  GI:  Abdomen soft and non-distended.  Normoactive BS.  No tenderness, guarding or rebound  Skin: Warm, dry.  No rashes or petechiae  Musculoskeletal: No peripheral edema or calf tenderness  Neuro: Alert and oriented to person/place/time  Psychiatric: Normal affect    Emergency Department Course   ECG  ECG obtained at 1234, ECG read at 1239  Sinus rhythm  Nonspecific ST abnormality   Changes noted as compared to prior, dated 1/18/21.  Rate 71 bpm. CA interval * ms. QRS duration 72 ms. QT/QTc 390/423 ms. P-R-T axes * -12 41.     Imaging:  CT Abdomen Pelvis w/o Contrast   Final Result   IMPRESSION:    1.  No specific cause for abdominal pain demonstrated.         Head CT w/o contrast   Final Result   IMPRESSION:     1.  No evidence of acute intracranial hemorrhage or mass effect.   2.  Mild nonspecific white matter changes.   3.  Moderate brain parenchymal volume loss.        Report per radiology    Laboratory:  Labs Ordered and Resulted from Time of ED Arrival to Time of ED Departure    COMPREHENSIVE METABOLIC PANEL - Abnormal       Result Value    Sodium 137      Potassium 3.6      Chloride 105      Carbon Dioxide (CO2) 26      Anion Gap 6      Urea Nitrogen 27      Creatinine 1.32 (*)     Calcium 8.3 (*)     Glucose 93      Alkaline Phosphatase 68      AST 19      ALT 15      Protein Total 6.7 (*)     Albumin 3.2 (*)     Bilirubin Total 0.3      GFR Estimate 37 (*)    ROUTINE UA WITH MICROSCOPIC REFLEX TO CULTURE - Abnormal    Color Urine Light Yellow      Appearance Urine Clear      Glucose Urine Negative      Bilirubin Urine Negative      Ketones Urine Negative      Specific Gravity Urine 1.012      Blood Urine Negative      pH Urine 5.0      Protein Albumin Urine Negative      Urobilinogen Urine Normal      Nitrite Urine Negative      Leukocyte Esterase Urine Trace (*)     Bacteria Urine Few (*)     RBC Urine <1      WBC Urine 3      Squamous Epithelials Urine <1     CBC WITH PLATELETS AND DIFFERENTIAL - Abnormal    WBC Count 5.8      RBC Count 3.34 (*)     Hemoglobin 10.1 (*)     Hematocrit 31.2 (*)     MCV 93      MCH 30.2      MCHC 32.4      RDW 12.6      Platelet Count 197      % Neutrophils 62      % Lymphocytes 24      % Monocytes 12      % Eosinophils 2      % Basophils 0      % Immature Granulocytes 0      NRBCs per 100 WBC 0      Absolute Neutrophils 3.7      Absolute Lymphocytes 1.4      Absolute Monocytes 0.7      Absolute Eosinophils 0.1      Absolute Basophils 0.0      Absolute Immature Granulocytes 0.0      Absolute NRBCs 0.0     LIPASE - Normal    Lipase 133     ENTERIC BACTERIA AND VIRUS PANEL BY LUCY STOOL        Procedures  none    Emergency Department Course:     Reviewed:  I reviewed nursing notes, vitals, past medical history and Care Everywhere    Assessments:  1142 I obtained history and examined the patient as noted above.   1311 I rechecked the patient and explained findings.     Interventions:  1207 NA 1 L IV  1211 Zofran 4 mg IV    Disposition:  The patient was  admitted to the hospital under the care of Dr. Rodgers.    Impression & Plan     CMS Diagnoses: None     Medical Decision Making:  Lyla Duran is a 89 year old female who comes for confusion, abdominal pain, nausea, vomiting and diarrhea. Labs are reassuring. Head CT was obtained and CT abdomen and pelvis were obtained and showed no significant abnormality.  However, given her age and the persistence of the diarrhea and the vomiting, we will admit her to observation for ongoing monitoring, treatment with fluids and antiemetics until she is improved and safe and her mental status has returned to baseline.     Diagnosis:    ICD-10-CM    1. Nausea vomiting and diarrhea  R11.2     R19.7        Scribe Disclosure:  Hi BOLDEN, am serving as a scribe at 11:42 AM on 3/27/2022 to document services personally performed by Erin Bazan MD based on my observations and the provider's statements to me.        Erin Bazan MD  03/27/22 [id number]

## 2022-03-28 VITALS
OXYGEN SATURATION: 94 % | DIASTOLIC BLOOD PRESSURE: 81 MMHG | SYSTOLIC BLOOD PRESSURE: 152 MMHG | HEART RATE: 83 BPM | BODY MASS INDEX: 23.78 KG/M2 | RESPIRATION RATE: 16 BRPM | TEMPERATURE: 98.4 F | WEIGHT: 130 LBS

## 2022-03-28 LAB
ANION GAP SERPL CALCULATED.3IONS-SCNC: 4 MMOL/L (ref 3–14)
BUN SERPL-MCNC: 20 MG/DL (ref 7–30)
CALCIUM SERPL-MCNC: 8.1 MG/DL (ref 8.5–10.1)
CHLORIDE BLD-SCNC: 111 MMOL/L (ref 94–109)
CO2 SERPL-SCNC: 25 MMOL/L (ref 20–32)
CREAT SERPL-MCNC: 1.22 MG/DL (ref 0.52–1.04)
GFR SERPL CREATININE-BSD FRML MDRD: 40 ML/MIN/1.73M2
GLUCOSE BLD-MCNC: 82 MG/DL (ref 70–99)
POTASSIUM BLD-SCNC: 3.4 MMOL/L (ref 3.4–5.3)
SODIUM SERPL-SCNC: 140 MMOL/L (ref 133–144)

## 2022-03-28 PROCEDURE — 258N000003 HC RX IP 258 OP 636: Performed by: STUDENT IN AN ORGANIZED HEALTH CARE EDUCATION/TRAINING PROGRAM

## 2022-03-28 PROCEDURE — 99217 PR OBSERVATION CARE DISCHARGE: CPT | Performed by: INTERNAL MEDICINE

## 2022-03-28 PROCEDURE — G0378 HOSPITAL OBSERVATION PER HR: HCPCS

## 2022-03-28 PROCEDURE — 250N000013 HC RX MED GY IP 250 OP 250 PS 637: Performed by: STUDENT IN AN ORGANIZED HEALTH CARE EDUCATION/TRAINING PROGRAM

## 2022-03-28 PROCEDURE — 96361 HYDRATE IV INFUSION ADD-ON: CPT

## 2022-03-28 PROCEDURE — 82310 ASSAY OF CALCIUM: CPT | Performed by: STUDENT IN AN ORGANIZED HEALTH CARE EDUCATION/TRAINING PROGRAM

## 2022-03-28 PROCEDURE — 36415 COLL VENOUS BLD VENIPUNCTURE: CPT | Performed by: STUDENT IN AN ORGANIZED HEALTH CARE EDUCATION/TRAINING PROGRAM

## 2022-03-28 RX ORDER — ACETAMINOPHEN 325 MG/1
650 TABLET ORAL EVERY 6 HOURS PRN
COMMUNITY
Start: 2022-03-28

## 2022-03-28 RX ORDER — QUETIAPINE FUMARATE 25 MG/1
12.5 TABLET, FILM COATED ORAL
Qty: 15 TABLET | Refills: 0 | Status: SHIPPED | OUTPATIENT
Start: 2022-03-28 | End: 2022-04-08

## 2022-03-28 RX ORDER — OLANZAPINE 10 MG/2ML
5 INJECTION, POWDER, FOR SOLUTION INTRAMUSCULAR 2 TIMES DAILY PRN
Status: DISCONTINUED | OUTPATIENT
Start: 2022-03-28 | End: 2022-03-28 | Stop reason: HOSPADM

## 2022-03-28 RX ADMIN — SODIUM CHLORIDE: 9 INJECTION, SOLUTION INTRAVENOUS at 06:23

## 2022-03-28 RX ADMIN — LISINOPRIL 20 MG: 20 TABLET ORAL at 10:04

## 2022-03-28 NOTE — PLAN OF CARE
Goal Outcome Evaluation:    Orientation/Cognitive: Pt confused. Unable to answer orientation questions  Observation Goals (Met/ Not Met): not met  Mobility Level/Assist Equipment: unknown. Pt not oob this shift   Fall Risk (Y/N): Y  Behavior Concerns: uncooperative  Pain Management: PAINAD 2, has PRN tylenol available, pt too confused and uncooperative for pills at this time  Tele/VS/O2: VSS on RA ex HTN  ABNL Lab/BG: cr 1.32, GFR 37, hgb 10.1,   Diet: clears, advance as tolerated  Bowel/Bladder: incontinent. No BM this shift.   Skin Concerns: dry/flaky, scattered bruising  Drains/Devices: PIV infusing NS at 75mL/hr  Tests/Procedures for next shift: AM labs, stool sample needed   Anticipated DC date & active delays: TBD  pending improvement and plan   Patient Stated Goal for Today: unable to give goal     Observation goals  PRIOR TO DISCHARGE       Comments: List all goals to be met before discharge home   - Nausea/vomiting (diarrhea if present) improved: unknown, pt unable to report nausea, no vomiting or diarrhea currently  - Tolerating oral intake to maintain hydration: not met  - Vital signs normal or at patient baseline and orthostatic vitals are normal and patient not lightheaded with standing: partially met, HTN  - Diagnostic tests and consults completed and resulted if ordered: not met   - Adequate pain control on oral analgesia: met   - Tolerating oral antibiotics if ordered: NA   - Safe disposition plan has been identified: not met   - Nurse to notify provider when observation goals have been met and patient is ready for discharge.

## 2022-03-28 NOTE — PROVIDER NOTIFICATION
MD Notification    Notified Person: MD    Notified Person Name: Dr. Allen    Notification Date/Time: 3/28/22 0030    Notification Interaction: text page    Purpose of Notification: Pt continues to be agitated, restless, screaming out & disoriented. Please advise/PRN medication.    Orders Received: PRN IM zyprexa added    Comments: paged second time

## 2022-03-28 NOTE — PLAN OF CARE
Goal Outcome Evaluation:     Orientation/Cognitive: Alert to self only. Agitated, restless and non redirectable at times.  Observation Goals (Met/ Not Met): not met  Mobility Level/Assist Equipment: Ax2 GB pivot to [facility].  Fall Risk (Y/N): Y  Behavior Concerns: uncooperative, agitated at times. PRN seroquel given x1. PRN IM zyprexa ordered.   Pain Management: denies pain/no nonverbal signs of pain. PRN tylenol available.  Tele/VS/O2: VSS on RA ex HTN  ABNL Lab/BG: cr 1.32, GFR 37, hgb 10.1   Diet: clears, advance as tolerated. Tolerating sips of water.   Bowel/Bladder: Incontinent. No BM this shift.   Skin Concerns: dry/flaky, scattered bruising  Drains/Devices: PIV infusing NS at 75mL/hr  Tests/Procedures for next shift: AM labs, stool sample needing to be collected  Anticipated DC date & active delays: pending pt progress  Patient Stated Goal for Today: TIARRA  Other: enteric precautions maintained for rule out. Sitter at bedside.     Observation goals  PRIOR TO   DISCHARGE       Comments: List all goals to be met before discharge home   - Nausea/vomiting (diarrhea if present) improved: Met, reports no nausea. No BM overnight.   - Tolerating oral intake to maintain hydration: not met, taking sips of water when awake  - Vital signs normal or at patient baseline and orthostatic vitals are normal and patient not lightheaded with standing: partially met, HTN  - Diagnostic tests and consults completed and resulted if ordered: not met   - Adequate pain control on oral analgesia: met   - Tolerating oral antibiotics if ordered: NA   - Safe disposition plan has been identified: not met   - Nurse to notify provider when observation goals have been met and patient is ready for discharge.

## 2022-03-28 NOTE — PROGRESS NOTES
Observation goals  PRIOR TO DISCHARGE       Comments: List all goals to be met before discharge home   - Nausea/vomiting (diarrhea if present) improved: unknown, pt unable to report nausea, no vomiting or diarrhea currently  - Tolerating oral intake to maintain hydration: not met  - Vital signs normal or at patient baseline and orthostatic vitals are normal and patient not lightheaded with standing: partially met, HTN  - Diagnostic tests and consults completed and resulted if ordered: not met   - Adequate pain control on oral analgesia: met   - Tolerating oral antibiotics if ordered: NA   - Safe disposition plan has been identified: not met   - Nurse to notify provider when observation goals have been met and patient is ready for discharge.

## 2022-03-28 NOTE — PROGRESS NOTES
Observation goals  PRIOR TO DISCHARGE       Comments: List all goals to be met before discharge home   - Nausea/vomiting (diarrhea if present) improved: Met, reports no nausea. No BM.   - Tolerating oral intake to maintain hydration: not met, taking sips of water when awake  - Vital signs normal or at patient baseline and orthostatic vitals are normal and patient not lightheaded with standing: partially met, HTN  - Diagnostic tests and consults completed and resulted if ordered: not met   - Adequate pain control on oral analgesia: met   - Tolerating oral antibiotics if ordered: NA   - Safe disposition plan has been identified: not met   - Nurse to notify provider when observation goals have been met and patient is ready for discharge.

## 2022-03-28 NOTE — DISCHARGE SUMMARY
Discharge Summary    Lyla Duran MRN# [id number]   YOB: 1925 Age: 89 year old     Date of Admission:  3/27/2022  Date of Discharge:  3/28/2022  Admitting Physician:  Sonia Rodgers  Discharge Physician:  Davide Wells MD  Discharging Service:  Hospitalist       Primary Provider: Rajan Butt          Admission / Discharge Diagnoses:   1. Dehydration  2. Acute gastroenteritis  3. Dementia with delirium  4. History of hypertension               Discharge Disposition:     Discharged to home with family           Condition on Discharge:     Discharge condition: Poor   Discharge vitals: Blood pressure (!) 152/81, pulse 83, temperature 98.4  F (36.9  C), temperature source Axillary, resp. rate 16, weight 59 kg (130 lb), SpO2 94 %.   Code status on discharge: DNR / DNI     Discharge Exam  Constitutional: Sleepy, NAD, oriented to self only. .  Eyes: Conjunctiva and pupils examined and normal.  HEENT: Moist mucous membranes, normal dentition.  Respiratory: Clear to auscultation bilaterally, no crackles or wheezing.  Cardiovascular: Regular rate and rhythm, normal S1 and S2, and no murmur noted.  GI: Soft, non-distended, non-tender, normal bowel sounds.  Skin: No rashes, no cyanosis, no edema.  Musculoskeletal: No joint swelling, erythema or tenderness.  Neurologic: non focal         Procedures / Labs / Imaging:           Medications Prior to Admission:     Medications Prior to Admission   Medication Sig Dispense Refill Last Dose     lisinopril (ZESTRIL) 20 MG tablet TAKE ONE (1) TABLET BY MOUTH DAILY ...THANK YOU... 90 tablet 3 3/27/2022 at 0930     melatonin 5 MG tablet Take 5 mg by mouth at bedtime as needed, may repeat once for sleep   3/26/2022 at Unknown time     [DISCONTINUED] hydrochlorothiazide (HYDRODIURIL) 25 MG tablet Take 1 tablet (25 mg) by mouth every morning 90 tablet 3 3/27/2022 at 0930             Discharge Medications:     Current Discharge Medication List      START taking  these medications    Details   acetaminophen (TYLENOL) 325 MG tablet Take 2 tablets (650 mg) by mouth every 6 hours as needed for mild pain or other (and adjunct with moderate or severe pain or per patient request)    Associated Diagnoses: Dementia without behavioral disturbance, unspecified dementia type (H)      QUEtiapine (SEROQUEL) 25 MG tablet Take 0.5 tablets (12.5 mg) by mouth nightly as needed (Agitation)  Qty: 15 tablet, Refills: 0    Associated Diagnoses: Dementia without behavioral disturbance, unspecified dementia type (H)         CONTINUE these medications which have NOT CHANGED    Details   lisinopril (ZESTRIL) 20 MG tablet TAKE ONE (1) TABLET BY MOUTH DAILY ...THANK YOU...  Qty: 90 tablet, Refills: 3    Associated Diagnoses: Essential hypertension      melatonin 5 MG tablet Take 5 mg by mouth at bedtime as needed, may repeat once for sleep         STOP taking these medications       hydrochlorothiazide (HYDRODIURIL) 25 MG tablet Comments:   Reason for Stopping:                     Consultations:     none             Brief History of Illness:     Lyla Duran is a 89 year old female with past medical history significant for dementia, hypertension, hyperlipidemia, coronary artery disease, and CKD stage III admitted on 3/27/2022 with nausea, vomiting, diarrhea, found to have dehydration          Hospital Course:     Nausea, vomiting, diarrhea - Likely viral gastroenteritis   Work-up unremarkable, no bacterial etiology found  Treated with IV fluids     CKD stage III  Creatinine is 1.32.  This is near her recent baseline.  -Monitor renal function and avoid nephrotoxins  Held hydrochlorothiazide at discharge     Hypertension.   -Given ongoing diarrhea will hold hydrochlorothiazide, continue PTA lisinopril     Normocytic anemia  Hemoglobin is 10.1.  This is near her recent baseline.  No evidence of active bleeding.  -Monitor hemoglobin     Hx of hypothyroidism   Defer to primary care provider     Dementia  without behavioral disturbance  Patient's family reports that she has been more confused than baseline.  She has exhibited some roaming behavior but no agitation.  While in hospital the patient was significantly confused and had some delirium discussed with patient's niece Mary And her brother in detail  Recommend patient need 24/7 cares and supervision going forward for her own safety  Family would like to take her back home and continue the current care system and will be able to take care of her.     Disposition: Discharged home with family so long as they can provide 24/7 cares.  Concerned that this might be too much for the family and the patient will need placement.  This is a high risk discharge but will comply with the family wishes since they have been managing her and feel comfortable taking care of her needs. Our opinion is based on the severity of her delirium which can be profound when in a new situation.                 Pending Results:                Discharge Instructions and Follow-Up:     Discharge diet: Regular, encourage more fluids   Discharge activity: Activity as tolerated   Discharge follow-up: Follow up with primary care provider in 1 week.     Outpatient therapy: None.     Home Care agency: None    Supplies and equipment: None   Lines and drains: None    Wound care: None   Other instructions: None      Total Time: min spent coordinating discharge.   Davide Wells MD

## 2022-03-28 NOTE — PROGRESS NOTES
List all goals to be met before discharge home   - Nausea/vomiting (diarrhea if present) improved. Met  - Tolerating oral intake to maintain hydration : Partially met  - Vital signs normal or at patient baseline and orthostatic vitals are: Met normal and patient not lightheaded with standing: partially Met  - Diagnostic tests and consults completed and resulted if ordered Met  - Adequate pain control on oral analgesia : N/A  - Tolerating oral antibiotics if ordered N/A  - Safe disposition plan has been identified :in progress  - Nurse to notify provider when observation goals have been met and patient is ready for discharge.

## 2022-03-28 NOTE — PLAN OF CARE
Goal Outcome Evaluation:    Patient Information  Name: Lyla Duran  Age: 89 year old  Reason for admission: Nausea, vomiting and diarrhea.  DATE & TIME:03/28/22,    Cognitive Concerns/ Orientation : oriented only to self  BEHAVIOR & AGGRESSION TOOL COLOR: yellow  ABNL VS/O2: VSS, room air  MOBILITY: assist of one  PAIN MANAGMENT: Denied  DIET: Clear liq ADAT  BOWEL/BLADDER: Incontinent of urine. No stool  ABNL LAB/BG: cr=1.22  DRAIN/DEVICES: PIV infusing  SKIN: Bruises arms  TESTS/PROCEDURES:   D/C DATE: possibly today  OTHER IMPORTANT INFO: Enteric ISO, still need stool specimen. Possible discharge today. Continue to monitor.

## 2022-03-28 NOTE — PROVIDER NOTIFICATION
MD Notification    Notified Person: MD    Notified Person Name: Dr. Allen    Notification Date/Time: 3/28/22 0000    Notification Interaction: text page    Purpose of Notification: Pt agitated, restless, screaming out & not redirectable. PRN seroquel given w no change. Sitter at bedside- pt attempting to get out of bed. Please add med.    Orders Received:    Comments:

## 2022-03-28 NOTE — CONSULTS
Care Management Initial Consult    General Information  Assessment completed with:  ,    Type of CM/SW Visit: Initial Assessment    Primary Care Provider verified and updated as needed: Yes   Readmission within the last 30 days:   no        Advance Care Planning:  No ACP docs          Communication Assessment  Patient's communication style: spoken language (English or Bilingual)    Hearing Difficulty or Deaf: yes        Cognitive  Cognitive/Neuro/Behavioral: .WDL except  Level of Consciousness: confused  Arousal Level: opens eyes spontaneously  Orientation: disoriented to, place, time, situation  Mood/Behavior: cooperative  Best Language: 0 - No aphasia  Speech: clear    Living Environment:   People in home: alone     Current living Arrangements:  ([facility]- Independent apartment)      Able to return to prior arrangements:  tbd       Family/Social Support:  Care provided by:  self  Provides care for:  No one     Description of Support System:       Current Resources:   Patient receiving home care services:  none        Equipment currently used at home:  walker       Employment/Financial:  Employment Status:          Financial Concerns:      Lifestyle & Psychosocial Needs:  Social Determinants of Health     Tobacco Use: Not on file   Alcohol Use: Not on file   Financial Resource Strain: Not on file   Food Insecurity: Not on file   Transportation Needs: Not on file   Physical Activity: Not on file   Stress: Not on file   Social Connections: Not on file   Intimate Partner Violence: Not on file   Depression: Not at risk     PHQ-2 Score: 0   Housing Stability: Not on file       Functional Status:  Prior to admission patient needed assistance:   Mental Health Status:  Chemical Dependency Status:        Values/Beliefs:  Spiritual, Cultural Beliefs, Mandaen Practices, Values that affect care:                 Additional Information:  Spoke to [facility] in [place] [phone]. Confirmed that patient lives in  Independent Apartment. She receives NO services or assist with medications.  Currently patient has a SITTER at bedside.    Called Jayna - her niece. [phone] to complete assessment. VM left.    Received call back from Jayna Dorantes is at her apartment daily from .She has HHA services also daily/eves 4:00-7:00 pm. thru [employer]. Jayna sets up her few medications. [facility]- chart updated.  At baseline she does her own evening cares and does have call buttons in the apartment. She is alone at night  She has a safety check every AM  Jayna feels her aunt gets much more confused when hospitalized and wishes her to return to apartment today if medically stable.  Nephew Carlo trotter transport-  [phone].    Discussed her increased confusion at this time. Jayna plans on staying overnight with her.  MD called with this update

## 2022-03-28 NOTE — PROGRESS NOTES
Pt cleared for discharge to home with 24/7 family supervision. Pt alert to self only, calm and cooperative. VSS on RA. Moves A1 GB/W. PIV removed. Discharge education reviewed with nephewabby, who will be the patient's caregiver. Questions were answered and caregiver expressed understanding. Pt discharged from unit in stable condition with all belongings and discharge medication. Caregiver provided transportation.

## 2022-03-29 ENCOUNTER — PATIENT OUTREACH (OUTPATIENT)
Dept: CARE COORDINATION | Facility: CLINIC | Age: 87
End: 2022-03-29
Payer: MEDICARE

## 2022-03-29 DIAGNOSIS — Z71.89 OTHER SPECIFIED COUNSELING: ICD-10-CM

## 2022-03-29 NOTE — PROGRESS NOTES
Clinic Care Coordination Contact  Patient identified for care management outreach, however patient is not on a value based contract so cannot complete outreach. Will escalate to clinic staff if specific needs or resources are indicated.    Verified with [facility] and pt is not apart of [facility], has only Medicare part B.     RENE Peters/ZAIRA Care Coordination Supervisor  [facility] - Care Coordination   3/29/2022 10:55 AM  [phone]

## 2022-04-07 NOTE — PROGRESS NOTES
Hospital Follow-up Visit:    Hospital/Nursing Home/IP Rehab Facility: [facility]  Date of Admission: 3/27/22  Date of Discharge: 3/28/22  Reason(s) for Admission: Nausea & Vomiting, Diarrhea - Dehydration      Was your hospitalization related to COVID-19? No   Problems taking medications regularly:  None  Medication changes since discharge: Start: Tylenol & Seroquel. Stop: HCTZ  Problems adhering to non-medication therapy:  None    Summary of hospitalization:  [facility] discharge summary reviewed  Diagnostic Tests/Treatments reviewed.  Follow up needed: none  Other Healthcare Providers Involved in Patient s Care:         None  Update since discharge: stable.   Post Discharge Medication Reconciliation: discharge medications reconciled and changed, per note/orders.  Plan of care communicated with patient and family        Problem(s) Oriented visit        SUBJECTIVE:                                                    Lyla Duran is a 89 year old female who presents to clinic today for the following health issues :    Needs sutures removed from back of right leg[facility] Fell once she got home from hospital due to altered consciousness daughter thinks was due to Seroquel that was started while patient hospitalized. They have since stopped giving her this medication.    Hypertension - hydrochlorothiazide was stopped while patient in the hospital but patient's family has restarted this since discharge. Also taking Lisinopril 20 mg daily.   BP Readings from Last 3 Encounters:   04/08/22 124/62   03/28/22 (!) 152/81   10/25/21 (!) 174/75       Problem list, Medication list, Allergies, and Medical/Social/Surgical histories reviewed in EPIC and updated as appropriate.   Additional history: as documented    ROS:  8 point ROS completed and negative except noted above, including Gen, CV, Resp, MS, Skin, Neuro, Psych    OBJECTIVE:                                                     /62   Pulse 87   Temp 97.1  F (36.2  C) (Skin)   Wt 59.4 kg (131 lb)   SpO2 97%   BMI 23.96 kg/m    Body mass index is 23.96 kg/m .   GENERAL: Frail elderly female appears comfortable, no distress  RESP: lungs clear to auscultation - no rales, rhonchi or wheezes  CV: normal rate, no edema  MS: very weak, using walker for ambulation, 1 person assist for transfer. Hematoma left upper calf area  SKIN: Healing laceration posterior left leg, upper calf - sutures present, no signs of infection  NEURO: oriented to person & place  PSYCH: normal mood     ASSESSMENT/PLAN:                                                      Lyla was seen today for hospital f/u.    Diagnoses and all orders for this visit:    Essential hypertension  -     hydrochlorothiazide (HYDRODIURIL) 25 MG tablet; Take 1 tablet (25 mg) by mouth daily  Okay to resume hydrochlorothiazide if patient not having side effects. If she becomes dizzy or dehydrated again would recommend stopping this permanently    Dementia without behavioral disturbance, unspecified dementia type (H)  Stable, patient's family does not wish to give her Seroquel. Medication taken off of MAR.    Stage 3a chronic kidney disease (H)  Known issue that I take into account for her medical decisions, no current exacerbations or new concerns.    Suture removal. The wound is well healed without signs of infection.  The sutures are removed. Return prn.        See Patient Instructions  Patient Instructions   Watch area where stitches taken out for signs of infection - redness, swelling, purulent drainage.    Hematoma - swelling posterior calf should slowly improve over the next several weeks. Follow-up if getting bigger, becomes painful, red      AUSTEN Nunez CNP  [facility]  Family Practice  [facility]  [phone]    For any issues my office # is [phone]

## 2022-04-08 ENCOUNTER — OFFICE VISIT (OUTPATIENT)
Dept: FAMILY MEDICINE | Facility: CLINIC | Age: 87
End: 2022-04-08

## 2022-04-08 VITALS
TEMPERATURE: 97.1 F | WEIGHT: 131 LBS | DIASTOLIC BLOOD PRESSURE: 62 MMHG | OXYGEN SATURATION: 97 % | HEART RATE: 87 BPM | SYSTOLIC BLOOD PRESSURE: 124 MMHG | BODY MASS INDEX: 23.96 KG/M2

## 2022-04-08 DIAGNOSIS — I10 ESSENTIAL HYPERTENSION: Primary | ICD-10-CM

## 2022-04-08 DIAGNOSIS — F03.90 DEMENTIA WITHOUT BEHAVIORAL DISTURBANCE, UNSPECIFIED DEMENTIA TYPE: ICD-10-CM

## 2022-04-08 DIAGNOSIS — N18.31 STAGE 3A CHRONIC KIDNEY DISEASE (H): ICD-10-CM

## 2022-04-08 DIAGNOSIS — Z48.02 VISIT FOR SUTURE REMOVAL: ICD-10-CM

## 2022-04-08 PROCEDURE — 99495 TRANSJ CARE MGMT MOD F2F 14D: CPT | Performed by: NURSE PRACTITIONER

## 2022-04-08 RX ORDER — HYDROCHLOROTHIAZIDE 25 MG/1
25 TABLET ORAL DAILY
Start: 2022-04-08 | End: 2022-09-28

## 2022-04-09 NOTE — PATIENT INSTRUCTIONS
Watch area where stitches taken out for signs of infection - redness, swelling, purulent drainage.    Hematoma - swelling posterior calf should slowly improve over the next several weeks. Follow-up if getting bigger, becomes painful, red

## 2022-06-14 ENCOUNTER — TRANSFERRED RECORDS (OUTPATIENT)
Dept: FAMILY MEDICINE | Facility: CLINIC | Age: 87
End: 2022-06-14

## 2022-07-01 ENCOUNTER — OFFICE VISIT (OUTPATIENT)
Dept: FAMILY MEDICINE | Facility: CLINIC | Age: 87
End: 2022-07-01

## 2022-07-01 VITALS
OXYGEN SATURATION: 97 % | BODY MASS INDEX: 23.96 KG/M2 | HEART RATE: 73 BPM | WEIGHT: 131 LBS | DIASTOLIC BLOOD PRESSURE: 66 MMHG | SYSTOLIC BLOOD PRESSURE: 121 MMHG

## 2022-07-01 DIAGNOSIS — L82.0 INFLAMED SEBORRHEIC KERATOSIS: ICD-10-CM

## 2022-07-01 PROBLEM — R19.7 NAUSEA VOMITING AND DIARRHEA: Status: RESOLVED | Noted: 2022-03-27 | Resolved: 2022-07-01

## 2022-07-01 PROBLEM — R11.2 NAUSEA VOMITING AND DIARRHEA: Status: RESOLVED | Noted: 2022-03-27 | Resolved: 2022-07-01

## 2022-07-01 PROBLEM — J18.9 PNEUMONIA: Status: RESOLVED | Noted: 2018-05-17 | Resolved: 2022-07-01

## 2022-07-01 PROCEDURE — 99213 OFFICE O/P EST LOW 20 MIN: CPT | Performed by: FAMILY MEDICINE

## 2022-07-01 NOTE — PROGRESS NOTES
Subjective     Lyla is a 89 year old patient who presents to clinic for evaluation with her niece.  Her niece noticed an unusual appearing lesion on right upper arm.  Thought it was likely a bruise but wanted it checked.  Lyla is feeling well otherwise without concerns.          Review of Systems   Constitutional, HEENT, cardiovascular, pulmonary, GI, , musculoskeletal, neuro, skin, endocrine and psych systems are negative, except as otherwise noted.      Objective    /66   Pulse 73   Wt 59.4 kg (131 lb)   SpO2 97%   BMI 23.96 kg/m      General: Well appearing, NAD  Skin: raised rough stuck-on appearing plaque right upper medial arm surrounded by purplish discoloration  Psych: normal mood and affect              Inflamed seborrheic keratosis  Likely SK got irritated and bled under skin.  Advise cover with bandage and monitor.  If not resolving or worsens should re-evaluate.

## 2022-08-11 ENCOUNTER — LAB REQUISITION (OUTPATIENT)
Dept: LAB | Facility: CLINIC | Age: 87
End: 2022-08-11
Payer: MEDICARE

## 2022-08-11 DIAGNOSIS — I10 ESSENTIAL (PRIMARY) HYPERTENSION: ICD-10-CM

## 2022-08-12 LAB
ANION GAP SERPL CALCULATED.3IONS-SCNC: 13 MMOL/L (ref 7–15)
BASOPHILS # BLD AUTO: 0.1 10E3/UL (ref 0–0.2)
BASOPHILS NFR BLD AUTO: 1 %
BUN SERPL-MCNC: 31.1 MG/DL (ref 8–23)
CALCIUM SERPL-MCNC: 8.2 MG/DL (ref 8.2–9.6)
CHLORIDE SERPL-SCNC: 104 MMOL/L (ref 98–107)
CREAT SERPL-MCNC: 1.51 MG/DL (ref 0.51–0.95)
DEPRECATED HCO3 PLAS-SCNC: 22 MMOL/L (ref 22–29)
EOSINOPHIL # BLD AUTO: 0.3 10E3/UL (ref 0–0.7)
EOSINOPHIL NFR BLD AUTO: 3 %
ERYTHROCYTE [DISTWIDTH] IN BLOOD BY AUTOMATED COUNT: 14.7 % (ref 10–15)
GFR SERPL CREATININE-BSD FRML MDRD: 31 ML/MIN/1.73M2
GLUCOSE SERPL-MCNC: 94 MG/DL (ref 70–99)
HCT VFR BLD AUTO: 27.4 % (ref 35–47)
HGB BLD-MCNC: 8.6 G/DL (ref 11.7–15.7)
IMM GRANULOCYTES # BLD: 0.1 10E3/UL
IMM GRANULOCYTES NFR BLD: 1 %
LYMPHOCYTES # BLD AUTO: 1 10E3/UL (ref 0.8–5.3)
LYMPHOCYTES NFR BLD AUTO: 10 %
MCH RBC QN AUTO: 30.7 PG (ref 26.5–33)
MCHC RBC AUTO-ENTMCNC: 31.4 G/DL (ref 31.5–36.5)
MCV RBC AUTO: 98 FL (ref 78–100)
MONOCYTES # BLD AUTO: 1 10E3/UL (ref 0–1.3)
MONOCYTES NFR BLD AUTO: 9 %
NEUTROPHILS # BLD AUTO: 7.7 10E3/UL (ref 1.6–8.3)
NEUTROPHILS NFR BLD AUTO: 76 %
NRBC # BLD AUTO: 0 10E3/UL
NRBC BLD AUTO-RTO: 0 /100
PLATELET # BLD AUTO: 349 10E3/UL (ref 150–450)
POTASSIUM SERPL-SCNC: 4.2 MMOL/L (ref 3.4–5.3)
RBC # BLD AUTO: 2.8 10E6/UL (ref 3.8–5.2)
SODIUM SERPL-SCNC: 139 MMOL/L (ref 136–145)
WBC # BLD AUTO: 10.1 10E3/UL (ref 4–11)

## 2022-08-12 PROCEDURE — P9604 ONE-WAY ALLOW PRORATED TRIP: HCPCS | Mod: ORL | Performed by: FAMILY MEDICINE

## 2022-08-12 PROCEDURE — 36415 COLL VENOUS BLD VENIPUNCTURE: CPT | Mod: ORL | Performed by: FAMILY MEDICINE

## 2022-08-12 PROCEDURE — 80048 BASIC METABOLIC PNL TOTAL CA: CPT | Mod: ORL | Performed by: FAMILY MEDICINE

## 2022-08-12 PROCEDURE — 85025 COMPLETE CBC W/AUTO DIFF WBC: CPT | Mod: ORL | Performed by: FAMILY MEDICINE

## 2022-08-14 ENCOUNTER — LAB REQUISITION (OUTPATIENT)
Dept: LAB | Facility: CLINIC | Age: 87
End: 2022-08-14
Payer: MEDICARE

## 2022-08-14 DIAGNOSIS — I10 ESSENTIAL (PRIMARY) HYPERTENSION: ICD-10-CM

## 2022-08-14 DIAGNOSIS — D64.9 ANEMIA, UNSPECIFIED: ICD-10-CM

## 2022-08-15 LAB
ANION GAP SERPL CALCULATED.3IONS-SCNC: 13 MMOL/L (ref 7–15)
BASOPHILS # BLD AUTO: 0.1 10E3/UL (ref 0–0.2)
BASOPHILS NFR BLD AUTO: 1 %
BUN SERPL-MCNC: 37.3 MG/DL (ref 8–23)
CALCIUM SERPL-MCNC: 8 MG/DL (ref 8.2–9.6)
CHLORIDE SERPL-SCNC: 102 MMOL/L (ref 98–107)
CREAT SERPL-MCNC: 1.9 MG/DL (ref 0.51–0.95)
DEPRECATED HCO3 PLAS-SCNC: 22 MMOL/L (ref 22–29)
EOSINOPHIL # BLD AUTO: 0.5 10E3/UL (ref 0–0.7)
EOSINOPHIL NFR BLD AUTO: 5 %
ERYTHROCYTE [DISTWIDTH] IN BLOOD BY AUTOMATED COUNT: 15 % (ref 10–15)
GFR SERPL CREATININE-BSD FRML MDRD: 24 ML/MIN/1.73M2
GLUCOSE SERPL-MCNC: 80 MG/DL (ref 70–99)
HCT VFR BLD AUTO: 27.7 % (ref 35–47)
HGB BLD-MCNC: 8.5 G/DL (ref 11.7–15.7)
IMM GRANULOCYTES # BLD: 0.1 10E3/UL
IMM GRANULOCYTES NFR BLD: 1 %
LYMPHOCYTES # BLD AUTO: 0.8 10E3/UL (ref 0.8–5.3)
LYMPHOCYTES NFR BLD AUTO: 9 %
MCH RBC QN AUTO: 30.6 PG (ref 26.5–33)
MCHC RBC AUTO-ENTMCNC: 30.7 G/DL (ref 31.5–36.5)
MCV RBC AUTO: 100 FL (ref 78–100)
MONOCYTES # BLD AUTO: 0.9 10E3/UL (ref 0–1.3)
MONOCYTES NFR BLD AUTO: 10 %
NEUTROPHILS # BLD AUTO: 7.4 10E3/UL (ref 1.6–8.3)
NEUTROPHILS NFR BLD AUTO: 74 %
NRBC # BLD AUTO: 0 10E3/UL
NRBC BLD AUTO-RTO: 0 /100
PLATELET # BLD AUTO: 331 10E3/UL (ref 150–450)
POTASSIUM SERPL-SCNC: 4 MMOL/L (ref 3.4–5.3)
RBC # BLD AUTO: 2.78 10E6/UL (ref 3.8–5.2)
SODIUM SERPL-SCNC: 137 MMOL/L (ref 136–145)
WBC # BLD AUTO: 9.8 10E3/UL (ref 4–11)

## 2022-08-15 PROCEDURE — P9604 ONE-WAY ALLOW PRORATED TRIP: HCPCS | Mod: ORL | Performed by: FAMILY MEDICINE

## 2022-08-15 PROCEDURE — 85025 COMPLETE CBC W/AUTO DIFF WBC: CPT | Mod: ORL | Performed by: FAMILY MEDICINE

## 2022-08-15 PROCEDURE — 80048 BASIC METABOLIC PNL TOTAL CA: CPT | Mod: ORL | Performed by: FAMILY MEDICINE

## 2022-08-15 PROCEDURE — 36415 COLL VENOUS BLD VENIPUNCTURE: CPT | Mod: ORL | Performed by: FAMILY MEDICINE

## 2022-08-21 ENCOUNTER — LAB REQUISITION (OUTPATIENT)
Dept: LAB | Facility: CLINIC | Age: 87
End: 2022-08-21
Payer: MEDICARE

## 2022-08-21 DIAGNOSIS — E03.9 HYPOTHYROIDISM, UNSPECIFIED: ICD-10-CM

## 2022-08-22 ENCOUNTER — LAB REQUISITION (OUTPATIENT)
Dept: LAB | Facility: CLINIC | Age: 87
End: 2022-08-22
Payer: MEDICARE

## 2022-08-22 DIAGNOSIS — E03.9 HYPOTHYROIDISM, UNSPECIFIED: ICD-10-CM

## 2022-08-22 DIAGNOSIS — E87.5 HYPERKALEMIA: ICD-10-CM

## 2022-08-22 LAB
ANION GAP SERPL CALCULATED.3IONS-SCNC: 10 MMOL/L (ref 7–15)
BUN SERPL-MCNC: 21.8 MG/DL (ref 8–23)
CALCIUM SERPL-MCNC: 8.8 MG/DL (ref 8.2–9.6)
CHLORIDE SERPL-SCNC: 106 MMOL/L (ref 98–107)
CREAT SERPL-MCNC: 1.07 MG/DL (ref 0.51–0.95)
DEPRECATED HCO3 PLAS-SCNC: 24 MMOL/L (ref 22–29)
GFR SERPL CREATININE-BSD FRML MDRD: 47 ML/MIN/1.73M2
GLUCOSE SERPL-MCNC: 79 MG/DL (ref 70–99)
HGB BLD-MCNC: 8.7 G/DL (ref 11.7–15.7)
POTASSIUM SERPL-SCNC: 5.6 MMOL/L (ref 3.4–5.3)
SODIUM SERPL-SCNC: 140 MMOL/L (ref 136–145)
TSH SERPL DL<=0.005 MIU/L-ACNC: 0.08 UIU/ML (ref 0.3–4.2)

## 2022-08-22 PROCEDURE — 85018 HEMOGLOBIN: CPT | Mod: ORL | Performed by: FAMILY MEDICINE

## 2022-08-22 PROCEDURE — 80048 BASIC METABOLIC PNL TOTAL CA: CPT | Mod: ORL | Performed by: FAMILY MEDICINE

## 2022-08-22 PROCEDURE — P9604 ONE-WAY ALLOW PRORATED TRIP: HCPCS | Mod: ORL | Performed by: FAMILY MEDICINE

## 2022-08-22 PROCEDURE — 36415 COLL VENOUS BLD VENIPUNCTURE: CPT | Mod: ORL | Performed by: FAMILY MEDICINE

## 2022-08-22 PROCEDURE — 84443 ASSAY THYROID STIM HORMONE: CPT | Mod: ORL | Performed by: FAMILY MEDICINE

## 2022-08-23 LAB
POTASSIUM SERPL-SCNC: 5 MMOL/L (ref 3.4–5.3)
T3 SERPL-MCNC: 104 NG/DL (ref 85–202)
T4 SERPL-MCNC: 8.5 UG/DL (ref 4.5–11.7)

## 2022-08-23 PROCEDURE — 84436 ASSAY OF TOTAL THYROXINE: CPT | Mod: ORL | Performed by: FAMILY MEDICINE

## 2022-08-23 PROCEDURE — P9604 ONE-WAY ALLOW PRORATED TRIP: HCPCS | Mod: ORL | Performed by: FAMILY MEDICINE

## 2022-08-23 PROCEDURE — 36415 COLL VENOUS BLD VENIPUNCTURE: CPT | Mod: ORL | Performed by: FAMILY MEDICINE

## 2022-08-23 PROCEDURE — 84132 ASSAY OF SERUM POTASSIUM: CPT | Mod: ORL | Performed by: FAMILY MEDICINE

## 2022-08-23 PROCEDURE — 84480 ASSAY TRIIODOTHYRONINE (T3): CPT | Mod: ORL | Performed by: FAMILY MEDICINE

## 2022-09-03 ENCOUNTER — HEALTH MAINTENANCE LETTER (OUTPATIENT)
Age: 87
End: 2022-09-03

## 2022-09-06 ENCOUNTER — LAB REQUISITION (OUTPATIENT)
Dept: LAB | Facility: CLINIC | Age: 87
End: 2022-09-06
Payer: MEDICARE

## 2022-09-07 ENCOUNTER — LAB REQUISITION (OUTPATIENT)
Dept: LAB | Facility: CLINIC | Age: 87
End: 2022-09-07
Payer: MEDICARE

## 2022-09-07 LAB
T3FREE SERPL-MCNC: 2.6 PG/ML (ref 2–4.4)
T4 FREE SERPL-MCNC: 1.54 NG/DL (ref 0.9–1.7)
TSH SERPL DL<=0.005 MIU/L-ACNC: 0.08 UIU/ML (ref 0.3–4.2)

## 2022-09-07 PROCEDURE — 84481 FREE ASSAY (FT-3): CPT | Mod: ORL | Performed by: FAMILY MEDICINE

## 2022-09-07 PROCEDURE — 36415 COLL VENOUS BLD VENIPUNCTURE: CPT | Mod: ORL | Performed by: FAMILY MEDICINE

## 2022-09-07 PROCEDURE — P9604 ONE-WAY ALLOW PRORATED TRIP: HCPCS | Mod: ORL | Performed by: FAMILY MEDICINE

## 2022-09-07 PROCEDURE — 84443 ASSAY THYROID STIM HORMONE: CPT | Mod: ORL | Performed by: FAMILY MEDICINE

## 2022-09-07 PROCEDURE — 84439 ASSAY OF FREE THYROXINE: CPT | Mod: ORL | Performed by: FAMILY MEDICINE

## 2022-09-08 PROCEDURE — 83520 IMMUNOASSAY QUANT NOS NONAB: CPT | Mod: ORL | Performed by: FAMILY MEDICINE

## 2022-09-08 PROCEDURE — 36415 COLL VENOUS BLD VENIPUNCTURE: CPT | Mod: ORL | Performed by: FAMILY MEDICINE

## 2022-09-08 PROCEDURE — P9604 ONE-WAY ALLOW PRORATED TRIP: HCPCS | Mod: ORL | Performed by: FAMILY MEDICINE

## 2022-09-09 LAB — TSH RECEP AB SER-ACNC: <1.1 IU/L (ref 0–1.75)

## 2022-09-13 ENCOUNTER — LAB REQUISITION (OUTPATIENT)
Dept: LAB | Facility: CLINIC | Age: 87
End: 2022-09-13
Payer: MEDICARE

## 2022-09-13 DIAGNOSIS — D64.9 ANEMIA, UNSPECIFIED: ICD-10-CM

## 2022-09-13 DIAGNOSIS — E87.6 HYPOKALEMIA: ICD-10-CM

## 2022-09-14 LAB
HGB BLD-MCNC: 8.5 G/DL (ref 11.7–15.7)
POTASSIUM SERPL-SCNC: 5.1 MMOL/L (ref 3.4–5.3)

## 2022-09-14 PROCEDURE — 36415 COLL VENOUS BLD VENIPUNCTURE: CPT | Mod: ORL | Performed by: FAMILY MEDICINE

## 2022-09-14 PROCEDURE — 85018 HEMOGLOBIN: CPT | Mod: ORL | Performed by: FAMILY MEDICINE

## 2022-09-14 PROCEDURE — 84132 ASSAY OF SERUM POTASSIUM: CPT | Mod: ORL | Performed by: FAMILY MEDICINE

## 2022-09-14 PROCEDURE — P9604 ONE-WAY ALLOW PRORATED TRIP: HCPCS | Mod: ORL | Performed by: FAMILY MEDICINE

## 2022-09-15 ENCOUNTER — LAB REQUISITION (OUTPATIENT)
Dept: LAB | Facility: CLINIC | Age: 87
End: 2022-09-15
Payer: MEDICARE

## 2022-09-15 DIAGNOSIS — E87.5 HYPERKALEMIA: ICD-10-CM

## 2022-09-16 LAB
ANION GAP SERPL CALCULATED.3IONS-SCNC: 10 MMOL/L (ref 7–15)
BUN SERPL-MCNC: 21.5 MG/DL (ref 8–23)
CALCIUM SERPL-MCNC: 8.7 MG/DL (ref 8.2–9.6)
CHLORIDE SERPL-SCNC: 106 MMOL/L (ref 98–107)
CREAT SERPL-MCNC: 1.17 MG/DL (ref 0.51–0.95)
DEPRECATED HCO3 PLAS-SCNC: 25 MMOL/L (ref 22–29)
GFR SERPL CREATININE-BSD FRML MDRD: 42 ML/MIN/1.73M2
GLUCOSE SERPL-MCNC: 84 MG/DL (ref 70–99)
POTASSIUM SERPL-SCNC: 4.5 MMOL/L (ref 3.4–5.3)
SODIUM SERPL-SCNC: 141 MMOL/L (ref 136–145)

## 2022-09-16 PROCEDURE — P9604 ONE-WAY ALLOW PRORATED TRIP: HCPCS | Mod: ORL | Performed by: FAMILY MEDICINE

## 2022-09-16 PROCEDURE — 80048 BASIC METABOLIC PNL TOTAL CA: CPT | Mod: ORL | Performed by: FAMILY MEDICINE

## 2022-09-16 PROCEDURE — 36415 COLL VENOUS BLD VENIPUNCTURE: CPT | Mod: ORL | Performed by: FAMILY MEDICINE

## 2022-09-19 ENCOUNTER — TELEPHONE (OUTPATIENT)
Dept: FAMILY MEDICINE | Facility: CLINIC | Age: 87
End: 2022-09-19

## 2022-09-19 NOTE — TELEPHONE ENCOUNTER
Daughter, Jayna calls reporting patient has been in [facility] since 8/9/22.   Fell and fx'd left hip 7/29/22 and hospitalized [facility] then [facility].   Jayna states is unhappy with care at rehab facility and been trying to get patient transferred but to no avail. Now is planning to have patient discharged to her home/apt with 24 hour care and home PT.   She has arranged 24 hour care services through [facility].   Wants home PT ordered through [facility] (recommended/suggested by [facility]).   Also wants hospital bed ordered to be delivered asap. Is hoping to have patient home this week with these services in place.   Last visit - 7/1/22 for skin issue; 4/8/22 for post hospital visit.   Plan - discussed needs visit (virtual visit) to discuss these needs and requested orders. Video visit scheduled for tomorrow at 415 with Dr. Butt.    9/20/22 Called [facility] - [facility]  [phone] with above info. [facility] are unaware of above plans but can help facilitate them. Per [facility], provider managing patient's care at facility will place order for home care/PT and hospital bed. Vikki will call Jayna to discuss their requests and help facilitate.     9/20/22 3pm spoke with [facility] again. She spoke with Jayna and worked things out. Facility providers will place orders with discharge.   Carolina Menchaca RN

## 2022-09-28 ENCOUNTER — VIRTUAL VISIT (OUTPATIENT)
Dept: FAMILY MEDICINE | Facility: CLINIC | Age: 87
End: 2022-09-28

## 2022-09-28 ENCOUNTER — TELEPHONE (OUTPATIENT)
Dept: FAMILY MEDICINE | Facility: CLINIC | Age: 87
End: 2022-09-28

## 2022-09-28 DIAGNOSIS — R39.9 LOWER URINARY TRACT SYMPTOMS (LUTS): Primary | ICD-10-CM

## 2022-09-28 PROCEDURE — 99213 OFFICE O/P EST LOW 20 MIN: CPT | Mod: 95 | Performed by: FAMILY MEDICINE

## 2022-09-28 RX ORDER — CEPHALEXIN 500 MG/1
500 CAPSULE ORAL 2 TIMES DAILY
Qty: 14 CAPSULE | Refills: 0 | Status: SHIPPED | OUTPATIENT
Start: 2022-09-28 | End: 2022-10-05

## 2022-09-28 NOTE — PROGRESS NOTES
"  Problem(s) Oriented visit      Video-Visit Details    Type of service:  Video Visit    Video Start Time (time video started): 1630    Video End Time (time video stopped): 1638    Originating Location (pt. Location): Home    Distant Location (provider location):  [facility]     Mode of Communication:  Telephone    Physician has received verbal consent for a Video Visit from the patient? Yes      Preston Harvey MD        Lyla Duran is being evaluated via a billable/telephone video visit.      The patient has been notified of following:     \"This video visit will be conducted via a call between you and your physician/provider. We have found that certain health care needs can be provided without the need for an in-person physical exam.  This service lets us provide the care you need with a video conversation.  If a prescription is necessary we can send it directly to your pharmacy.  If lab work is needed we can place an order for that and you can then stop by our lab to have the test done at a later time.    If during the course of the call the physician/provider feels a video visit is not appropriate, you will not be charged for this service.\"     Physician has received verbal consent for a Video Visit from the patient? Yes    SUBJECTIVE:                                                      Lyla Duran is a 89 year old female who is being seen through video consult for evaluation.  She was just discharged from TCU.  Is home with 24 hour care.  She is with her niece during the call.  The caregiver has noted significant urinary frequency and cloudy urine.  No fever, chills, flank pain, dysuria or hematuria.  They do not have a hat to catch the urine.  No other new concerns.  Acting her baseline.          ROS:    A 10 system review was completed and is as noted in HPI and otherwise negative.          OBJECTIVE:                                                      VS not able to be assessed      Gen: " Well sounding, NAD              ASSESSMENT/PLAN:                                                        Lyla was seen today for urinary problem.    Diagnoses and all orders for this visit:    Lower urinary tract symptoms (LUTS)    discussed challenges of empiric treatment without urinalysis.  However, it sounds as though getting a sample would be very challenging at present.  They prefer empiric treatment and will follow up if not improved.  Given age an infirmity this is reasonable.  Close follow up encouraged.

## 2022-09-28 NOTE — TELEPHONE ENCOUNTER
Gwen from [facility] left message that referral has been placed and accepted by their agency for home nursing and PT services.   Dr. Butt will follow by fax and phone for orders.   Patient has hospital and TCU follow up appt scheduled with Dr. Butt for 10/6/22.  Carolina Menchaca RN

## 2022-09-30 PROCEDURE — G0180 MD CERTIFICATION HHA PATIENT: HCPCS | Performed by: FAMILY MEDICINE

## 2022-10-01 ENCOUNTER — MEDICAL CORRESPONDENCE (OUTPATIENT)
Dept: FAMILY MEDICINE | Facility: CLINIC | Age: 87
End: 2022-10-01

## 2022-10-06 ENCOUNTER — VIRTUAL VISIT (OUTPATIENT)
Dept: FAMILY MEDICINE | Facility: CLINIC | Age: 87
End: 2022-10-06

## 2022-10-06 DIAGNOSIS — G30.9 ALZHEIMER'S DISEASE (H): Primary | ICD-10-CM

## 2022-10-06 DIAGNOSIS — S72.001S CLOSED FRACTURE OF RIGHT HIP, SEQUELA: ICD-10-CM

## 2022-10-06 DIAGNOSIS — N18.31 STAGE 3A CHRONIC KIDNEY DISEASE (H): ICD-10-CM

## 2022-10-06 DIAGNOSIS — R26.2 UNABLE TO WALK: ICD-10-CM

## 2022-10-06 DIAGNOSIS — E78.2 MIXED HYPERLIPIDEMIA: ICD-10-CM

## 2022-10-06 DIAGNOSIS — E03.8 OTHER SPECIFIED HYPOTHYROIDISM: ICD-10-CM

## 2022-10-06 DIAGNOSIS — F02.80 ALZHEIMER'S DISEASE (H): Primary | ICD-10-CM

## 2022-10-06 NOTE — PROGRESS NOTES
"    Video Problem(s) Oriented visit      Video Start Time: 2:15 PM    The patient has been notified of following:     \"This video visit will be conducted via a call between you and your physician/provider. We have found that certain health care needs can be provided without the need for an in-person physical exam.  This service lets us provide the care you need with a video conversation.  If a prescription is necessary we can send it directly to your pharmacy.  If lab work is needed we can place an order for that and you can then stop by our lab to have the test done at a later time.    Video visits are billed at different rates depending on your insurance coverage.  Please reach out to your insurance provider with any questions.    If during the course of the call the physician/provider feels a video visit is not appropriate, you will not be charged for this service.\"    Patient has given verbal consent for Video visit? Yes    How would you like to obtain your AVS? MyChart    Will anyone else be joining your video visit? No  SUBJECTIVE:                                                    Lyla Duran is a 89 year old female who presents to clinic today for the following health issues :  Just out of NH for past few weeks,  Unable to get up    Dementia makes it really hard to get her to do any rehab so has really decreased her capabilities, now has some home care but really is not making progress, is in the [facility] in [place].          Problem list, Medication list, Allergies, and Medical/Social/Surgical histories reviewed in EPIC and updated as appropriate.   Additional history: as documented    ROS:  General and Resp. completed and negative except as noted above    Histories:   Patient Active Problem List   Diagnosis     Essential hypertension     Mixed hyperlipidemia     Presbyacusis     Disequilibrium     Hypothyroid     Poor short-term memory     FH: CAD (coronary artery disease)     Health Care Home     Dementia " without behavioral disturbance, unspecified dementia type     Chronic kidney disease, stage 3     Inflamed seborrheic keratosis     Past Surgical History:   Procedure Laterality Date     APPENDECTOMY       CATARACT IOL, RT/LT      Cataract IOL RT/LT     EYE SURGERY      retinal hemorrage      THYROIDECTOMY       Thyroidectomy     TONSILLECTOMY & ADENOIDECTOMY       VITRECTOMY PARSPLANA, SECONDARY INTRAOCULAR LENS IMPLANT, COMBINED  9/18/2013    Procedure: COMBINED VITRECTOMY PARSPLANA, SECONDARY INTRAOCULAR LENS IMPLANT;  LEFT 20 GAUGE VITRECTOMY PARSPLANA, EXCHANGE INTRAOCULAR LENS IMPLANT;  Surgeon: Brandon Umanzor MD;  Location: [facility]       Social History     Tobacco Use     Smoking status: Never Smoker     Smokeless tobacco: Never Used   Substance Use Topics     Alcohol use: Yes     Alcohol/week: 0.8 standard drinks     Types: 1 Standard drinks or equivalent per week     Family History   Problem Relation Age of Onset     Cancer Mother      Heart Disease Father      C.A.D. Brother      Cancer Brother      C.A.D. Brother            OBJECTIVE:                                                    There were no vitals taken for this visit.  There is no height or weight on file to calculate BMI.   APPEARANCE: = mild distress and fatigued  Musculsktl: unable to stand and in a wheel chair all the time.     ASSESSMENT/PLAN:                                                        Lyla was seen today for follow up.    Diagnoses and all orders for this visit:    Dementia without behavioral disturbance, unspecified dementia type    Unable to walk    Closed fracture of left hip, sequela    Mixed hyperlipidemia    Other specified hypothyroidism    Agree that Hospice would be appropriate.    Unable to connect to video  There are no Patient Instructions on file for this visit.    The following health maintenance items are reviewed in Epic and correct as of today:  Health Maintenance   Topic Date Due     ZOSTER IMMUNIZATION (1  of 2) Never done     LIPID  05/31/2014     COVID-19 Vaccine (5 - Booster for Moderna series) 06/16/2022     INFLUENZA VACCINE (1) 09/01/2022     MEDICARE ANNUAL WELLNESS VISIT  10/25/2022     FALL RISK ASSESSMENT  10/25/2022     TSH W/FREE T4 REFLEX  09/07/2023     HEMOGLOBIN  09/14/2023     BMP  09/16/2023     ADVANCE CARE PLANNING  10/25/2026     DTAP/TDAP/TD IMMUNIZATION (3 - Td or Tdap) 03/29/2032     PHQ-2 (once per calendar year)  Completed     Pneumococcal Vaccine: 65+ Years  Completed     URINALYSIS  Completed     IPV IMMUNIZATION  Aged Out     MENINGITIS IMMUNIZATION  Aged Out     HEPATITIS B IMMUNIZATION  Aged Out     MICROALBUMIN  Discontinued       Video-Visit Details    This visit is being conducted as a virtual visit due to the emphasis on mitigation of the COVID-19 virus pandemic.   Type of service:  Video Visit    Originating Location (pt. Location): Home    Distant Location (provider location):  [facility]     Platform used for Video Visit: Unable to connect with video, audio only available    Rajan Butt MD  [facility]  Family Practice  [facility]  [phone]    Video End Time:2:49 PM  For any issues my office # is [phone]

## 2022-10-11 ENCOUNTER — MEDICAL CORRESPONDENCE (OUTPATIENT)
Dept: FAMILY MEDICINE | Facility: CLINIC | Age: 87
End: 2022-10-11

## 2022-10-14 ENCOUNTER — MEDICAL CORRESPONDENCE (OUTPATIENT)
Dept: FAMILY MEDICINE | Facility: CLINIC | Age: 87
End: 2022-10-14

## 2022-10-18 ENCOUNTER — MEDICAL CORRESPONDENCE (OUTPATIENT)
Dept: FAMILY MEDICINE | Facility: CLINIC | Age: 87
End: 2022-10-18

## 2022-10-20 ENCOUNTER — MEDICAL CORRESPONDENCE (OUTPATIENT)
Dept: FAMILY MEDICINE | Facility: CLINIC | Age: 87
End: 2022-10-20

## 2022-10-21 ENCOUNTER — MEDICAL CORRESPONDENCE (OUTPATIENT)
Dept: FAMILY MEDICINE | Facility: CLINIC | Age: 87
End: 2022-10-21

## 2022-10-25 ENCOUNTER — MEDICAL CORRESPONDENCE (OUTPATIENT)
Dept: FAMILY MEDICINE | Facility: CLINIC | Age: 87
End: 2022-10-25

## 2022-10-31 ENCOUNTER — MEDICAL CORRESPONDENCE (OUTPATIENT)
Dept: FAMILY MEDICINE | Facility: CLINIC | Age: 87
End: 2022-10-31

## 2022-11-04 ENCOUNTER — MEDICAL CORRESPONDENCE (OUTPATIENT)
Dept: FAMILY MEDICINE | Facility: CLINIC | Age: 87
End: 2022-11-04

## 2022-12-21 ENCOUNTER — MEDICAL CORRESPONDENCE (OUTPATIENT)
Dept: FAMILY MEDICINE | Facility: CLINIC | Age: 87
End: 2022-12-21

## 2023-01-14 ENCOUNTER — HEALTH MAINTENANCE LETTER (OUTPATIENT)
Age: 88
End: 2023-01-14

## 2023-02-03 ENCOUNTER — LAB REQUISITION (OUTPATIENT)
Dept: LAB | Facility: CLINIC | Age: 88
End: 2023-02-03
Payer: MEDICARE

## 2023-02-03 DIAGNOSIS — I10 ESSENTIAL (PRIMARY) HYPERTENSION: ICD-10-CM

## 2023-02-03 DIAGNOSIS — R53.1 WEAKNESS: ICD-10-CM

## 2023-02-03 DIAGNOSIS — E55.9 VITAMIN D DEFICIENCY, UNSPECIFIED: ICD-10-CM

## 2023-02-06 LAB
ANION GAP SERPL CALCULATED.3IONS-SCNC: 10 MMOL/L (ref 7–15)
BASOPHILS # BLD AUTO: 0.1 10E3/UL (ref 0–0.2)
BASOPHILS NFR BLD AUTO: 1 %
BUN SERPL-MCNC: 22.1 MG/DL (ref 8–23)
CALCIUM SERPL-MCNC: 8.6 MG/DL (ref 8.2–9.6)
CHLORIDE SERPL-SCNC: 108 MMOL/L (ref 98–107)
CREAT SERPL-MCNC: 1.13 MG/DL (ref 0.51–0.95)
DEPRECATED CALCIDIOL+CALCIFEROL SERPL-MC: 11 UG/L (ref 20–75)
DEPRECATED HCO3 PLAS-SCNC: 26 MMOL/L (ref 22–29)
EOSINOPHIL # BLD AUTO: 0.4 10E3/UL (ref 0–0.7)
EOSINOPHIL NFR BLD AUTO: 7 %
ERYTHROCYTE [DISTWIDTH] IN BLOOD BY AUTOMATED COUNT: 14 % (ref 10–15)
GFR SERPL CREATININE-BSD FRML MDRD: 44 ML/MIN/1.73M2
GLUCOSE SERPL-MCNC: 73 MG/DL (ref 70–99)
HCT VFR BLD AUTO: 31.6 % (ref 35–47)
HGB BLD-MCNC: 9.8 G/DL (ref 11.7–15.7)
IMM GRANULOCYTES # BLD: 0 10E3/UL
IMM GRANULOCYTES NFR BLD: 0 %
LYMPHOCYTES # BLD AUTO: 1.4 10E3/UL (ref 0.8–5.3)
LYMPHOCYTES NFR BLD AUTO: 27 %
MCH RBC QN AUTO: 29.3 PG (ref 26.5–33)
MCHC RBC AUTO-ENTMCNC: 31 G/DL (ref 31.5–36.5)
MCV RBC AUTO: 94 FL (ref 78–100)
MONOCYTES # BLD AUTO: 0.7 10E3/UL (ref 0–1.3)
MONOCYTES NFR BLD AUTO: 13 %
NEUTROPHILS # BLD AUTO: 2.7 10E3/UL (ref 1.6–8.3)
NEUTROPHILS NFR BLD AUTO: 52 %
NRBC # BLD AUTO: 0 10E3/UL
NRBC BLD AUTO-RTO: 0 /100
PLATELET # BLD AUTO: 247 10E3/UL (ref 150–450)
POTASSIUM SERPL-SCNC: 4.4 MMOL/L (ref 3.4–5.3)
RBC # BLD AUTO: 3.35 10E6/UL (ref 3.8–5.2)
SODIUM SERPL-SCNC: 144 MMOL/L (ref 136–145)
TSH SERPL DL<=0.005 MIU/L-ACNC: 0.16 UIU/ML (ref 0.3–4.2)
VIT B12 SERPL-MCNC: 208 PG/ML (ref 232–1245)
WBC # BLD AUTO: 5.2 10E3/UL (ref 4–11)

## 2023-02-06 PROCEDURE — P9604 ONE-WAY ALLOW PRORATED TRIP: HCPCS | Mod: ORL | Performed by: NURSE PRACTITIONER

## 2023-02-06 PROCEDURE — 80048 BASIC METABOLIC PNL TOTAL CA: CPT | Mod: ORL | Performed by: NURSE PRACTITIONER

## 2023-02-06 PROCEDURE — 84443 ASSAY THYROID STIM HORMONE: CPT | Mod: ORL | Performed by: NURSE PRACTITIONER

## 2023-02-06 PROCEDURE — 85025 COMPLETE CBC W/AUTO DIFF WBC: CPT | Mod: ORL | Performed by: NURSE PRACTITIONER

## 2023-02-06 PROCEDURE — 82607 VITAMIN B-12: CPT | Mod: ORL | Performed by: NURSE PRACTITIONER

## 2023-02-06 PROCEDURE — 82306 VITAMIN D 25 HYDROXY: CPT | Mod: ORL | Performed by: NURSE PRACTITIONER

## 2023-02-06 PROCEDURE — 36415 COLL VENOUS BLD VENIPUNCTURE: CPT | Mod: ORL | Performed by: NURSE PRACTITIONER

## 2023-08-25 ENCOUNTER — LAB REQUISITION (OUTPATIENT)
Dept: LAB | Facility: CLINIC | Age: 88
End: 2023-08-25
Payer: MEDICARE

## 2023-08-25 DIAGNOSIS — E55.9 VITAMIN D DEFICIENCY, UNSPECIFIED: ICD-10-CM

## 2023-08-25 DIAGNOSIS — I10 ESSENTIAL (PRIMARY) HYPERTENSION: ICD-10-CM

## 2023-08-25 DIAGNOSIS — R53.1 WEAKNESS: ICD-10-CM

## 2023-08-25 DIAGNOSIS — E03.9 HYPOTHYROIDISM, UNSPECIFIED: ICD-10-CM

## 2023-08-28 LAB
ANION GAP SERPL CALCULATED.3IONS-SCNC: 10 MMOL/L (ref 7–15)
BUN SERPL-MCNC: 30.9 MG/DL (ref 8–23)
CALCIUM SERPL-MCNC: 9.2 MG/DL (ref 8.2–9.6)
CHLORIDE SERPL-SCNC: 107 MMOL/L (ref 98–107)
CREAT SERPL-MCNC: 1.1 MG/DL (ref 0.51–0.95)
DEPRECATED CALCIDIOL+CALCIFEROL SERPL-MC: 12 UG/L (ref 20–75)
DEPRECATED HCO3 PLAS-SCNC: 26 MMOL/L (ref 22–29)
ERYTHROCYTE [DISTWIDTH] IN BLOOD BY AUTOMATED COUNT: 12.5 % (ref 10–15)
GFR SERPL CREATININE-BSD FRML MDRD: 45 ML/MIN/1.73M2
GLUCOSE SERPL-MCNC: 76 MG/DL (ref 70–99)
HCT VFR BLD AUTO: 33.2 % (ref 35–47)
HGB BLD-MCNC: 10.4 G/DL (ref 11.7–15.7)
MCH RBC QN AUTO: 32.3 PG (ref 26.5–33)
MCHC RBC AUTO-ENTMCNC: 31.3 G/DL (ref 31.5–36.5)
MCV RBC AUTO: 103 FL (ref 78–100)
PLATELET # BLD AUTO: 204 10E3/UL (ref 150–450)
POTASSIUM SERPL-SCNC: 4.5 MMOL/L (ref 3.4–5.3)
RBC # BLD AUTO: 3.22 10E6/UL (ref 3.8–5.2)
SODIUM SERPL-SCNC: 143 MMOL/L (ref 136–145)
TSH SERPL DL<=0.005 MIU/L-ACNC: 0.86 UIU/ML (ref 0.3–4.2)
VIT B12 SERPL-MCNC: 352 PG/ML (ref 232–1245)
WBC # BLD AUTO: 5.9 10E3/UL (ref 4–11)

## 2023-08-28 PROCEDURE — 85027 COMPLETE CBC AUTOMATED: CPT | Mod: ORL | Performed by: NURSE PRACTITIONER

## 2023-08-28 PROCEDURE — 80048 BASIC METABOLIC PNL TOTAL CA: CPT | Mod: ORL | Performed by: NURSE PRACTITIONER

## 2023-08-28 PROCEDURE — P9604 ONE-WAY ALLOW PRORATED TRIP: HCPCS | Mod: ORL | Performed by: NURSE PRACTITIONER

## 2023-08-28 PROCEDURE — 36415 COLL VENOUS BLD VENIPUNCTURE: CPT | Mod: ORL | Performed by: NURSE PRACTITIONER

## 2023-08-28 PROCEDURE — 82607 VITAMIN B-12: CPT | Mod: ORL | Performed by: NURSE PRACTITIONER

## 2023-08-28 PROCEDURE — 82306 VITAMIN D 25 HYDROXY: CPT | Mod: ORL | Performed by: NURSE PRACTITIONER

## 2023-08-28 PROCEDURE — 84443 ASSAY THYROID STIM HORMONE: CPT | Mod: ORL | Performed by: NURSE PRACTITIONER

## 2024-02-17 ENCOUNTER — HEALTH MAINTENANCE LETTER (OUTPATIENT)
Age: 89
End: 2024-02-17

## 2024-07-25 ENCOUNTER — LAB REQUISITION (OUTPATIENT)
Dept: LAB | Facility: CLINIC | Age: 89
End: 2024-07-25
Payer: MEDICARE

## 2024-07-25 DIAGNOSIS — I10 ESSENTIAL (PRIMARY) HYPERTENSION: ICD-10-CM

## 2024-07-29 LAB
ANION GAP SERPL CALCULATED.3IONS-SCNC: 12 MMOL/L (ref 7–15)
BUN SERPL-MCNC: 32.9 MG/DL (ref 8–23)
CALCIUM SERPL-MCNC: 8.7 MG/DL (ref 8.8–10.4)
CHLORIDE SERPL-SCNC: 108 MMOL/L (ref 98–107)
CREAT SERPL-MCNC: 1.04 MG/DL (ref 0.51–0.95)
EGFRCR SERPLBLD CKD-EPI 2021: 48 ML/MIN/1.73M2
ERYTHROCYTE [DISTWIDTH] IN BLOOD BY AUTOMATED COUNT: 12.9 % (ref 10–15)
GLUCOSE SERPL-MCNC: 81 MG/DL (ref 70–99)
HCO3 SERPL-SCNC: 23 MMOL/L (ref 22–29)
HCT VFR BLD AUTO: 34.2 % (ref 35–47)
HGB BLD-MCNC: 10.9 G/DL (ref 11.7–15.7)
MCH RBC QN AUTO: 32.6 PG (ref 26.5–33)
MCHC RBC AUTO-ENTMCNC: 31.9 G/DL (ref 31.5–36.5)
MCV RBC AUTO: 102 FL (ref 78–100)
PLATELET # BLD AUTO: 202 10E3/UL (ref 150–450)
POTASSIUM SERPL-SCNC: 5.4 MMOL/L (ref 3.4–5.3)
RBC # BLD AUTO: 3.34 10E6/UL (ref 3.8–5.2)
SODIUM SERPL-SCNC: 143 MMOL/L (ref 135–145)
WBC # BLD AUTO: 5.8 10E3/UL (ref 4–11)

## 2024-07-29 PROCEDURE — 80048 BASIC METABOLIC PNL TOTAL CA: CPT | Mod: ORL | Performed by: NURSE PRACTITIONER

## 2024-07-29 PROCEDURE — P9603 ONE-WAY ALLOW PRORATED MILES: HCPCS | Mod: ORL | Performed by: NURSE PRACTITIONER

## 2024-07-29 PROCEDURE — 36415 COLL VENOUS BLD VENIPUNCTURE: CPT | Mod: ORL | Performed by: NURSE PRACTITIONER

## 2024-07-29 PROCEDURE — 85027 COMPLETE CBC AUTOMATED: CPT | Mod: ORL | Performed by: NURSE PRACTITIONER

## 2024-10-28 NOTE — PLAN OF CARE
Problem: Patient Care Overview  Goal: Plan of Care/Patient Progress Review  Outcome: Improving  Pt A/O to self, forgetful. Reorientation/redirection provided, VPM in place. VSS on RA. Sepsis BPA fired -- LA 1.4. Up with 1 to bathroom, GB and walker. Pt denies pain. Regular diet, tolerating. Voiding adequately, incontinent at times, BM x2. IV SL, IV abx. Elevated WBC 16.3 today. Anticipated D/C per MD 1-2 days. Nursing continue to monitor.  Moody Khan updated on the phone this evening.   Stable